# Patient Record
Sex: MALE | Race: WHITE | Employment: OTHER | URBAN - METROPOLITAN AREA
[De-identification: names, ages, dates, MRNs, and addresses within clinical notes are randomized per-mention and may not be internally consistent; named-entity substitution may affect disease eponyms.]

---

## 2017-02-10 RX ORDER — GLYBURIDE 5 MG/1
TABLET ORAL
Qty: 360 TABLET | Refills: 0 | Status: SHIPPED | OUTPATIENT
Start: 2017-02-10 | End: 2017-06-21

## 2017-02-12 RX ORDER — OMEGA-3-ACID ETHYL ESTERS 1 G/1
CAPSULE, LIQUID FILLED ORAL
Qty: 360 CAPSULE | Refills: 0 | Status: SHIPPED | OUTPATIENT
Start: 2017-02-12 | End: 2017-07-21

## 2017-02-12 RX ORDER — TAMSULOSIN HYDROCHLORIDE 0.4 MG/1
CAPSULE ORAL
Qty: 90 CAPSULE | Refills: 0 | Status: SHIPPED | OUTPATIENT
Start: 2017-02-12 | End: 2017-05-30

## 2017-02-12 RX ORDER — SITAGLIPTIN AND METFORMIN HYDROCHLORIDE 50; 1000 MG/1; MG/1
TABLET, FILM COATED, EXTENDED RELEASE ORAL
Qty: 180 TABLET | Refills: 0 | Status: SHIPPED | OUTPATIENT
Start: 2017-02-12 | End: 2017-05-30

## 2017-02-12 RX ORDER — VALSARTAN AND HYDROCHLOROTHIAZIDE 160; 25 MG/1; MG/1
TABLET ORAL
Qty: 90 TABLET | Refills: 0 | Status: SHIPPED | OUTPATIENT
Start: 2017-02-12 | End: 2017-05-30

## 2017-03-06 RX ORDER — INSULIN DETEMIR 100 [IU]/ML
INJECTION, SOLUTION SUBCUTANEOUS
Qty: 18 PEN | Refills: 1 | Status: SHIPPED | OUTPATIENT
Start: 2017-03-06 | End: 2017-07-21

## 2017-04-13 ENCOUNTER — APPOINTMENT (OUTPATIENT)
Dept: LAB | Age: 62
End: 2017-04-13
Attending: FAMILY MEDICINE
Payer: COMMERCIAL

## 2017-04-13 DIAGNOSIS — E11.40 TYPE 2 DIABETES MELLITUS WITH DIABETIC NEUROPATHY, WITH LONG-TERM CURRENT USE OF INSULIN (HCC): ICD-10-CM

## 2017-04-13 DIAGNOSIS — E78.5 DYSLIPIDEMIA: ICD-10-CM

## 2017-04-13 DIAGNOSIS — E55.9 VITAMIN D DEFICIENCY: ICD-10-CM

## 2017-04-13 DIAGNOSIS — Z87.39 HISTORY OF GOUT: ICD-10-CM

## 2017-04-13 DIAGNOSIS — Z79.4 TYPE 2 DIABETES MELLITUS WITH DIABETIC NEUROPATHY, WITH LONG-TERM CURRENT USE OF INSULIN (HCC): ICD-10-CM

## 2017-04-13 PROCEDURE — 36415 COLL VENOUS BLD VENIPUNCTURE: CPT | Performed by: FAMILY MEDICINE

## 2017-04-13 PROCEDURE — 82570 ASSAY OF URINE CREATININE: CPT | Performed by: FAMILY MEDICINE

## 2017-04-13 PROCEDURE — 84550 ASSAY OF BLOOD/URIC ACID: CPT | Performed by: FAMILY MEDICINE

## 2017-04-13 PROCEDURE — 80061 LIPID PANEL: CPT | Performed by: FAMILY MEDICINE

## 2017-04-13 PROCEDURE — 83036 HEMOGLOBIN GLYCOSYLATED A1C: CPT | Performed by: FAMILY MEDICINE

## 2017-04-13 PROCEDURE — 80053 COMPREHEN METABOLIC PANEL: CPT | Performed by: FAMILY MEDICINE

## 2017-04-13 PROCEDURE — 82306 VITAMIN D 25 HYDROXY: CPT | Performed by: FAMILY MEDICINE

## 2017-04-13 PROCEDURE — 82043 UR ALBUMIN QUANTITATIVE: CPT | Performed by: FAMILY MEDICINE

## 2017-04-18 ENCOUNTER — OFFICE VISIT (OUTPATIENT)
Dept: FAMILY MEDICINE CLINIC | Facility: CLINIC | Age: 62
End: 2017-04-18

## 2017-04-18 VITALS
BODY MASS INDEX: 37.03 KG/M2 | TEMPERATURE: 97 F | DIASTOLIC BLOOD PRESSURE: 78 MMHG | SYSTOLIC BLOOD PRESSURE: 138 MMHG | HEART RATE: 80 BPM | RESPIRATION RATE: 16 BRPM | WEIGHT: 250 LBS | HEIGHT: 69 IN

## 2017-04-18 DIAGNOSIS — Z79.4 TYPE 2 DIABETES MELLITUS WITH DIABETIC NEUROPATHY, WITH LONG-TERM CURRENT USE OF INSULIN (HCC): Primary | ICD-10-CM

## 2017-04-18 DIAGNOSIS — E55.9 VITAMIN D DEFICIENCY: ICD-10-CM

## 2017-04-18 DIAGNOSIS — E11.40 TYPE 2 DIABETES MELLITUS WITH DIABETIC NEUROPATHY, WITH LONG-TERM CURRENT USE OF INSULIN (HCC): Primary | ICD-10-CM

## 2017-04-18 DIAGNOSIS — F17.200 SMOKING: ICD-10-CM

## 2017-04-18 DIAGNOSIS — Z87.39 HISTORY OF GOUT: ICD-10-CM

## 2017-04-18 DIAGNOSIS — E78.5 DYSLIPIDEMIA: ICD-10-CM

## 2017-04-18 DIAGNOSIS — J44.9 CHRONIC OBSTRUCTIVE PULMONARY DISEASE, UNSPECIFIED COPD TYPE (HCC): ICD-10-CM

## 2017-04-18 DIAGNOSIS — E66.09 NON MORBID OBESITY DUE TO EXCESS CALORIES: ICD-10-CM

## 2017-04-18 DIAGNOSIS — I10 ESSENTIAL HYPERTENSION: ICD-10-CM

## 2017-04-18 PROCEDURE — 99214 OFFICE O/P EST MOD 30 MIN: CPT | Performed by: FAMILY MEDICINE

## 2017-04-18 PROCEDURE — 99406 BEHAV CHNG SMOKING 3-10 MIN: CPT | Performed by: FAMILY MEDICINE

## 2017-04-18 NOTE — PROGRESS NOTES
HPI:   Hernan York is a 59year old male who presents for recheck of his diabetes, hypertension and dyslipidemia. Patient has not been checking his FBS's at home regularly.  He reports that the readings that he does recall have been around 120's-130'  U/L   AST 25 15-41 U/L   Alt 41 17-63 U/L   Bilirubin, Total 0.5 0.1-2.0 mg/dL   Total Protein 7.8 6.1-8.3 g/dL   Albumin 4.1 3.5-4.8 g/dL   Sodium 137 136-144 mmol/L   Potassium 4.3 3.6-5.1 mmol/L   Chloride 101 101-111 mmol/L   CO2 29.0 22.0-32.0 1 BY MOUTH NIGHTLY Disp: 90 tablet Rfl: 1   SPIRIVA HANDIHALER 18 MCG Inhalation Cap USE 2 INHALATIONS FROM ONE CAPSULE DAILY VIA HANDIHALER Disp: 90 capsule Rfl: 1   Pantoprazole Sodium 40 MG Oral Tab EC Take 1 tablet (40 mg total) by mouth every morning ESOPHAGOGASTRODUODENOSCOPY (EGD);   Surgeon: Bran Mensah MD;  Location: [facility] ENDOSCOPY      Social History:   Smoking Status: Current Every Day Smoker        Packs/Day: 1.00  Years: 30        Types: Cigarettes    Smokeless Status: Never Used who presents for a recheck of his diabetes, HTN, and dyslipidemia. Diabetic control is moderate  Recommendations are: continue present meds, check HgbA1C, check fasting lipids, CMP, and urine microalbumin in 3 months.    Advised to continue to focus on he

## 2017-05-30 RX ORDER — TAMSULOSIN HYDROCHLORIDE 0.4 MG/1
CAPSULE ORAL
Qty: 90 CAPSULE | Refills: 0 | Status: SHIPPED | OUTPATIENT
Start: 2017-05-30 | End: 2017-07-21

## 2017-05-30 RX ORDER — VALSARTAN AND HYDROCHLOROTHIAZIDE 160; 25 MG/1; MG/1
TABLET ORAL
Qty: 90 TABLET | Refills: 0 | Status: SHIPPED | OUTPATIENT
Start: 2017-05-30 | End: 2017-07-21

## 2017-05-30 RX ORDER — SITAGLIPTIN AND METFORMIN HYDROCHLORIDE 50; 1000 MG/1; MG/1
TABLET, FILM COATED, EXTENDED RELEASE ORAL
Qty: 180 TABLET | Refills: 0 | Status: SHIPPED | OUTPATIENT
Start: 2017-05-30 | End: 2017-07-21

## 2017-06-21 ENCOUNTER — TELEPHONE (OUTPATIENT)
Dept: FAMILY MEDICINE CLINIC | Facility: CLINIC | Age: 62
End: 2017-06-21

## 2017-06-21 RX ORDER — GLYBURIDE 5 MG/1
TABLET ORAL
Qty: 120 TABLET | Refills: 0 | Status: SHIPPED | OUTPATIENT
Start: 2017-06-21 | End: 2017-07-21

## 2017-06-21 NOTE — TELEPHONE ENCOUNTER
Pt wants only this time to call his prescription glyburide (30 days rest goes to mail order) to [facility] on [address].  Leaving out of town Friday need asap

## 2017-07-22 RX ORDER — OMEGA-3-ACID ETHYL ESTERS 1 G/1
CAPSULE, LIQUID FILLED ORAL
Qty: 360 CAPSULE | Refills: 0 | Status: SHIPPED
Start: 2017-07-22 | End: 2017-10-09

## 2017-07-22 NOTE — TELEPHONE ENCOUNTER
From: Sandra Stewart  Sent: 7/21/2017 9:35 AM CDT  Subject: Medication Renewal Request    Sandra Stewart would like a refill of the following medications:  OMEGA-3-ACID ETHYL ESTERS 1 g Oral Cap Javier Harmon MD]    Preferred pharmacy: Other

## 2017-07-24 RX ORDER — GLYBURIDE 5 MG/1
TABLET ORAL
Qty: 120 TABLET | Refills: 0 | Status: SHIPPED
Start: 2017-07-24 | End: 2017-07-26

## 2017-07-24 RX ORDER — VALSARTAN AND HYDROCHLOROTHIAZIDE 160; 25 MG/1; MG/1
TABLET ORAL
Qty: 90 TABLET | Refills: 0 | Status: SHIPPED
Start: 2017-07-24 | End: 2017-09-19

## 2017-07-24 RX ORDER — EZETIMIBE AND SIMVASTATIN 10; 40 MG/1; MG/1
TABLET ORAL
Qty: 90 TABLET | Refills: 0 | Status: SHIPPED
Start: 2017-07-24 | End: 2017-09-19

## 2017-07-24 RX ORDER — TAMSULOSIN HYDROCHLORIDE 0.4 MG/1
0.4 CAPSULE ORAL DAILY
Qty: 90 CAPSULE | Refills: 0 | Status: SHIPPED
Start: 2017-07-24 | End: 2017-09-17

## 2017-07-24 NOTE — TELEPHONE ENCOUNTER
From: Florida Abraham  Sent: 7/21/2017 9:35 AM CDT  Subject: Medication Renewal Request    Florida Abraham would like a refill of the following medications:  Insulin Pen Needle (BD PEN NEEDLE SHORT U/F) 31G X 8 MM Does not apply Misc [Jo Ann Wilson,

## 2017-07-26 RX ORDER — GLYBURIDE 5 MG/1
TABLET ORAL
Qty: 120 TABLET | Refills: 0 | Status: SHIPPED | OUTPATIENT
Start: 2017-07-26 | End: 2017-08-07

## 2017-08-07 ENCOUNTER — TELEPHONE (OUTPATIENT)
Dept: FAMILY MEDICINE CLINIC | Facility: CLINIC | Age: 62
End: 2017-08-07

## 2017-08-07 RX ORDER — GLYBURIDE 5 MG/1
TABLET ORAL
Qty: 360 TABLET | Refills: 0 | COMMUNITY
Start: 2017-08-07 | End: 2018-02-14

## 2017-08-07 NOTE — TELEPHONE ENCOUNTER
Received call from [facility], spoke with Rhonda.   [facility] [facility] to know if the could increase the amount to 90 days as the insurance will not fill for 30 days at a time through [employer]

## 2017-08-10 ENCOUNTER — APPOINTMENT (OUTPATIENT)
Dept: LAB | Age: 62
End: 2017-08-10
Attending: FAMILY MEDICINE
Payer: COMMERCIAL

## 2017-08-10 DIAGNOSIS — Z87.39 HISTORY OF GOUT: ICD-10-CM

## 2017-08-10 DIAGNOSIS — Z79.4 TYPE 2 DIABETES MELLITUS WITH DIABETIC NEUROPATHY, WITH LONG-TERM CURRENT USE OF INSULIN (HCC): ICD-10-CM

## 2017-08-10 DIAGNOSIS — E11.40 TYPE 2 DIABETES MELLITUS WITH DIABETIC NEUROPATHY, WITH LONG-TERM CURRENT USE OF INSULIN (HCC): ICD-10-CM

## 2017-08-10 LAB
ALBUMIN SERPL-MCNC: 3.9 G/DL (ref 3.5–4.8)
ALP LIVER SERPL-CCNC: 82 U/L (ref 45–117)
ALT SERPL-CCNC: 36 U/L (ref 17–63)
AST SERPL-CCNC: 20 U/L (ref 15–41)
BILIRUB SERPL-MCNC: 0.7 MG/DL (ref 0.1–2)
BUN BLD-MCNC: 27 MG/DL (ref 8–20)
CALCIUM BLD-MCNC: 9.4 MG/DL (ref 8.3–10.3)
CHLORIDE: 102 MMOL/L (ref 101–111)
CO2: 28 MMOL/L (ref 22–32)
CREAT BLD-MCNC: 1.03 MG/DL (ref 0.7–1.3)
CREAT UR-SCNC: 82.9 MG/DL
EST. AVERAGE GLUCOSE BLD GHB EST-MCNC: 128 MG/DL (ref 68–126)
GLUCOSE BLD-MCNC: 180 MG/DL (ref 70–99)
HBA1C MFR BLD HPLC: 6.1 % (ref ?–5.7)
M PROTEIN MFR SERPL ELPH: 7.5 G/DL (ref 6.1–8.3)
MICROALBUMIN UR-MCNC: 3.09 MG/DL
MICROALBUMIN/CREAT 24H UR-RTO: 37.3 UG/MG (ref ?–30)
POTASSIUM SERPL-SCNC: 4.3 MMOL/L (ref 3.6–5.1)
SODIUM SERPL-SCNC: 138 MMOL/L (ref 136–144)
URIC ACID: 9.4 MG/DL (ref 2.4–8.7)

## 2017-08-10 PROCEDURE — 84550 ASSAY OF BLOOD/URIC ACID: CPT | Performed by: FAMILY MEDICINE

## 2017-08-10 PROCEDURE — 80053 COMPREHEN METABOLIC PANEL: CPT | Performed by: FAMILY MEDICINE

## 2017-08-10 PROCEDURE — 36415 COLL VENOUS BLD VENIPUNCTURE: CPT | Performed by: FAMILY MEDICINE

## 2017-08-10 PROCEDURE — 82043 UR ALBUMIN QUANTITATIVE: CPT | Performed by: FAMILY MEDICINE

## 2017-08-10 PROCEDURE — 82570 ASSAY OF URINE CREATININE: CPT | Performed by: FAMILY MEDICINE

## 2017-08-10 PROCEDURE — 83036 HEMOGLOBIN GLYCOSYLATED A1C: CPT | Performed by: FAMILY MEDICINE

## 2017-08-14 ENCOUNTER — OFFICE VISIT (OUTPATIENT)
Dept: FAMILY MEDICINE CLINIC | Facility: CLINIC | Age: 62
End: 2017-08-14

## 2017-08-14 VITALS
DIASTOLIC BLOOD PRESSURE: 70 MMHG | BODY MASS INDEX: 38.06 KG/M2 | HEART RATE: 88 BPM | RESPIRATION RATE: 16 BRPM | WEIGHT: 257 LBS | SYSTOLIC BLOOD PRESSURE: 120 MMHG | HEIGHT: 69 IN

## 2017-08-14 DIAGNOSIS — E11.40 TYPE 2 DIABETES MELLITUS WITH DIABETIC NEUROPATHY, WITH LONG-TERM CURRENT USE OF INSULIN (HCC): Primary | ICD-10-CM

## 2017-08-14 DIAGNOSIS — R01.1 MURMUR, CARDIAC: ICD-10-CM

## 2017-08-14 DIAGNOSIS — Z12.5 SCREENING FOR PROSTATE CANCER: ICD-10-CM

## 2017-08-14 DIAGNOSIS — Z13.0 SCREENING FOR DEFICIENCY ANEMIA: ICD-10-CM

## 2017-08-14 DIAGNOSIS — IMO0001 CLASS 2 OBESITY DUE TO EXCESS CALORIES WITH SERIOUS COMORBIDITY AND BODY MASS INDEX (BMI) OF 37.0 TO 37.9 IN ADULT: ICD-10-CM

## 2017-08-14 DIAGNOSIS — J44.9 CHRONIC OBSTRUCTIVE PULMONARY DISEASE, UNSPECIFIED COPD TYPE (HCC): ICD-10-CM

## 2017-08-14 DIAGNOSIS — I10 ESSENTIAL HYPERTENSION: ICD-10-CM

## 2017-08-14 DIAGNOSIS — E78.5 DYSLIPIDEMIA: ICD-10-CM

## 2017-08-14 DIAGNOSIS — Z79.4 TYPE 2 DIABETES MELLITUS WITH DIABETIC NEUROPATHY, WITH LONG-TERM CURRENT USE OF INSULIN (HCC): Primary | ICD-10-CM

## 2017-08-14 DIAGNOSIS — Z87.39 HISTORY OF GOUT: ICD-10-CM

## 2017-08-14 DIAGNOSIS — E55.9 VITAMIN D DEFICIENCY: ICD-10-CM

## 2017-08-14 DIAGNOSIS — E66.09 NON MORBID OBESITY DUE TO EXCESS CALORIES: ICD-10-CM

## 2017-08-14 DIAGNOSIS — I35.0 NONRHEUMATIC AORTIC VALVE STENOSIS: ICD-10-CM

## 2017-08-14 DIAGNOSIS — F17.200 SMOKING: ICD-10-CM

## 2017-08-14 PROCEDURE — 99214 OFFICE O/P EST MOD 30 MIN: CPT | Performed by: FAMILY MEDICINE

## 2017-09-18 RX ORDER — TAMSULOSIN HYDROCHLORIDE 0.4 MG/1
CAPSULE ORAL
Qty: 90 CAPSULE | Refills: 0 | Status: SHIPPED | OUTPATIENT
Start: 2017-09-18 | End: 2018-02-25

## 2017-09-18 NOTE — TELEPHONE ENCOUNTER
Not protocol medication. LOV 8/14/17 DM f/u   Next appointment 11/13/17  Please see pending medication. Refill if appropriate.    Last refill:  7/24/17 tamsulosin

## 2017-09-19 RX ORDER — EZETIMIBE AND SIMVASTATIN 10; 40 MG/1; MG/1
TABLET ORAL
Qty: 90 TABLET | Refills: 1 | Status: SHIPPED | OUTPATIENT
Start: 2017-09-19 | End: 2018-03-25

## 2017-09-19 RX ORDER — VALSARTAN AND HYDROCHLOROTHIAZIDE 160; 25 MG/1; MG/1
TABLET ORAL
Qty: 90 TABLET | Refills: 1 | Status: SHIPPED | OUTPATIENT
Start: 2017-09-19 | End: 2018-02-14

## 2017-10-12 NOTE — TELEPHONE ENCOUNTER
From: Melody Ventura  Sent: 10/9/2017 2:33 PM CDT  Subject: Medication Renewal Request    Melody Ventura would like a refill of the following medications:     Omega-3-acid Ethyl Esters 1 g Oral Cap Thomas Swan DO]   Patient Comment: Ray Plascencia

## 2017-10-18 RX ORDER — OMEGA-3-ACID ETHYL ESTERS 1 G/1
CAPSULE, LIQUID FILLED ORAL
Qty: 360 CAPSULE | Refills: 0 | Status: SHIPPED
Start: 2017-10-18 | End: 2018-02-14

## 2017-10-31 ENCOUNTER — HOSPITAL ENCOUNTER (OUTPATIENT)
Dept: CV DIAGNOSTICS | Facility: HOSPITAL | Age: 62
Discharge: HOME | End: 2017-10-31
Attending: FAMILY MEDICINE
Payer: COMMERCIAL

## 2017-10-31 DIAGNOSIS — R01.1 MURMUR, CARDIAC: ICD-10-CM

## 2017-10-31 DIAGNOSIS — I35.0 NONRHEUMATIC AORTIC VALVE STENOSIS: ICD-10-CM

## 2017-10-31 PROCEDURE — 93306 TTE W/DOPPLER COMPLETE: CPT | Performed by: FAMILY MEDICINE

## 2017-11-02 PROBLEM — I35.9 AORTIC VALVE CALCIFICATION: Status: ACTIVE | Noted: 2017-11-02

## 2017-11-02 PROBLEM — I05.9 MITRAL VALVE ANNULAR CALCIFICATION: Status: ACTIVE | Noted: 2017-11-02

## 2017-11-02 PROBLEM — I34.81 MITRAL VALVE ANNULAR CALCIFICATION: Status: ACTIVE | Noted: 2017-11-02

## 2017-11-09 ENCOUNTER — PRIOR ORIGINAL RECORDS (OUTPATIENT)
Dept: OTHER | Age: 62
End: 2017-11-09

## 2017-11-09 ENCOUNTER — APPOINTMENT (OUTPATIENT)
Dept: LAB | Age: 62
End: 2017-11-09
Attending: FAMILY MEDICINE
Payer: COMMERCIAL

## 2017-11-09 PROCEDURE — 80050 GENERAL HEALTH PANEL: CPT | Performed by: FAMILY MEDICINE

## 2017-11-09 PROCEDURE — 83036 HEMOGLOBIN GLYCOSYLATED A1C: CPT | Performed by: FAMILY MEDICINE

## 2017-11-09 PROCEDURE — 82570 ASSAY OF URINE CREATININE: CPT | Performed by: FAMILY MEDICINE

## 2017-11-09 PROCEDURE — 84153 ASSAY OF PSA TOTAL: CPT | Performed by: FAMILY MEDICINE

## 2017-11-09 PROCEDURE — 84550 ASSAY OF BLOOD/URIC ACID: CPT | Performed by: FAMILY MEDICINE

## 2017-11-09 PROCEDURE — 36415 COLL VENOUS BLD VENIPUNCTURE: CPT | Performed by: FAMILY MEDICINE

## 2017-11-09 PROCEDURE — 82306 VITAMIN D 25 HYDROXY: CPT | Performed by: FAMILY MEDICINE

## 2017-11-09 PROCEDURE — 82043 UR ALBUMIN QUANTITATIVE: CPT | Performed by: FAMILY MEDICINE

## 2017-11-09 PROCEDURE — 80061 LIPID PANEL: CPT | Performed by: FAMILY MEDICINE

## 2017-11-13 ENCOUNTER — OFFICE VISIT (OUTPATIENT)
Dept: FAMILY MEDICINE CLINIC | Facility: CLINIC | Age: 62
End: 2017-11-13

## 2017-11-13 VITALS
RESPIRATION RATE: 14 BRPM | HEIGHT: 69 IN | WEIGHT: 260 LBS | HEART RATE: 84 BPM | BODY MASS INDEX: 38.51 KG/M2 | DIASTOLIC BLOOD PRESSURE: 70 MMHG | SYSTOLIC BLOOD PRESSURE: 102 MMHG

## 2017-11-13 DIAGNOSIS — I35.0 MILD AORTIC STENOSIS: ICD-10-CM

## 2017-11-13 DIAGNOSIS — I10 ESSENTIAL HYPERTENSION: ICD-10-CM

## 2017-11-13 DIAGNOSIS — E78.5 DYSLIPIDEMIA: ICD-10-CM

## 2017-11-13 DIAGNOSIS — Z79.4 TYPE 2 DIABETES MELLITUS WITH DIABETIC NEUROPATHY, WITH LONG-TERM CURRENT USE OF INSULIN (HCC): Primary | ICD-10-CM

## 2017-11-13 DIAGNOSIS — N40.1 BENIGN PROSTATIC HYPERPLASIA WITH LOWER URINARY TRACT SYMPTOMS, SYMPTOM DETAILS UNSPECIFIED: ICD-10-CM

## 2017-11-13 DIAGNOSIS — J44.9 CHRONIC OBSTRUCTIVE PULMONARY DISEASE, UNSPECIFIED COPD TYPE (HCC): ICD-10-CM

## 2017-11-13 DIAGNOSIS — E11.40 TYPE 2 DIABETES MELLITUS WITH DIABETIC NEUROPATHY, WITH LONG-TERM CURRENT USE OF INSULIN (HCC): Primary | ICD-10-CM

## 2017-11-13 DIAGNOSIS — Z71.85 VACCINE COUNSELING: ICD-10-CM

## 2017-11-13 DIAGNOSIS — E55.9 VITAMIN D DEFICIENCY: ICD-10-CM

## 2017-11-13 DIAGNOSIS — E66.1 DRUG-INDUCED OBESITY WITH SERIOUS COMORBIDITY, UNSPECIFIED CLASSIFICATION: ICD-10-CM

## 2017-11-13 DIAGNOSIS — I05.9 MITRAL VALVE ANNULAR CALCIFICATION: ICD-10-CM

## 2017-11-13 DIAGNOSIS — I35.9 AORTIC VALVE CALCIFICATION: ICD-10-CM

## 2017-11-13 DIAGNOSIS — F17.200 SMOKING: ICD-10-CM

## 2017-11-13 DIAGNOSIS — Z23 NEED FOR VACCINATION: ICD-10-CM

## 2017-11-13 PROCEDURE — 99214 OFFICE O/P EST MOD 30 MIN: CPT | Performed by: FAMILY MEDICINE

## 2017-11-13 PROCEDURE — 90686 IIV4 VACC NO PRSV 0.5 ML IM: CPT | Performed by: FAMILY MEDICINE

## 2017-11-13 PROCEDURE — 90471 IMMUNIZATION ADMIN: CPT | Performed by: FAMILY MEDICINE

## 2017-11-13 NOTE — PROGRESS NOTES
HPI:   Иван Arciniega is a 57year old male who presents for recheck of his diabetes, hypertension and dyslipidemia. Patient has not been checking his FBS's at home regularly. Last eye exam -- 3 months ago.   Patient presents for recheck of his hypert Chol 97 <130 mg/dL   -PSA TOTAL W REFLEX TO FREE   Result Value Ref Range   PSA 2.540 0.010 - 4.000 ng/mL   -HEMOGLOBIN A1C   Result Value Ref Range   HgbA1C 6.8 (H) <5.7 %   Estimated Average Glucose 148 (H) 68 - 126 mg/dL   -MICROALB/CREAT RATIO, RANDOM Rfl: 0   Insulin Pen Needle (BD PEN NEEDLE SHORT U/F) 31G X 8 MM Does not apply Misc Please disregard previous qty on this. Pt uses up to 4x a day.  Pt uses 360 a month~ Disp: 400 each Rfl: 0   Tiotropium Bromide Monohydrate (Juan Jose Rich) [facility] COLONOSCOPY  2013: COLONOSCOPY      Comment: Dr. Bud Hassan; due in 5 years  03/01/2011: DENTAL SURGERY PROCEDURE  07/07/2008: OTHER SURGICAL HISTORY      Comment: Esophagogastroduodenscopy  06/2010: OTHER SURGICAL HISTORY      Comment: Surgery for ablation of and intact bilaterally  Bilateral barefoot skin diabetic exam is normal, visualized feet and the appearance is normal.  Bilateral monofilament/sensation of both feet is normal.  Pulsation pedal pulse exam of both lower legs/feet is normal as well.   NEURO: requested or ordered in this encounter    Imaging & Consults:  None  Colonoscopy due in 2018. Focus on weight loss. See Dr. Neha Cifuentes for follow up. Has appt in 2 days. Reviewed echo with pt today. Pt. Has not desire to quit smoking yet.   Advised to lose

## 2017-11-15 ENCOUNTER — PRIOR ORIGINAL RECORDS (OUTPATIENT)
Dept: OTHER | Age: 62
End: 2017-11-15

## 2017-11-24 RX ORDER — GLYBURIDE 5 MG/1
TABLET ORAL
Qty: 360 TABLET | Refills: 0 | Status: SHIPPED | OUTPATIENT
Start: 2017-11-24 | End: 2018-02-14

## 2017-11-24 RX ORDER — VALSARTAN AND HYDROCHLOROTHIAZIDE 160; 25 MG/1; MG/1
TABLET ORAL
Qty: 90 TABLET | Refills: 0 | Status: SHIPPED | OUTPATIENT
Start: 2017-11-24 | End: 2018-02-14

## 2017-11-24 RX ORDER — SITAGLIPTIN AND METFORMIN HYDROCHLORIDE 50; 1000 MG/1; MG/1
TABLET, FILM COATED, EXTENDED RELEASE ORAL
Qty: 180 TABLET | Refills: 0 | Status: SHIPPED | OUTPATIENT
Start: 2017-11-24 | End: 2018-03-25

## 2017-11-27 RX ORDER — INSULIN DETEMIR 100 [IU]/ML
INJECTION, SOLUTION SUBCUTANEOUS
Qty: 20 PEN | Refills: 1 | Status: SHIPPED | OUTPATIENT
Start: 2017-11-27 | End: 2018-05-16

## 2017-11-27 NOTE — TELEPHONE ENCOUNTER
Not protocol medication. LOV :11/13/17 med check   Last labs done :11/09/17  Next appointment :2/14/17  Please see pending medication. Refill if appropriate.    Last refill:    Date:7/24/17  Amount :18 pen no refill   Medication: Levemir flex touch   Medi

## 2017-11-28 ENCOUNTER — HOSPITAL ENCOUNTER (OUTPATIENT)
Dept: CT IMAGING | Facility: HOSPITAL | Age: 62
Discharge: HOME | End: 2017-11-28
Attending: INTERNAL MEDICINE
Payer: COMMERCIAL

## 2017-11-28 DIAGNOSIS — R94.39 ABNORMAL STRESS TEST: ICD-10-CM

## 2017-11-28 DIAGNOSIS — I25.10 CAD (CORONARY ARTERY DISEASE): ICD-10-CM

## 2017-11-28 DIAGNOSIS — R07.9 CHEST PAIN: ICD-10-CM

## 2017-11-28 PROCEDURE — 75574 CT ANGIO HRT W/3D IMAGE: CPT | Performed by: INTERNAL MEDICINE

## 2017-11-28 RX ORDER — METOPROLOL TARTRATE 5 MG/5ML
INJECTION INTRAVENOUS
Status: DISCONTINUED
Start: 2017-11-28 | End: 2017-11-28 | Stop reason: WASHOUT

## 2017-11-28 RX ORDER — NITROGLYCERIN 0.4 MG/1
0.4 TABLET SUBLINGUAL ONCE
Status: COMPLETED | OUTPATIENT
Start: 2017-11-28 | End: 2017-11-28

## 2017-11-28 RX ORDER — NITROGLYCERIN 0.4 MG/1
TABLET SUBLINGUAL
Status: COMPLETED
Start: 2017-11-28 | End: 2017-11-28

## 2017-11-28 RX ADMIN — NITROGLYCERIN 0.4 MG: 0.4 TABLET SUBLINGUAL at 10:48:00

## 2017-12-01 ENCOUNTER — PRIOR ORIGINAL RECORDS (OUTPATIENT)
Dept: OTHER | Age: 62
End: 2017-12-01

## 2017-12-05 LAB
ALBUMIN: 3.8 G/DL
ALKALINE PHOSPHATATE(ALK PHOS): 86 IU/L
BILIRUBIN TOTAL: 0.5 MG/DL
BUN: 21 MG/DL
CALCIUM: 8.8 MG/DL
CHLORIDE: 104 MEQ/L
CHOLESTEROL, TOTAL: 142 MG/DL
CREATININE, SERUM: 1.1 MG/DL
GLUCOSE: 167 MG/DL
HDL CHOLESTEROL: 45 MG/DL
HEMATOCRIT: 49.9 %
HEMOGLOBIN: 16.4 G/DL
LDL CHOLESTEROL: 61 MG/DL
PLATELETS: 181 K/UL
POTASSIUM, SERUM: 4.6 MEQ/L
PROTEIN, TOTAL: 7.4 G/DL
RED BLOOD COUNT: 5.56 X 10-6/U
SGOT (AST): 16 IU/L
SGPT (ALT): 41 IU/L
SODIUM: 139 MEQ/L
THYROID STIMULATING HORMONE: 1.38 MLU/L
TRIGLYCERIDES: 178 MG/DL
VITAMIN D 25-OH: 44.5 NG/ML
WHITE BLOOD COUNT: 8.7 X 10-3/U

## 2018-01-31 NOTE — TELEPHONE ENCOUNTER
From: Deangelo Egan  Sent: 1/26/2018 10:04 AM CST  Subject: Medication Renewal Request    Deangelo Egan would like a refill of the following medications:     Insulin Pen Needle (BD PEN NEEDLE SHORT U/F) 31G X 8 MM Does not apply Misc [Jo Ann Dalton

## 2018-02-08 ENCOUNTER — APPOINTMENT (OUTPATIENT)
Dept: LAB | Age: 63
End: 2018-02-08
Attending: FAMILY MEDICINE
Payer: COMMERCIAL

## 2018-02-08 ENCOUNTER — PRIOR ORIGINAL RECORDS (OUTPATIENT)
Dept: OTHER | Age: 63
End: 2018-02-08

## 2018-02-08 DIAGNOSIS — E55.9 VITAMIN D DEFICIENCY: ICD-10-CM

## 2018-02-08 DIAGNOSIS — E78.5 DYSLIPIDEMIA: ICD-10-CM

## 2018-02-08 DIAGNOSIS — E11.40 TYPE 2 DIABETES MELLITUS WITH DIABETIC NEUROPATHY, WITH LONG-TERM CURRENT USE OF INSULIN (HCC): ICD-10-CM

## 2018-02-08 DIAGNOSIS — Z79.4 TYPE 2 DIABETES MELLITUS WITH DIABETIC NEUROPATHY, WITH LONG-TERM CURRENT USE OF INSULIN (HCC): ICD-10-CM

## 2018-02-08 LAB
25-HYDROXYVITAMIN D (TOTAL): 66.2 NG/ML (ref 30–100)
ALBUMIN SERPL-MCNC: 4.1 G/DL (ref 3.5–4.8)
ALP LIVER SERPL-CCNC: 79 U/L (ref 45–117)
ALT SERPL-CCNC: 32 U/L (ref 17–63)
AST SERPL-CCNC: 15 U/L (ref 15–41)
BILIRUB SERPL-MCNC: 0.5 MG/DL (ref 0.1–2)
BUN BLD-MCNC: 16 MG/DL (ref 8–20)
CALCIUM BLD-MCNC: 9.6 MG/DL (ref 8.3–10.3)
CHLORIDE: 102 MMOL/L (ref 101–111)
CHOLEST SMN-MCNC: 120 MG/DL (ref ?–200)
CO2: 28 MMOL/L (ref 22–32)
CREAT BLD-MCNC: 1.01 MG/DL (ref 0.7–1.3)
EST. AVERAGE GLUCOSE BLD GHB EST-MCNC: 160 MG/DL (ref 68–126)
GLUCOSE BLD-MCNC: 213 MG/DL (ref 70–99)
HBA1C MFR BLD HPLC: 7.2 % (ref ?–5.7)
HDLC SERPL-MCNC: 42 MG/DL (ref 45–?)
HDLC SERPL: 2.86 {RATIO} (ref ?–4.97)
LDLC SERPL CALC-MCNC: 52 MG/DL (ref ?–130)
M PROTEIN MFR SERPL ELPH: 7.6 G/DL (ref 6.1–8.3)
NONHDLC SERPL-MCNC: 78 MG/DL (ref ?–130)
POTASSIUM SERPL-SCNC: 4.3 MMOL/L (ref 3.6–5.1)
SODIUM SERPL-SCNC: 138 MMOL/L (ref 136–144)
TRIGL SERPL-MCNC: 132 MG/DL (ref ?–150)
VLDLC SERPL CALC-MCNC: 26 MG/DL (ref 5–40)

## 2018-02-08 PROCEDURE — 83036 HEMOGLOBIN GLYCOSYLATED A1C: CPT | Performed by: FAMILY MEDICINE

## 2018-02-08 PROCEDURE — 36415 COLL VENOUS BLD VENIPUNCTURE: CPT | Performed by: FAMILY MEDICINE

## 2018-02-08 PROCEDURE — 80053 COMPREHEN METABOLIC PANEL: CPT | Performed by: FAMILY MEDICINE

## 2018-02-08 PROCEDURE — 82306 VITAMIN D 25 HYDROXY: CPT | Performed by: FAMILY MEDICINE

## 2018-02-08 PROCEDURE — 80061 LIPID PANEL: CPT | Performed by: FAMILY MEDICINE

## 2018-02-14 ENCOUNTER — OFFICE VISIT (OUTPATIENT)
Dept: FAMILY MEDICINE CLINIC | Facility: CLINIC | Age: 63
End: 2018-02-14

## 2018-02-14 VITALS
RESPIRATION RATE: 12 BRPM | WEIGHT: 258 LBS | HEIGHT: 70 IN | BODY MASS INDEX: 36.94 KG/M2 | DIASTOLIC BLOOD PRESSURE: 70 MMHG | HEART RATE: 84 BPM | SYSTOLIC BLOOD PRESSURE: 124 MMHG

## 2018-02-14 DIAGNOSIS — Z79.4 TYPE 2 DIABETES MELLITUS WITH DIABETIC NEUROPATHY, WITH LONG-TERM CURRENT USE OF INSULIN (HCC): Primary | ICD-10-CM

## 2018-02-14 DIAGNOSIS — E11.40 TYPE 2 DIABETES MELLITUS WITH DIABETIC NEUROPATHY, WITH LONG-TERM CURRENT USE OF INSULIN (HCC): Primary | ICD-10-CM

## 2018-02-14 DIAGNOSIS — I10 ESSENTIAL HYPERTENSION: ICD-10-CM

## 2018-02-14 DIAGNOSIS — E78.5 DYSLIPIDEMIA: ICD-10-CM

## 2018-02-14 DIAGNOSIS — E55.9 VITAMIN D DEFICIENCY: ICD-10-CM

## 2018-02-14 DIAGNOSIS — F17.200 SMOKING: ICD-10-CM

## 2018-02-14 DIAGNOSIS — Z12.11 SCREENING FOR COLON CANCER: ICD-10-CM

## 2018-02-14 DIAGNOSIS — E66.01 CLASS 2 SEVERE OBESITY DUE TO EXCESS CALORIES WITH SERIOUS COMORBIDITY AND BODY MASS INDEX (BMI) OF 37.0 TO 37.9 IN ADULT (HCC): ICD-10-CM

## 2018-02-14 DIAGNOSIS — J44.9 CHRONIC OBSTRUCTIVE PULMONARY DISEASE, UNSPECIFIED COPD TYPE (HCC): ICD-10-CM

## 2018-02-14 PROCEDURE — 99214 OFFICE O/P EST MOD 30 MIN: CPT | Performed by: FAMILY MEDICINE

## 2018-02-14 RX ORDER — GLYBURIDE 5 MG/1
TABLET ORAL
Qty: 360 TABLET | Refills: 1 | Status: SHIPPED | OUTPATIENT
Start: 2018-02-14 | End: 2018-05-27

## 2018-02-14 RX ORDER — OMEGA-3-ACID ETHYL ESTERS 1 G/1
CAPSULE, LIQUID FILLED ORAL
Qty: 360 CAPSULE | Refills: 1 | Status: SHIPPED | OUTPATIENT
Start: 2018-02-14 | End: 2018-05-27

## 2018-02-14 RX ORDER — VALSARTAN AND HYDROCHLOROTHIAZIDE 160; 25 MG/1; MG/1
TABLET ORAL
Qty: 90 TABLET | Refills: 1 | Status: SHIPPED | OUTPATIENT
Start: 2018-02-14 | End: 2018-09-10

## 2018-02-14 NOTE — PROGRESS NOTES
HPI:   Simeon Meadows is a 57year old male who presents for recheck of his diabetes, hypertension and dyslipidemia. Patient has not been checking his FBS's at home regularly. Last eye exam -- 3 months ago.   Patient presents for recheck of his hypert Result Value Ref Range   HgbA1C 7.2 (H) <5.7 %   Estimated Average Glucose 160 (H) 68 - 126 mg/dL         Current Outpatient Prescriptions:  Valsartan-Hydrochlorothiazide 160-25 MG Oral Tab Take 1 tablet by mouth daily Disp: 90 tablet Rfl: 1   glyBURIDE Comment:dog  Penicillins             Diarrhea  Tetanus Toxoids            Past Medical History:   Diagnosis Date   • Elizondo's esophagus    • Esophageal reflux    • Hiatal hernia    • High blood pressure    • High cholesterol    • Other and unspe Pulse 84   Resp 12   Ht 70\"   Wt 258 lb   BMI 37.02 kg/m²   GENERAL: well developed, well nourished,in no apparent distress, pleasant  SKIN: no rashes,no suspicious lesions  NECK: supple,no adenopathy,no bruits; no thyromegaly  LUNGS: clear to auscultatio with diabetic neuropathy, with long-term current use of insulin ([facility])  (primary encounter diagnosis)  Essential hypertension  Dyslipidemia  Vitamin d deficiency  Smoking  Class 2 severe obesity due to excess calories with serious comorbidity and body mass

## 2018-02-28 ENCOUNTER — MYAURORA ACCOUNT LINK (OUTPATIENT)
Dept: OTHER | Age: 63
End: 2018-02-28

## 2018-02-28 ENCOUNTER — PRIOR ORIGINAL RECORDS (OUTPATIENT)
Dept: OTHER | Age: 63
End: 2018-02-28

## 2018-03-01 LAB
ALBUMIN: 4.1 G/DL
ALKALINE PHOSPHATATE(ALK PHOS): 79 IU/L
BILIRUBIN TOTAL: 0.5 MG/DL
BUN: 16 MG/DL
CALCIUM: 9.6 MG/DL
CHLORIDE: 102 MEQ/L
CHOLESTEROL, TOTAL: 120 MG/DL
CREATININE, SERUM: 1.01 MG/DL
GLUCOSE: 213 MG/DL
HDL CHOLESTEROL: 42 MG/DL
HEMOGLOBIN A1C: 7.2 %
LDL CHOLESTEROL: 52 MG/DL
POTASSIUM, SERUM: 4.3 MEQ/L
PROTEIN, TOTAL: 7.6 G/DL
SGOT (AST): 15 IU/L
SGPT (ALT): 32 IU/L
SODIUM: 138 MEQ/L
TRIGLYCERIDES: 132 MG/DL
VITAMIN D 25-OH: 66.2 NG/ML

## 2018-03-01 NOTE — TELEPHONE ENCOUNTER
From: Sandra Stewart  Sent: 2/25/2018 6:22 PM CST  Subject: Medication Renewal Request    Sandra Stewart would like a refill of the following medications:     TAMSULOSIN HCL 0.4 MG Oral Cap Tracie Pizano, DO]   Patient Comment: Please send to [facility]

## 2018-03-02 RX ORDER — TAMSULOSIN HYDROCHLORIDE 0.4 MG/1
0.4 CAPSULE ORAL
Qty: 90 CAPSULE | Refills: 0 | Status: SHIPPED
Start: 2018-03-02 | End: 2018-05-16

## 2018-03-09 NOTE — TELEPHONE ENCOUNTER
From: Candelario Garcia  Sent: 3/7/2018 2:09 PM CST  Subject: Medication Renewal Request    Candelario Garcia would like a refill of the following medications:     Insulin Pen Needle (BD PEN NEEDLE SHORT U/F) 31G X 8 MM Does not apply Misc [Jo Ann Wilson

## 2018-03-10 RX ORDER — TIOTROPIUM BROMIDE 18 UG/1
18 CAPSULE ORAL; RESPIRATORY (INHALATION) DAILY
Qty: 90 CAPSULE | Refills: 0
Start: 2018-03-10

## 2018-03-12 NOTE — TELEPHONE ENCOUNTER
Not protocol medication. LOV :2/14/18 med check   Last labs done :2/08/18  Next appointment :5/23/18  Please see pending medication. Refill if appropriate.    Last refill:    Date:11/27/17  Amount :20 pen 1 refill   Medication: levemir flextouch 100ml

## 2018-03-27 RX ORDER — EZETIMIBE AND SIMVASTATIN 10; 40 MG/1; MG/1
TABLET ORAL
Qty: 90 TABLET | Refills: 1 | Status: SHIPPED
Start: 2018-03-27 | End: 2018-10-23

## 2018-03-27 NOTE — TELEPHONE ENCOUNTER
From: Sade Nascimento  Sent: 3/25/2018 6:27 PM CDT  Subject: Medication Renewal Request    Sade Nascimento would like a refill of the following medications:     Ezetimibe-Simvastatin (VYTORIN) 10-40 MG Oral Tab [Jo Ann Wilson DO]     JANUMET XR 50-1

## 2018-05-17 ENCOUNTER — APPOINTMENT (OUTPATIENT)
Dept: LAB | Age: 63
End: 2018-05-17
Attending: FAMILY MEDICINE
Payer: COMMERCIAL

## 2018-05-17 DIAGNOSIS — Z79.4 TYPE 2 DIABETES MELLITUS WITH DIABETIC NEUROPATHY, WITH LONG-TERM CURRENT USE OF INSULIN (HCC): ICD-10-CM

## 2018-05-17 DIAGNOSIS — E11.40 TYPE 2 DIABETES MELLITUS WITH DIABETIC NEUROPATHY, WITH LONG-TERM CURRENT USE OF INSULIN (HCC): ICD-10-CM

## 2018-05-17 DIAGNOSIS — E78.5 DYSLIPIDEMIA: ICD-10-CM

## 2018-05-17 PROCEDURE — 36415 COLL VENOUS BLD VENIPUNCTURE: CPT | Performed by: FAMILY MEDICINE

## 2018-05-17 RX ORDER — TAMSULOSIN HYDROCHLORIDE 0.4 MG/1
CAPSULE ORAL
Qty: 90 CAPSULE | Refills: 1 | Status: SHIPPED | OUTPATIENT
Start: 2018-05-17 | End: 2018-08-28

## 2018-05-17 RX ORDER — INSULIN DETEMIR 100 [IU]/ML
INJECTION, SOLUTION SUBCUTANEOUS
Qty: 60 ML | Refills: 0 | Status: SHIPPED | OUTPATIENT
Start: 2018-05-17 | End: 2018-10-23

## 2018-05-17 NOTE — TELEPHONE ENCOUNTER
Not protocol medication. LOV :2/14/18 med check diabetes   Last labs done :5/17/18  Next appointment :5/23/18  Please see pending medication. Refill if appropriate.    Last refill:    Date:3/02/18  Amount :90 tablets no refill   Medication: tamsulosin hcl 0.4mg     Date:11/27/17  Amount: 20 pen 1 refill   Medication: levemir flextouch 100 unit

## 2018-05-23 ENCOUNTER — OFFICE VISIT (OUTPATIENT)
Dept: FAMILY MEDICINE CLINIC | Facility: CLINIC | Age: 63
End: 2018-05-23

## 2018-05-23 VITALS
RESPIRATION RATE: 12 BRPM | SYSTOLIC BLOOD PRESSURE: 120 MMHG | HEIGHT: 70 IN | HEART RATE: 68 BPM | DIASTOLIC BLOOD PRESSURE: 70 MMHG | BODY MASS INDEX: 36.65 KG/M2 | WEIGHT: 256 LBS

## 2018-05-23 DIAGNOSIS — K57.30 DIVERTICULOSIS OF COLON: ICD-10-CM

## 2018-05-23 DIAGNOSIS — E11.40 TYPE 2 DIABETES MELLITUS WITH DIABETIC NEUROPATHY, WITH LONG-TERM CURRENT USE OF INSULIN (HCC): Primary | ICD-10-CM

## 2018-05-23 DIAGNOSIS — N40.1 BENIGN PROSTATIC HYPERPLASIA WITH LOWER URINARY TRACT SYMPTOMS, SYMPTOM DETAILS UNSPECIFIED: ICD-10-CM

## 2018-05-23 DIAGNOSIS — E78.5 DYSLIPIDEMIA: ICD-10-CM

## 2018-05-23 DIAGNOSIS — D12.6 BENIGN NEOPLASM OF COLON, UNSPECIFIED PART OF COLON: ICD-10-CM

## 2018-05-23 DIAGNOSIS — Z23 NEED FOR VACCINATION: ICD-10-CM

## 2018-05-23 DIAGNOSIS — K29.90 GASTRITIS AND GASTRODUODENITIS: ICD-10-CM

## 2018-05-23 DIAGNOSIS — I05.9 MITRAL VALVE ANNULAR CALCIFICATION: ICD-10-CM

## 2018-05-23 DIAGNOSIS — K44.9 DIAPHRAGMATIC HERNIA WITHOUT OBSTRUCTION AND WITHOUT GANGRENE: ICD-10-CM

## 2018-05-23 DIAGNOSIS — E66.01 CLASS 2 SEVERE OBESITY DUE TO EXCESS CALORIES WITH SERIOUS COMORBIDITY AND BODY MASS INDEX (BMI) OF 37.0 TO 37.9 IN ADULT (HCC): ICD-10-CM

## 2018-05-23 DIAGNOSIS — Z79.4 TYPE 2 DIABETES MELLITUS WITH DIABETIC NEUROPATHY, WITH LONG-TERM CURRENT USE OF INSULIN (HCC): Primary | ICD-10-CM

## 2018-05-23 DIAGNOSIS — K29.70 GASTRITIS AND GASTRODUODENITIS: ICD-10-CM

## 2018-05-23 DIAGNOSIS — K22.719 BARRETT'S ESOPHAGUS WITH DYSPLASIA: ICD-10-CM

## 2018-05-23 DIAGNOSIS — I35.9 AORTIC VALVE CALCIFICATION: ICD-10-CM

## 2018-05-23 DIAGNOSIS — I10 ESSENTIAL HYPERTENSION: ICD-10-CM

## 2018-05-23 DIAGNOSIS — J44.9 CHRONIC OBSTRUCTIVE PULMONARY DISEASE, UNSPECIFIED COPD TYPE (HCC): ICD-10-CM

## 2018-05-23 DIAGNOSIS — K20.90 ESOPHAGITIS: ICD-10-CM

## 2018-05-23 DIAGNOSIS — F17.200 SMOKING: ICD-10-CM

## 2018-05-23 DIAGNOSIS — K64.4 EXTERNAL HEMORRHOIDS: ICD-10-CM

## 2018-05-23 DIAGNOSIS — K80.20 GALLSTONES: ICD-10-CM

## 2018-05-23 DIAGNOSIS — Z80.0 FAMILY HISTORY OF MALIGNANT NEOPLASM OF GASTROINTESTINAL TRACT: ICD-10-CM

## 2018-05-23 DIAGNOSIS — I35.0 MILD AORTIC STENOSIS: ICD-10-CM

## 2018-05-23 DIAGNOSIS — E55.9 VITAMIN D DEFICIENCY: ICD-10-CM

## 2018-05-23 DIAGNOSIS — Z87.39 HISTORY OF GOUT: ICD-10-CM

## 2018-05-23 PROBLEM — K22.70 BARRETT'S ESOPHAGUS: Status: ACTIVE | Noted: 2018-05-23

## 2018-05-23 PROCEDURE — 90670 PCV13 VACCINE IM: CPT | Performed by: FAMILY MEDICINE

## 2018-05-23 PROCEDURE — 99214 OFFICE O/P EST MOD 30 MIN: CPT | Performed by: FAMILY MEDICINE

## 2018-05-23 PROCEDURE — G0009 ADMIN PNEUMOCOCCAL VACCINE: HCPCS | Performed by: FAMILY MEDICINE

## 2018-05-23 NOTE — PROGRESS NOTES
HPI:   Sandra Stewart is a 70year old male who presents for recheck of his diabetes, hypertension and dyslipidemia. Patient has not been checking his FBS's at home regularly. Last eye exam -- 6 months ago.   Patient presents for recheck of his hypert 5 - 40 mg/dL   Chol/HDL Ratio 2.33 <4.97   Non HDL Chol 56 <130 mg/dL   -HEMOGLOBIN A1C   Result Value Ref Range   HgbA1C 6.8 (H) <5.7 %   Estimated Average Glucose 148 (H) 68 - 126 mg/dL   -MICROALB/CREAT RATIO, RANDOM URINE   Result Value Ref Range   Anam 1   Niacin CR (SLO-NIACIN) 500 MG Oral Tab CR Take 1 tablet by mouth 2 (two) times daily with meals. Disp:  Rfl:    Aspirin 81 MG Oral Chew Tab Chew 81 mg by mouth daily. Disp:  Rfl:    Multiple Vitamins-Minerals (CENTRUM SILVER OR) Take  by mouth.  Disp: with exertion  CARDIOVASCULAR: denies chest pain on exertion  GI: denies abdominal pain and denies nausea or vomitting  NEURO: denies headaches, dizziness, numbness or tingling  MUSCULOSKELETAL: denies any joint aches or pain.  Denies any muscle aches or cr really interested in quitting, but possibly after fishing trip. Vitamin d def -- stable  Aortic stenosis -- stable  Hx of gout -- Uric acid is stable. HTN -- stable, continue monitoring BP at home regularly.   COPD -- stable on spiriva  DYSLIPIDEMIA-- Co agrees to the plan. The patient is asked to return in 4-5 months.

## 2018-05-31 NOTE — TELEPHONE ENCOUNTER
From: Jermaine Mendoza  Sent: 5/27/2018 9:28 AM CDT  Subject: Medication Renewal Request    Jermaine Mendoza would like a refill of the following medications:     glyBURIDE 5 MG Oral Tab [Jo Ann Wilson DO]     Wbace-2-qxta Ethyl Esters 1 g Oral Cap [facility]

## 2018-06-01 RX ORDER — OMEGA-3-ACID ETHYL ESTERS 1 G/1
CAPSULE, LIQUID FILLED ORAL
Qty: 360 CAPSULE | Refills: 1 | Status: SHIPPED
Start: 2018-06-01 | End: 2018-09-04

## 2018-06-01 RX ORDER — GLYBURIDE 5 MG/1
TABLET ORAL
Qty: 360 TABLET | Refills: 1 | Status: SHIPPED
Start: 2018-06-01 | End: 2018-09-04

## 2018-08-17 ENCOUNTER — PATIENT MESSAGE (OUTPATIENT)
Dept: FAMILY MEDICINE CLINIC | Facility: CLINIC | Age: 63
End: 2018-08-17

## 2018-08-20 NOTE — TELEPHONE ENCOUNTER
From: Rohan Atkins  To: Erika North DO  Sent: 8/17/2018 3:45 PM CDT  Subject: Other    I had the first Shingles Vaccine shot on 5/23/2018 and the second on 8/17/2018.  (sending this because I know I'll forget to bring information to my next appoint

## 2018-08-28 RX ORDER — TAMSULOSIN HYDROCHLORIDE 0.4 MG/1
0.4 CAPSULE ORAL
Qty: 90 CAPSULE | Refills: 1 | Status: SHIPPED | OUTPATIENT
Start: 2018-08-28 | End: 2019-03-21

## 2018-08-29 ENCOUNTER — E-VISIT (OUTPATIENT)
Dept: FAMILY MEDICINE CLINIC | Facility: CLINIC | Age: 63
End: 2018-08-29

## 2018-08-29 ENCOUNTER — OFFICE VISIT (OUTPATIENT)
Dept: FAMILY MEDICINE CLINIC | Facility: CLINIC | Age: 63
End: 2018-08-29
Payer: MEDICARE

## 2018-08-29 VITALS
HEIGHT: 70 IN | RESPIRATION RATE: 18 BRPM | WEIGHT: 258 LBS | DIASTOLIC BLOOD PRESSURE: 74 MMHG | HEART RATE: 98 BPM | SYSTOLIC BLOOD PRESSURE: 124 MMHG | BODY MASS INDEX: 36.94 KG/M2 | TEMPERATURE: 99 F

## 2018-08-29 DIAGNOSIS — N30.01 ACUTE CYSTITIS WITH HEMATURIA: Primary | ICD-10-CM

## 2018-08-29 DIAGNOSIS — Z02.9 ENCOUNTERS FOR ADMINISTRATIVE PURPOSE: Primary | ICD-10-CM

## 2018-08-29 LAB
GLUCOSE (URINE DIPSTICK): 100 MG/DL
MULTISTIX LOT#: ABNORMAL NUMERIC
PH, URINE: 5.5 (ref 4.5–8)
PROTEIN (URINE DIPSTICK): 100 MG/DL
SPECIFIC GRAVITY: <=1.005 (ref 1–1.03)
URINE-COLOR: YELLOW
UROBILINOGEN,SEMI-QN: 0.2 MG/DL (ref 0–1.9)

## 2018-08-29 PROCEDURE — 81003 URINALYSIS AUTO W/O SCOPE: CPT | Performed by: NURSE PRACTITIONER

## 2018-08-29 PROCEDURE — 87086 URINE CULTURE/COLONY COUNT: CPT | Performed by: NURSE PRACTITIONER

## 2018-08-29 PROCEDURE — 87077 CULTURE AEROBIC IDENTIFY: CPT | Performed by: NURSE PRACTITIONER

## 2018-08-29 PROCEDURE — 99213 OFFICE O/P EST LOW 20 MIN: CPT | Performed by: NURSE PRACTITIONER

## 2018-08-29 PROCEDURE — 98969 ONLINE SERVICE BY HC PRO: CPT | Performed by: NURSE PRACTITIONER

## 2018-08-29 RX ORDER — SULFAMETHOXAZOLE AND TRIMETHOPRIM 800; 160 MG/1; MG/1
1 TABLET ORAL 2 TIMES DAILY
Qty: 28 TABLET | Refills: 0 | Status: SHIPPED | OUTPATIENT
Start: 2018-08-29 | End: 2018-08-31 | Stop reason: ALTCHOICE

## 2018-08-29 NOTE — PROGRESS NOTES
Candelario Garcia is a 70year old male. HPI:   See answers to questions above. Current Outpatient Prescriptions:  tamsulosin HCl 0.4 MG Oral Cap Take 1 capsule (0.4 mg total) by mouth once daily.  Disp: 90 capsule Rfl: 1   glyBURIDE 5 MG Oral Tab T mouth. Disp:  Rfl:       Past Medical History:   Diagnosis Date   • Elizondo's esophagus    • Esophageal reflux    • Hiatal hernia    • High blood pressure    • High cholesterol    • Other and unspecified hyperlipidemia    • Pneumonia, organism unspecified( Encounters for administrative purpose  (primary encounter diagnosis)    Meds & Refills for this Visit:  No prescriptions requested or ordered in this encounter     After evaluating patient's e-visit questionnaire, it was recommended that the patient fo

## 2018-08-29 NOTE — PROGRESS NOTES
Nba Duggan is a 70year old male. HPI:   Patient presents today reporting a 3 day history of urinary frequency and urinary urgency. Patient reports upon urination sometimes experiencing a slight burning sensation but not always.   Patient denies a previous qty on this. Pt uses up to 4x a day. Pt uses 360 a month~ Disp: 400 each Rfl: 1   Tiotropium Bromide Monohydrate (SPIRIVA HANDIHALER) 18 MCG Inhalation Cap Inhale 1 capsule (18 mcg total) into the lungs daily.  Disp: 90 capsule Rfl: 1   Insulin Asp pain  : reports urinary urgency and frequency. Reports occasional slight dysuria. Denies hematuria.   Musculoskeletal: denies low back pain  EXAM:   /74   Pulse 98   Temp 98.8 °F (37.1 °C) (Oral)   Resp 18   Ht 70\"   Wt 258 lb   BMI 37.02 kg/m²   G pain.  Patient voiced understanding and agreeable to the plan of care.

## 2018-08-31 DIAGNOSIS — R82.90 ABNORMAL URINALYSIS: Primary | ICD-10-CM

## 2018-08-31 RX ORDER — NITROFURANTOIN 25; 75 MG/1; MG/1
100 CAPSULE ORAL 2 TIMES DAILY
Qty: 28 CAPSULE | Refills: 0 | Status: SHIPPED | OUTPATIENT
Start: 2018-08-31 | End: 2018-10-23 | Stop reason: ALTCHOICE

## 2018-09-05 RX ORDER — GLYBURIDE 5 MG/1
TABLET ORAL
Qty: 360 TABLET | Refills: 1
Start: 2018-09-05 | End: 2018-09-06 | Stop reason: CLARIF

## 2018-09-05 RX ORDER — OMEGA-3-ACID ETHYL ESTERS 1 G/1
CAPSULE, LIQUID FILLED ORAL
Qty: 360 CAPSULE | Refills: 1
Start: 2018-09-05 | End: 2018-09-06 | Stop reason: CLARIF

## 2018-09-05 NOTE — TELEPHONE ENCOUNTER
From: Sandra Stewart  Sent: 9/4/2018 11:55 AM CDT  Subject: Medication Renewal Request    Sandra Stewart would like a refill of the following medications:     glyBURIDE 5 MG Oral Tab Chela Laureano DO]   Patient Comment: 90 day supply     Omega-3-

## 2018-09-06 RX ORDER — GLYBURIDE 5 MG/1
TABLET ORAL
Qty: 360 TABLET | Refills: 1 | Status: SHIPPED | OUTPATIENT
Start: 2018-09-06 | End: 2019-03-21

## 2018-09-06 RX ORDER — OMEGA-3-ACID ETHYL ESTERS 1 G/1
CAPSULE, LIQUID FILLED ORAL
Qty: 360 CAPSULE | Refills: 1 | Status: SHIPPED | OUTPATIENT
Start: 2018-09-06 | End: 2019-04-18

## 2018-09-10 RX ORDER — VALSARTAN AND HYDROCHLOROTHIAZIDE 160; 25 MG/1; MG/1
TABLET ORAL
Qty: 90 TABLET | Refills: 1 | Status: SHIPPED
Start: 2018-09-10 | End: 2019-03-21

## 2018-09-17 ENCOUNTER — APPOINTMENT (OUTPATIENT)
Dept: LAB | Age: 63
End: 2018-09-17
Attending: FAMILY MEDICINE
Payer: MEDICARE

## 2018-09-17 DIAGNOSIS — R82.90 ABNORMAL URINALYSIS: ICD-10-CM

## 2018-09-17 PROCEDURE — 87086 URINE CULTURE/COLONY COUNT: CPT

## 2018-10-15 ENCOUNTER — APPOINTMENT (OUTPATIENT)
Dept: LAB | Age: 63
End: 2018-10-15
Attending: FAMILY MEDICINE
Payer: MEDICARE

## 2018-10-15 DIAGNOSIS — E11.40 TYPE 2 DIABETES MELLITUS WITH DIABETIC NEUROPATHY, WITH LONG-TERM CURRENT USE OF INSULIN (HCC): ICD-10-CM

## 2018-10-15 DIAGNOSIS — E78.5 DYSLIPIDEMIA: ICD-10-CM

## 2018-10-15 DIAGNOSIS — Z79.4 TYPE 2 DIABETES MELLITUS WITH DIABETIC NEUROPATHY, WITH LONG-TERM CURRENT USE OF INSULIN (HCC): ICD-10-CM

## 2018-10-15 PROCEDURE — 80061 LIPID PANEL: CPT

## 2018-10-15 PROCEDURE — 36415 COLL VENOUS BLD VENIPUNCTURE: CPT

## 2018-10-15 PROCEDURE — 82043 UR ALBUMIN QUANTITATIVE: CPT

## 2018-10-15 PROCEDURE — 83036 HEMOGLOBIN GLYCOSYLATED A1C: CPT

## 2018-10-15 PROCEDURE — 82570 ASSAY OF URINE CREATININE: CPT

## 2018-10-15 PROCEDURE — 80053 COMPREHEN METABOLIC PANEL: CPT

## 2018-10-23 ENCOUNTER — OFFICE VISIT (OUTPATIENT)
Dept: FAMILY MEDICINE CLINIC | Facility: CLINIC | Age: 63
End: 2018-10-23
Payer: MEDICARE

## 2018-10-23 VITALS
RESPIRATION RATE: 12 BRPM | BODY MASS INDEX: 36.79 KG/M2 | WEIGHT: 257 LBS | SYSTOLIC BLOOD PRESSURE: 130 MMHG | DIASTOLIC BLOOD PRESSURE: 74 MMHG | HEART RATE: 78 BPM | HEIGHT: 70 IN

## 2018-10-23 DIAGNOSIS — K64.4 EXTERNAL HEMORRHOID: ICD-10-CM

## 2018-10-23 DIAGNOSIS — E11.40 TYPE 2 DIABETES MELLITUS WITH DIABETIC NEUROPATHY, WITH LONG-TERM CURRENT USE OF INSULIN (HCC): Primary | ICD-10-CM

## 2018-10-23 DIAGNOSIS — Z79.4 TYPE 2 DIABETES MELLITUS WITH DIABETIC NEUROPATHY, WITH LONG-TERM CURRENT USE OF INSULIN (HCC): Primary | ICD-10-CM

## 2018-10-23 DIAGNOSIS — J44.9 CHRONIC OBSTRUCTIVE PULMONARY DISEASE, UNSPECIFIED COPD TYPE (HCC): ICD-10-CM

## 2018-10-23 DIAGNOSIS — E66.01 CLASS 2 SEVERE OBESITY DUE TO EXCESS CALORIES WITH SERIOUS COMORBIDITY AND BODY MASS INDEX (BMI) OF 37.0 TO 37.9 IN ADULT (HCC): ICD-10-CM

## 2018-10-23 DIAGNOSIS — K22.719 BARRETT'S ESOPHAGUS WITH DYSPLASIA: ICD-10-CM

## 2018-10-23 DIAGNOSIS — Z87.39 HISTORY OF GOUT: ICD-10-CM

## 2018-10-23 DIAGNOSIS — I05.9 MITRAL VALVE ANNULAR CALCIFICATION: ICD-10-CM

## 2018-10-23 DIAGNOSIS — I10 ESSENTIAL HYPERTENSION: ICD-10-CM

## 2018-10-23 DIAGNOSIS — F17.200 SMOKING: ICD-10-CM

## 2018-10-23 DIAGNOSIS — E78.5 DYSLIPIDEMIA: ICD-10-CM

## 2018-10-23 DIAGNOSIS — E55.9 VITAMIN D DEFICIENCY: ICD-10-CM

## 2018-10-23 DIAGNOSIS — I35.9 AORTIC VALVE CALCIFICATION: ICD-10-CM

## 2018-10-23 DIAGNOSIS — I35.0 MILD AORTIC STENOSIS: ICD-10-CM

## 2018-10-23 PROCEDURE — 99214 OFFICE O/P EST MOD 30 MIN: CPT | Performed by: FAMILY MEDICINE

## 2018-10-23 PROCEDURE — G0008 ADMIN INFLUENZA VIRUS VAC: HCPCS | Performed by: FAMILY MEDICINE

## 2018-10-23 PROCEDURE — 90653 IIV ADJUVANT VACCINE IM: CPT | Performed by: FAMILY MEDICINE

## 2018-10-23 RX ORDER — EZETIMIBE AND SIMVASTATIN 10; 40 MG/1; MG/1
TABLET ORAL
Qty: 45 TABLET | Refills: 1 | Status: SHIPPED | OUTPATIENT
Start: 2018-10-23 | End: 2019-10-07

## 2018-10-23 NOTE — PROGRESS NOTES
HPI:   Cathay Brittle is a 70year old male who presents for recheck of his diabetes, hypertension and dyslipidemia. Patient has not been checking his FBS's at home regularly. Last eye exam -- 3 months ago.   Patient presents for recheck of his hypert 157 (H) 68 - 126 mg/dL   MICROALB/CREAT RATIO, RANDOM URINE   Result Value Ref Range    Microalbumin, Urine 4.75 mg/dL    Creatinine Ur Random 77.40 mg/dL    Malb/Cre Calc 61.4 (H) <=30.0 ug/mg   LIPID PANEL   Result Value Ref Range    Cholesterol, Total 1 depending on diet plan Disp: 360 each Rfl: 1   Niacin CR (SLO-NIACIN) 500 MG Oral Tab CR Take 1 tablet by mouth 2 (two) times daily with meals. Disp:  Rfl:    Aspirin 81 MG Oral Chew Tab Chew 81 mg by mouth daily.  Disp:  Rfl:    Multiple Vitamins-Minerals social    Drug use: No    Exercise: around the house.   Diet: watches fats closely, watches sugar closely and watches calories closely     REVIEW OF SYSTEMS:   GENERAL HEALTH: feels well otherwise  SKIN: denies any unusual skin lesions or rashes  EYES: kylah daily.    Monitor FBS and BP readings at home regularly and log results. Cont. glyburide to 2 tabs BID, janumet, and levemir. A1c is up to 7.1  Increase levemir and change to 30 units BID from 55 units nightly. Immunizations reviewed.   Smoking -- s UNIT/ML Subcutaneous Solution Pen-injector 10 pen 2     Sig: INJECT 20 UNITS SUBCUTANEOU SLY 3 TIMES DAILY BEFORE MEALS   • insulin detemir (LEVEMIR FLEXTOUCH) 100 UNIT/ML Subcutaneous Solution Pen-injector 10 pen 2     Sig: INJECT 30 UNITS UNDER THE SKIN

## 2018-11-08 ENCOUNTER — OFFICE VISIT (OUTPATIENT)
Dept: SURGERY | Facility: CLINIC | Age: 63
End: 2018-11-08
Payer: MEDICARE

## 2018-11-08 VITALS
SYSTOLIC BLOOD PRESSURE: 148 MMHG | DIASTOLIC BLOOD PRESSURE: 86 MMHG | BODY MASS INDEX: 36.79 KG/M2 | RESPIRATION RATE: 18 BRPM | WEIGHT: 257 LBS | HEIGHT: 70 IN | HEART RATE: 92 BPM

## 2018-11-08 DIAGNOSIS — K60.2 ANAL FISSURE: Primary | ICD-10-CM

## 2018-11-08 DIAGNOSIS — K64.2 PROLAPSED INTERNAL HEMORRHOIDS, GRADE 3: ICD-10-CM

## 2018-11-08 PROCEDURE — 99204 OFFICE O/P NEW MOD 45 MIN: CPT | Performed by: COLON & RECTAL SURGERY

## 2018-11-08 PROCEDURE — 46600 DIAGNOSTIC ANOSCOPY SPX: CPT | Performed by: COLON & RECTAL SURGERY

## 2018-11-08 NOTE — H&P
New Patient Visit Note       Active Problems      1. Anal fissure    2. Prolapsed internal hemorrhoids, grade 3        Chief Complaint   Patient presents with:  Hemorrhoids: [facility] PT ref by PCP for hemorrhoids. PT has been having issues since April 2018.  PT h you have drainage from the anus? yes   Do you have a growth around the anus? Do you feel ripping at the anus with a BM? yes   Did you ever have anal warts? Date of first symptoms? May 2018   How often does it happen?  About 1/2 of days   If you had physical exam, and developed an assessment and plan. The physician performed all medical decision making. Arvin John    I agree with the note as scribed by the PA  I performed my own physical exam and obtained the history as detailed above.   I Tiffany Mcneil MD;  Location: [facility] ENDOSCOPY       The family history and social history have been reviewed by me today.     Family History   Problem Relation Age of Onset   • Other (CHF[other]) Mother    • Diabetes Mother         Type II   • Diabetes Father Belt: Not Asked        Self-Exams: Not Asked    Social History Narrative      Not on file       Current Outpatient Medications:  Nitroglycerin 0.4 % Rectal Ointment Apply pea sized amount BID -TID Disp: 1 Tube Rfl: 0   Ezetimibe-Simvastatin (VYTORIN) 10-40 Chew Tab Chew 81 mg by mouth daily. Disp:  Rfl:          Review of Systems  The Review of Systems has been reviewed by me during today. Review of Systems   Constitutional: Negative for chills, diaphoresis, fatigue, fever and unexpected weight change.    HE no tenderness. There is no rigidity, no rebound, no guarding, no CVA tenderness, no tenderness at McBurney's point and negative Escamilla's sign. No hernia.  Hernia confirmed negative in the ventral area, confirmed negative in the right inguinal area and confi The patient states that he has rare bleeding with wiping. This is only after several bowel movements. The patient is currently taking MiraLAX half a capful approximately every other day.   This can lead to multiple stools daily and so he tapers this as nitroglycerin ointment. He should place a pea-sized amount on the anal skin. This will allow the skin to relax and increase blood flow to heal the anal fissure.     The patient was also provided with a handout regarding dietary and hygienic modifications

## 2018-11-09 PROBLEM — K60.2 ANAL FISSURE: Status: ACTIVE | Noted: 2018-11-09

## 2018-11-09 PROBLEM — K64.2 PROLAPSED INTERNAL HEMORRHOIDS, GRADE 3: Status: ACTIVE | Noted: 2018-11-09

## 2018-11-09 NOTE — PATIENT INSTRUCTIONS
This patient presents at the referral of his primary care physician, Dr. Chidi Wild. The patient states that he had prior issues with hemorrhoids in his 25s. They would always resolve spontaneously without intervention.     The patient states that adam office visit I discussed with the patient that he has an anal fissure as well as large grade 3 internal hemorrhoids. The patient's current symptoms seem most consistent with his anal fissure.     The patient should continue MiraLAX half a capful every othe

## 2018-11-09 NOTE — PROCEDURES
Procedure:  Anoscopy    Surgeon: Gucci Brooke    Anesthesia: None    Findings: See the progress note attached for all findings    Operative Summary: The patient was placed in a prone position on the proctoscopy table, the hips were flexed in the jackknife p

## 2018-12-10 ENCOUNTER — OFFICE VISIT (OUTPATIENT)
Dept: SURGERY | Facility: CLINIC | Age: 63
End: 2018-12-10
Payer: MEDICARE

## 2018-12-10 VITALS
HEART RATE: 85 BPM | HEIGHT: 70 IN | WEIGHT: 258 LBS | SYSTOLIC BLOOD PRESSURE: 144 MMHG | DIASTOLIC BLOOD PRESSURE: 79 MMHG | BODY MASS INDEX: 36.94 KG/M2

## 2018-12-10 DIAGNOSIS — K60.2 ANAL FISSURE: Primary | ICD-10-CM

## 2018-12-10 DIAGNOSIS — K64.4 EXTERNAL PROLAPSED HEMORRHOIDS: ICD-10-CM

## 2018-12-10 DIAGNOSIS — K64.2 PROLAPSED INTERNAL HEMORRHOIDS, GRADE 3: ICD-10-CM

## 2018-12-10 PROCEDURE — 99213 OFFICE O/P EST LOW 20 MIN: CPT | Performed by: COLON & RECTAL SURGERY

## 2018-12-10 NOTE — PROGRESS NOTES
Follow Up Visit Note       Active Problems      1. Anal fissure    2. Prolapsed internal hemorrhoids, grade 3    3.  External prolapsed hemorrhoids          Chief Complaint   Patient presents with:  Hemorrhoids: EST PT - 1 month continued care and treatment • ESOPHAGOGASTRODUODENOSCOPY (EGD) N/A 7/2/2015    Performed by Caridad Stover MD at [facility] ENDOSCOPY   • OTHER SURGICAL HISTORY  07/07/2008    Esophagogastroduodenscopy   • OTHER SURGICAL HISTORY  06/2010    Surgery for ablation of Elizondo's Esophagus   • mouth 2 (two) times daily. Disp: 180 tablet Rfl: 1   Tiotropium Bromide Monohydrate (SPIRIVA HANDIHALER) 18 MCG Inhalation Cap Inhale 1 capsule (18 mcg total) into the lungs daily.  Disp: 90 capsule Rfl: 1   Insulin Aspart Pen (NOVOLOG FLEXPEN) 100 UNIT/ML wheezing. Cardiovascular: Negative for chest pain, palpitations and leg swelling. Gastrointestinal: Negative for abdominal distention, abdominal pain, anal bleeding, blood in stool, constipation, diarrhea, nausea and vomiting.    Genitourinary: [place] lateral external hemorrhoid. He still remains with some internal hemorrhoids. The pruritus ani has completely cleared. There are no fistulae or abscesses.   The prostate is normal.    This patient will continue his dietary and hygienic modifications to i

## 2018-12-15 PROBLEM — K64.4 EXTERNAL PROLAPSED HEMORRHOIDS: Status: ACTIVE | Noted: 2018-12-15

## 2018-12-15 NOTE — PATIENT INSTRUCTIONS
I am seeing this patient for further consultation regarding anal pain, prolapse, and hemorrhoids. At his last office visit it was discovered that he had a fissure.   He was there is, topical nitroglycerin ointment, and given dietary and hygienic modifica

## 2019-01-18 ENCOUNTER — APPOINTMENT (OUTPATIENT)
Dept: LAB | Age: 64
End: 2019-01-18
Attending: FAMILY MEDICINE
Payer: MEDICARE

## 2019-01-18 ENCOUNTER — PRIOR ORIGINAL RECORDS (OUTPATIENT)
Dept: OTHER | Age: 64
End: 2019-01-18

## 2019-01-18 DIAGNOSIS — Z79.4 TYPE 2 DIABETES MELLITUS WITH DIABETIC NEUROPATHY, WITH LONG-TERM CURRENT USE OF INSULIN (HCC): ICD-10-CM

## 2019-01-18 DIAGNOSIS — E11.40 TYPE 2 DIABETES MELLITUS WITH DIABETIC NEUROPATHY, WITH LONG-TERM CURRENT USE OF INSULIN (HCC): ICD-10-CM

## 2019-01-18 DIAGNOSIS — E78.5 DYSLIPIDEMIA: ICD-10-CM

## 2019-01-18 LAB
ALBUMIN SERPL-MCNC: 3.9 G/DL (ref 3.1–4.5)
ALBUMIN/GLOB SERPL: 1.1 {RATIO} (ref 1–2)
ALP LIVER SERPL-CCNC: 88 U/L (ref 45–117)
ALT SERPL-CCNC: 33 U/L (ref 17–63)
ANION GAP SERPL CALC-SCNC: 8 MMOL/L (ref 0–18)
AST SERPL-CCNC: 14 U/L (ref 15–41)
BILIRUB SERPL-MCNC: 0.7 MG/DL (ref 0.1–2)
BUN BLD-MCNC: 25 MG/DL (ref 8–20)
BUN/CREAT SERPL: 23.1 (ref 10–20)
CALCIUM BLD-MCNC: 9.3 MG/DL (ref 8.3–10.3)
CHLORIDE SERPL-SCNC: 105 MMOL/L (ref 101–111)
CHOLEST SMN-MCNC: 117 MG/DL (ref ?–200)
CO2 SERPL-SCNC: 28 MMOL/L (ref 22–32)
CREAT BLD-MCNC: 1.08 MG/DL (ref 0.7–1.3)
CREAT UR-SCNC: 99.4 MG/DL
EST. AVERAGE GLUCOSE BLD GHB EST-MCNC: 171 MG/DL (ref 68–126)
GLOBULIN PLAS-MCNC: 3.7 G/DL (ref 2.8–4.4)
GLUCOSE BLD-MCNC: 196 MG/DL (ref 70–99)
HBA1C MFR BLD HPLC: 7.6 % (ref ?–5.7)
HDLC SERPL-MCNC: 37 MG/DL (ref 40–59)
LDLC SERPL CALC-MCNC: 55 MG/DL (ref ?–100)
M PROTEIN MFR SERPL ELPH: 7.6 G/DL (ref 6.4–8.2)
MICROALBUMIN UR-MCNC: 5.71 MG/DL
MICROALBUMIN/CREAT 24H UR-RTO: 57.4 UG/MG (ref ?–30)
NONHDLC SERPL-MCNC: 80 MG/DL (ref ?–130)
OSMOLALITY SERPL CALC.SUM OF ELEC: 302 MOSM/KG (ref 275–295)
POTASSIUM SERPL-SCNC: 3.9 MMOL/L (ref 3.6–5.1)
SODIUM SERPL-SCNC: 141 MMOL/L (ref 136–144)
TRIGL SERPL-MCNC: 124 MG/DL (ref 30–149)
VLDLC SERPL CALC-MCNC: 25 MG/DL (ref 0–30)

## 2019-01-18 PROCEDURE — 82043 UR ALBUMIN QUANTITATIVE: CPT

## 2019-01-18 PROCEDURE — 80053 COMPREHEN METABOLIC PANEL: CPT

## 2019-01-18 PROCEDURE — 80061 LIPID PANEL: CPT

## 2019-01-18 PROCEDURE — 82570 ASSAY OF URINE CREATININE: CPT

## 2019-01-18 PROCEDURE — 83036 HEMOGLOBIN GLYCOSYLATED A1C: CPT

## 2019-01-18 PROCEDURE — 36415 COLL VENOUS BLD VENIPUNCTURE: CPT

## 2019-01-24 ENCOUNTER — OFFICE VISIT (OUTPATIENT)
Dept: FAMILY MEDICINE CLINIC | Facility: CLINIC | Age: 64
End: 2019-01-24
Payer: MEDICARE

## 2019-01-24 VITALS
DIASTOLIC BLOOD PRESSURE: 60 MMHG | HEIGHT: 70 IN | RESPIRATION RATE: 15 BRPM | TEMPERATURE: 97 F | SYSTOLIC BLOOD PRESSURE: 110 MMHG | WEIGHT: 257 LBS | BODY MASS INDEX: 36.79 KG/M2 | HEART RATE: 84 BPM

## 2019-01-24 DIAGNOSIS — K60.2 ANAL FISSURE: ICD-10-CM

## 2019-01-24 DIAGNOSIS — Z87.39 HISTORY OF GOUT: ICD-10-CM

## 2019-01-24 DIAGNOSIS — E11.69 DIABETES MELLITUS TYPE 2 IN OBESE (HCC): ICD-10-CM

## 2019-01-24 DIAGNOSIS — K80.20 GALLSTONES: ICD-10-CM

## 2019-01-24 DIAGNOSIS — E11.40 TYPE 2 DIABETES MELLITUS WITH DIABETIC NEUROPATHY, WITH LONG-TERM CURRENT USE OF INSULIN (HCC): ICD-10-CM

## 2019-01-24 DIAGNOSIS — E78.5 DYSLIPIDEMIA: ICD-10-CM

## 2019-01-24 DIAGNOSIS — E55.9 VITAMIN D DEFICIENCY: ICD-10-CM

## 2019-01-24 DIAGNOSIS — Z80.0 FAMILY HISTORY OF MALIGNANT NEOPLASM OF GASTROINTESTINAL TRACT: ICD-10-CM

## 2019-01-24 DIAGNOSIS — Z00.00 ENCOUNTER FOR ANNUAL HEALTH EXAMINATION: Primary | ICD-10-CM

## 2019-01-24 DIAGNOSIS — Z79.4 TYPE 2 DIABETES MELLITUS WITH DIABETIC NEUROPATHY, WITH LONG-TERM CURRENT USE OF INSULIN (HCC): ICD-10-CM

## 2019-01-24 DIAGNOSIS — K64.2 PROLAPSED INTERNAL HEMORRHOIDS, GRADE 3: ICD-10-CM

## 2019-01-24 DIAGNOSIS — D12.6 BENIGN NEOPLASM OF COLON, UNSPECIFIED PART OF COLON: ICD-10-CM

## 2019-01-24 DIAGNOSIS — K29.90 GASTRITIS AND GASTRODUODENITIS: ICD-10-CM

## 2019-01-24 DIAGNOSIS — K44.9 DIAPHRAGMATIC HERNIA WITHOUT OBSTRUCTION AND WITHOUT GANGRENE: ICD-10-CM

## 2019-01-24 DIAGNOSIS — E66.9 DIABETES MELLITUS TYPE 2 IN OBESE (HCC): ICD-10-CM

## 2019-01-24 DIAGNOSIS — J44.9 CHRONIC OBSTRUCTIVE PULMONARY DISEASE, UNSPECIFIED COPD TYPE (HCC): ICD-10-CM

## 2019-01-24 DIAGNOSIS — K64.4 EXTERNAL PROLAPSED HEMORRHOIDS: ICD-10-CM

## 2019-01-24 DIAGNOSIS — K20.90 ESOPHAGITIS: ICD-10-CM

## 2019-01-24 DIAGNOSIS — K29.70 GASTRITIS AND GASTRODUODENITIS: ICD-10-CM

## 2019-01-24 DIAGNOSIS — I35.0 MILD AORTIC STENOSIS: ICD-10-CM

## 2019-01-24 DIAGNOSIS — K64.4 EXTERNAL HEMORRHOID: ICD-10-CM

## 2019-01-24 DIAGNOSIS — Z13.0 SCREENING FOR DEFICIENCY ANEMIA: ICD-10-CM

## 2019-01-24 DIAGNOSIS — F17.200 SMOKING: ICD-10-CM

## 2019-01-24 DIAGNOSIS — L29.0 PRURITUS ANI: ICD-10-CM

## 2019-01-24 DIAGNOSIS — Z11.59 NEED FOR HEPATITIS C SCREENING TEST: ICD-10-CM

## 2019-01-24 DIAGNOSIS — I10 ESSENTIAL HYPERTENSION: ICD-10-CM

## 2019-01-24 DIAGNOSIS — I05.9 MITRAL VALVE ANNULAR CALCIFICATION: ICD-10-CM

## 2019-01-24 DIAGNOSIS — I35.9 AORTIC VALVE CALCIFICATION: ICD-10-CM

## 2019-01-24 DIAGNOSIS — K57.30 DIVERTICULOSIS OF COLON: ICD-10-CM

## 2019-01-24 DIAGNOSIS — K22.719 BARRETT'S ESOPHAGUS WITH DYSPLASIA: ICD-10-CM

## 2019-01-24 DIAGNOSIS — N40.1 BENIGN PROSTATIC HYPERPLASIA WITH LOWER URINARY TRACT SYMPTOMS, SYMPTOM DETAILS UNSPECIFIED: ICD-10-CM

## 2019-01-24 PROCEDURE — G0447 BEHAVIOR COUNSEL OBESITY 15M: HCPCS | Performed by: FAMILY MEDICINE

## 2019-01-24 PROCEDURE — G0403 EKG FOR INITIAL PREVENT EXAM: HCPCS | Performed by: FAMILY MEDICINE

## 2019-01-24 PROCEDURE — 99214 OFFICE O/P EST MOD 30 MIN: CPT | Performed by: FAMILY MEDICINE

## 2019-01-24 PROCEDURE — G0402 INITIAL PREVENTIVE EXAM: HCPCS | Performed by: FAMILY MEDICINE

## 2019-01-24 NOTE — PATIENT INSTRUCTIONS
[id number] [facility] SCREENING SCHEDULE   Tests on this list are recommended by your physician but may not be covered, or covered at this frequency, by your insurer. Please check with your insurance carrier before scheduling to verify coverage.     PREVEN Electrocardiogram date Routine EKG is not a screening covered service except at the [place] to Medicare Visit    Abdominal aortic aneurysm screening (once between ages 73-68)  No results found for this or any previous visit.  Limited to patients who meet on Pneumococcal 13 (Prevnar)  Covered Once after 65 Orders placed or performed in visit on 05/23/18   • PNEUMOCOCCAL VACC, 13 KELSEY IM    Please get once after your 65th birthday    Pneumococcal 23 (Pneumovax)  Covered Once after 65 No orders found for this or available in [place]  www. putitinwriting. org  This link also has information from the [facility] regarding Advance Directives.

## 2019-01-24 NOTE — PROGRESS NOTES
HPI:   Florida Abraham is a 70year old male who presents for a Medicare Initial Annual Wellness visit (Once after 12 month Medicare anniversary) . Pt. Is here for AWV. He is also here for a med check.      Florida Abraham is a 70year old male weeks)?: Not at all  PHQ-2 SCORE: 0     Advanced Directive:   He does have a Living Will but we do NOT have it on file in [address] [place] [address] Rd.    The patient has this document but we do not have it in [facility], and patient is instructed to get our office a copy of it for scan colon     Anal fissure     Prolapsed internal hemorrhoids, grade 3     External prolapsed hemorrhoids    Wt Readings from Last 3 Encounters:  01/24/19 : 257 lb  12/10/18 : 258 lb  11/08/18 : 257 lb     Last Cholesterol Labs:   Lab Results   Component Value daily.   Insulin Pen Needle (BD PEN NEEDLE SHORT U/F) 31G X 8 MM Does not apply Misc Please disregard previous qty on this. Pt uses up to 4x a day.  Pt uses 360 a month~   Insulin Pen Needle (BD PEN NEEDLE SHORT U/F) 31G X 8 MM Does not apply Misc Use with nasal congestion, sinus pain or ST  LUNGS: denies shortness of breath with exertion  CARDIOVASCULAR: denies chest pain on exertion  GI: denies abdominal pain, denies heartburn  : 2 per night nocturia, no complaint of urinary incontinence  MUSCULOSKELETAL organomegaly   Genitalia: Jamison Jones, RN present; Normal male   Rectal: DEFERRED   Extremities: Extremities normal, atraumatic, no cyanosis or edema   Pulses: 2+ and symmetric   Skin: Skin color, texture, turgor normal, no rashes or lesions   Lymph nodes: Ce COMP METABOLIC PANEL (14); Future  -     HEMOGLOBIN A1C; Future  -     MICROALB/CREAT RATIO, RANDOM URINE; Future  -     MMR PANEL ( COLLEGE IMMUNITY PANEL); Future    Diabetes mellitus type 2 in obese (HCC)  -     COMP METABOLIC PANEL (14);  Future  - ordered. - HCV ANTIBODY; Future    3. Body mass index (BMI) of 36.0-36.9 in adult  Advised to lose weight.  - BEHAVIOR  OBESITY 15M    4.  Type 2 diabetes mellitus with diabetic neuropathy, with long-term current use of insulin [facility])  Patient stat unspecified  Stable; CPM  - PSA TOTAL W REFLEX TO FREE; Future    19. Pruritus ani  Stable; CPM and seeing Dr. Mickael Schwab at this time.     20. Diverticulosis of colon  Stable; CPM; patient is aware he is due for colonoscopy this year with Dr. Alea Palomo but is cur Fasting Blood Sugar (FSB)Annually Glucose (mg/dL)   Date Value   01/18/2019 196 (H)     GLUCOSE (mg/dL)   Date Value   07/03/2014 145 (H)   02/13/2012 220 (H)       Cardiovascular Disease Screening     LDL Annually LDL-CHOLESTEROL (mg/dL (calc))   Date benefits, but Medicare does not cover unless Medically needed    Zoster   Not covered by Medicare Part B No vaccine history found This may be covered with your pharmacy  prescription benefits      SPECIFIC DISEASE MONITORING Internal Lab or Procedure Exter counseling and behavioral therapy to promote sustained weight loss through high intensity interventions on diet and exercise. ASSESSMENT AND PLAN:   Based on the 5A’s approach adopted by the USPSTF, the following plan is arranged:    Assess:  We asked a

## 2019-02-27 ENCOUNTER — PRIOR ORIGINAL RECORDS (OUTPATIENT)
Dept: OTHER | Age: 64
End: 2019-02-27

## 2019-02-27 LAB
ALBUMIN: 3.9 G/DL
ALKALINE PHOSPHATATE(ALK PHOS): 88 IU/L
BILIRUBIN TOTAL: 0.7 MG/DL
BUN: 25 MG/DL
CALCIUM: 9.3 MG/DL
CHLORIDE: 105 MEQ/L
CHOLESTEROL, TOTAL: 117 MG/DL
CREATININE, SERUM: 1.08 MG/DL
GLOBULIN: 3.7 G/DL
GLUCOSE: 196 MG/DL
HDL CHOLESTEROL: 37 MG/DL
HEMOGLOBIN A1C: 7.6 %
LDL CHOLESTEROL: 55 MG/DL
POTASSIUM, SERUM: 3.9 MEQ/L
PROTEIN, TOTAL: 7.6 G/DL
SGOT (AST): 14 IU/L
SGPT (ALT): 33 IU/L
SODIUM: 141 MEQ/L
TRIGLYCERIDES: 124 MG/DL

## 2019-02-28 VITALS
HEART RATE: 84 BPM | WEIGHT: 259 LBS | SYSTOLIC BLOOD PRESSURE: 112 MMHG | DIASTOLIC BLOOD PRESSURE: 70 MMHG | BODY MASS INDEX: 37.08 KG/M2 | HEIGHT: 70 IN

## 2019-02-28 VITALS
BODY MASS INDEX: 36.79 KG/M2 | HEART RATE: 92 BPM | WEIGHT: 257 LBS | DIASTOLIC BLOOD PRESSURE: 64 MMHG | SYSTOLIC BLOOD PRESSURE: 132 MMHG | HEIGHT: 70 IN

## 2019-03-07 VITALS
BODY MASS INDEX: 36.94 KG/M2 | DIASTOLIC BLOOD PRESSURE: 70 MMHG | HEIGHT: 70 IN | HEART RATE: 84 BPM | WEIGHT: 258 LBS | SYSTOLIC BLOOD PRESSURE: 132 MMHG

## 2019-03-21 RX ORDER — VALSARTAN AND HYDROCHLOROTHIAZIDE 160; 25 MG/1; MG/1
TABLET ORAL
Qty: 90 TABLET | Refills: 0 | Status: SHIPPED | OUTPATIENT
Start: 2019-03-21 | End: 2019-06-14

## 2019-03-21 RX ORDER — TAMSULOSIN HYDROCHLORIDE 0.4 MG/1
CAPSULE ORAL
Qty: 90 CAPSULE | Refills: 0 | Status: SHIPPED | OUTPATIENT
Start: 2019-03-21 | End: 2019-04-23 | Stop reason: DRUGHIGH

## 2019-03-22 RX ORDER — GLYBURIDE 5 MG/1
TABLET ORAL
Qty: 360 TABLET | Refills: 1 | Status: SHIPPED | OUTPATIENT
Start: 2019-03-22 | End: 2019-09-23

## 2019-03-28 ENCOUNTER — OFFICE VISIT (OUTPATIENT)
Dept: SURGERY | Facility: CLINIC | Age: 64
End: 2019-03-28

## 2019-03-28 VITALS
HEART RATE: 90 BPM | DIASTOLIC BLOOD PRESSURE: 74 MMHG | BODY MASS INDEX: 36.94 KG/M2 | HEIGHT: 70 IN | WEIGHT: 258 LBS | SYSTOLIC BLOOD PRESSURE: 128 MMHG

## 2019-03-28 DIAGNOSIS — E11.40 TYPE 2 DIABETES MELLITUS WITH DIABETIC NEUROPATHY, WITH LONG-TERM CURRENT USE OF INSULIN (HCC): ICD-10-CM

## 2019-03-28 DIAGNOSIS — I10 ESSENTIAL HYPERTENSION: ICD-10-CM

## 2019-03-28 DIAGNOSIS — I35.9 AORTIC VALVE CALCIFICATION: ICD-10-CM

## 2019-03-28 DIAGNOSIS — I35.0 MILD AORTIC STENOSIS: ICD-10-CM

## 2019-03-28 DIAGNOSIS — K64.2 PROLAPSED INTERNAL HEMORRHOIDS, GRADE 3: Primary | ICD-10-CM

## 2019-03-28 DIAGNOSIS — L29.0 PRURITUS ANI: ICD-10-CM

## 2019-03-28 DIAGNOSIS — Z79.4 TYPE 2 DIABETES MELLITUS WITH DIABETIC NEUROPATHY, WITH LONG-TERM CURRENT USE OF INSULIN (HCC): ICD-10-CM

## 2019-03-28 PROCEDURE — 46221 LIGATION OF HEMORRHOID(S): CPT | Performed by: COLON & RECTAL SURGERY

## 2019-03-28 PROCEDURE — 99213 OFFICE O/P EST LOW 20 MIN: CPT | Performed by: COLON & RECTAL SURGERY

## 2019-03-28 NOTE — PROGRESS NOTES
Follow Up Visit Note       Active Problems      1. Prolapsed internal hemorrhoids, grade 3    2. Pruritus ani    3. Aortic valve calcification    4. Mild aortic stenosis    5. Essential hypertension    6.  Type 2 diabetes mellitus with diabetic neuropathy, and developed an assessment and plan. The physician performed all medical decision making. SUZIE Balderas    I agree with the note as scribed by the PA  I performed my own physical exam and obtained the history as detailed above.   I have made all Onset   • Other (CHF[other]) Mother    • Diabetes Mother         Type II   • Diabetes Father         Type II   • Other (Other[other]) Father    • Stroke Father    • Other (Amputation[other]) Brother         leg below knee   • Cancer Sister         colon (18 mcg total) into the lungs daily. , Disp: 90 capsule, Rfl: 1  •  Insulin Aspart Pen (NOVOLOG FLEXPEN) 100 UNIT/ML Subcutaneous Solution Pen-injector, INJECT 20 UNITS SUBCUTANEOU SLY 3 TIMES DAILY BEFORE MEALS, Disp: 10 pen, Rfl: 2  •  insulin detemir (LE Negative for color change and rash. Neurological: Negative for tremors, syncope and weakness. Hematological: Negative for adenopathy. Does not bruise/bleed easily. Psychiatric/Behavioral: Negative for behavioral problems and sleep disturbance. external hemorrhoids. No anal fissure or fistulae are present. Digital rectal exam is performed revealing a normal prostate which is symmetric and without nodularity. Anoscopy is performed. Please see separate procedure note.   Multiple prolapsing grade fecal deposits outside the anus after a bowel movement. He has a difficult time keeping clean. The patient denies any blood in the stool. He denies any abdominal cramping or lack of appetite. No issues with constipation.     The patient has been mostl

## 2019-03-28 NOTE — PATIENT INSTRUCTIONS
The patient is a pleasant 75-year-old male who presents for further evaluation and treatment of anal pain and discomfort. I first met this patient in November 2018. At that time he had an anal fissure as well as internal and external hemorrhoids.   His brand of low acid coffee. If the patient continues to consume coffee, we would recommend that he adhere to the low acid brand, as this will help to minimize anal leakage and burning. The patient takes a daily aspirin.   I recommended that he stop taking

## 2019-03-29 NOTE — PROCEDURES
Pre op diagnosis: Internal Hemorrhoids    Post op diagnosis: Same    Procedure: Anoscopy with O-ring Rubber Band Ligation of Internal Hemorrhoids    Surgeon: Caleb George MD    History of present illness:    This patient has internal hemorrhoids that are s

## 2019-04-11 ENCOUNTER — OFFICE VISIT (OUTPATIENT)
Dept: SURGERY | Facility: CLINIC | Age: 64
End: 2019-04-11
Payer: MEDICARE

## 2019-04-11 VITALS
SYSTOLIC BLOOD PRESSURE: 103 MMHG | HEART RATE: 96 BPM | TEMPERATURE: 98 F | DIASTOLIC BLOOD PRESSURE: 67 MMHG | WEIGHT: 257 LBS | BODY MASS INDEX: 37 KG/M2

## 2019-04-11 DIAGNOSIS — L29.0 PRURITUS ANI: ICD-10-CM

## 2019-04-11 DIAGNOSIS — I10 ESSENTIAL HYPERTENSION: ICD-10-CM

## 2019-04-11 DIAGNOSIS — K57.30 DIVERTICULOSIS OF COLON: ICD-10-CM

## 2019-04-11 DIAGNOSIS — E11.40 TYPE 2 DIABETES MELLITUS WITH DIABETIC NEUROPATHY, WITH LONG-TERM CURRENT USE OF INSULIN (HCC): ICD-10-CM

## 2019-04-11 DIAGNOSIS — K64.2 PROLAPSED INTERNAL HEMORRHOIDS, GRADE 3: Primary | ICD-10-CM

## 2019-04-11 DIAGNOSIS — Z79.4 TYPE 2 DIABETES MELLITUS WITH DIABETIC NEUROPATHY, WITH LONG-TERM CURRENT USE OF INSULIN (HCC): ICD-10-CM

## 2019-04-11 DIAGNOSIS — J44.9 CHRONIC OBSTRUCTIVE PULMONARY DISEASE, UNSPECIFIED COPD TYPE (HCC): ICD-10-CM

## 2019-04-11 PROCEDURE — 46221 LIGATION OF HEMORRHOID(S): CPT | Performed by: COLON & RECTAL SURGERY

## 2019-04-11 NOTE — PROGRESS NOTES
Follow Up Visit Note       Active Problems      1. Prolapsed internal hemorrhoids, grade 3    2. Pruritus ani    3. Type 2 diabetes mellitus with diabetic neuropathy, with long-term current use of insulin ([address]    4. Essential hypertension    5.  Diverticul Type II   • Diabetes Father         Type II   • Other (Other[other]) Father    • Stroke Father    • Other (Amputation[other]) Brother         leg below knee   • Cancer Sister         colon   • Diabetes Brother         Type II   • Other (Diabetic retinopath 20 UNITS SUBCUTANEOU SLY 3 TIMES DAILY BEFORE MEALS, Disp: 10 pen, Rfl: 2  •  insulin detemir (LEVEMIR FLEXTOUCH) 100 UNIT/ML Subcutaneous Solution Pen-injector, INJECT 30 UNITS UNDER THE SKIN BID, Disp: 10 pen, Rfl: 2  •  OMEPRAZOLE OR, Take by mouth 2 (t Negative for tremors, syncope and weakness. Hematological: Negative for adenopathy. Does not bruise/bleed easily. Psychiatric/Behavioral: Negative for behavioral problems and sleep disturbance. Physical Findings   /67   Pulse 96   Temp 97.

## 2019-04-18 RX ORDER — OMEGA-3-ACID ETHYL ESTERS 1 G/1
CAPSULE, LIQUID FILLED ORAL
Qty: 360 CAPSULE | Refills: 1 | Status: SHIPPED | OUTPATIENT
Start: 2019-04-18 | End: 2019-11-12

## 2019-04-19 ENCOUNTER — LAB ENCOUNTER (OUTPATIENT)
Dept: LAB | Age: 64
End: 2019-04-19
Attending: FAMILY MEDICINE
Payer: MEDICARE

## 2019-04-19 DIAGNOSIS — E11.69 DIABETES MELLITUS TYPE 2 IN OBESE (HCC): ICD-10-CM

## 2019-04-19 DIAGNOSIS — Z79.4 TYPE 2 DIABETES MELLITUS WITH DIABETIC NEUROPATHY, WITH LONG-TERM CURRENT USE OF INSULIN (HCC): ICD-10-CM

## 2019-04-19 DIAGNOSIS — E55.9 VITAMIN D DEFICIENCY: ICD-10-CM

## 2019-04-19 DIAGNOSIS — Z11.59 NEED FOR HEPATITIS C SCREENING TEST: ICD-10-CM

## 2019-04-19 DIAGNOSIS — J44.9 CHRONIC OBSTRUCTIVE PULMONARY DISEASE, UNSPECIFIED COPD TYPE (HCC): ICD-10-CM

## 2019-04-19 DIAGNOSIS — Z13.0 SCREENING FOR DEFICIENCY ANEMIA: ICD-10-CM

## 2019-04-19 DIAGNOSIS — E66.9 DIABETES MELLITUS TYPE 2 IN OBESE (HCC): ICD-10-CM

## 2019-04-19 DIAGNOSIS — E11.40 TYPE 2 DIABETES MELLITUS WITH DIABETIC NEUROPATHY, WITH LONG-TERM CURRENT USE OF INSULIN (HCC): ICD-10-CM

## 2019-04-19 DIAGNOSIS — K22.719 BARRETT'S ESOPHAGUS WITH DYSPLASIA: ICD-10-CM

## 2019-04-19 DIAGNOSIS — Z87.39 HISTORY OF GOUT: ICD-10-CM

## 2019-04-19 DIAGNOSIS — E78.5 DYSLIPIDEMIA: ICD-10-CM

## 2019-04-19 DIAGNOSIS — N40.1 BENIGN PROSTATIC HYPERPLASIA WITH LOWER URINARY TRACT SYMPTOMS, SYMPTOM DETAILS UNSPECIFIED: ICD-10-CM

## 2019-04-19 PROCEDURE — 80061 LIPID PANEL: CPT

## 2019-04-19 PROCEDURE — 82570 ASSAY OF URINE CREATININE: CPT

## 2019-04-19 PROCEDURE — 36415 COLL VENOUS BLD VENIPUNCTURE: CPT

## 2019-04-19 PROCEDURE — 83036 HEMOGLOBIN GLYCOSYLATED A1C: CPT

## 2019-04-19 PROCEDURE — 84443 ASSAY THYROID STIM HORMONE: CPT

## 2019-04-19 PROCEDURE — 86735 MUMPS ANTIBODY: CPT

## 2019-04-19 PROCEDURE — 86762 RUBELLA ANTIBODY: CPT

## 2019-04-19 PROCEDURE — 84550 ASSAY OF BLOOD/URIC ACID: CPT

## 2019-04-19 PROCEDURE — 84154 ASSAY OF PSA FREE: CPT

## 2019-04-19 PROCEDURE — 85025 COMPLETE CBC W/AUTO DIFF WBC: CPT

## 2019-04-19 PROCEDURE — 82306 VITAMIN D 25 HYDROXY: CPT

## 2019-04-19 PROCEDURE — 86765 RUBEOLA ANTIBODY: CPT

## 2019-04-19 PROCEDURE — 84153 ASSAY OF PSA TOTAL: CPT

## 2019-04-19 PROCEDURE — 80053 COMPREHEN METABOLIC PANEL: CPT

## 2019-04-19 PROCEDURE — 82043 UR ALBUMIN QUANTITATIVE: CPT

## 2019-04-19 PROCEDURE — 86803 HEPATITIS C AB TEST: CPT

## 2019-04-23 ENCOUNTER — OFFICE VISIT (OUTPATIENT)
Dept: FAMILY MEDICINE CLINIC | Facility: CLINIC | Age: 64
End: 2019-04-23
Payer: MEDICARE

## 2019-04-23 VITALS
BODY MASS INDEX: 36.51 KG/M2 | RESPIRATION RATE: 14 BRPM | DIASTOLIC BLOOD PRESSURE: 62 MMHG | SYSTOLIC BLOOD PRESSURE: 120 MMHG | WEIGHT: 255 LBS | HEIGHT: 70 IN | HEART RATE: 84 BPM

## 2019-04-23 DIAGNOSIS — Z87.39 HISTORY OF GOUT: ICD-10-CM

## 2019-04-23 DIAGNOSIS — K22.719 BARRETT'S ESOPHAGUS WITH DYSPLASIA: ICD-10-CM

## 2019-04-23 DIAGNOSIS — E78.5 DYSLIPIDEMIA: ICD-10-CM

## 2019-04-23 DIAGNOSIS — E66.09 CLASS 2 OBESITY DUE TO EXCESS CALORIES WITH BODY MASS INDEX (BMI) OF 35.0 TO 35.9 IN ADULT, UNSPECIFIED WHETHER SERIOUS COMORBIDITY PRESENT: ICD-10-CM

## 2019-04-23 DIAGNOSIS — I35.0 MILD AORTIC STENOSIS: ICD-10-CM

## 2019-04-23 DIAGNOSIS — Z79.4 TYPE 2 DIABETES MELLITUS WITH DIABETIC NEUROPATHY, WITH LONG-TERM CURRENT USE OF INSULIN (HCC): Primary | ICD-10-CM

## 2019-04-23 DIAGNOSIS — I35.9 AORTIC VALVE CALCIFICATION: ICD-10-CM

## 2019-04-23 DIAGNOSIS — R97.20 ELEVATED PSA: ICD-10-CM

## 2019-04-23 DIAGNOSIS — J44.9 CHRONIC OBSTRUCTIVE PULMONARY DISEASE, UNSPECIFIED COPD TYPE (HCC): ICD-10-CM

## 2019-04-23 DIAGNOSIS — E55.9 VITAMIN D DEFICIENCY: ICD-10-CM

## 2019-04-23 DIAGNOSIS — N40.1 BENIGN PROSTATIC HYPERPLASIA WITH LOWER URINARY TRACT SYMPTOMS, SYMPTOM DETAILS UNSPECIFIED: ICD-10-CM

## 2019-04-23 DIAGNOSIS — F17.200 SMOKING: ICD-10-CM

## 2019-04-23 DIAGNOSIS — I10 ESSENTIAL HYPERTENSION: ICD-10-CM

## 2019-04-23 DIAGNOSIS — I05.9 MITRAL VALVE ANNULAR CALCIFICATION: ICD-10-CM

## 2019-04-23 DIAGNOSIS — K64.4 EXTERNAL HEMORRHOID: ICD-10-CM

## 2019-04-23 DIAGNOSIS — K64.2 PROLAPSED INTERNAL HEMORRHOIDS, GRADE 3: ICD-10-CM

## 2019-04-23 DIAGNOSIS — E11.40 TYPE 2 DIABETES MELLITUS WITH DIABETIC NEUROPATHY, WITH LONG-TERM CURRENT USE OF INSULIN (HCC): Primary | ICD-10-CM

## 2019-04-23 PROCEDURE — 99214 OFFICE O/P EST MOD 30 MIN: CPT | Performed by: FAMILY MEDICINE

## 2019-04-23 RX ORDER — TAMSULOSIN HYDROCHLORIDE 0.4 MG/1
0.8 CAPSULE ORAL DAILY
Qty: 180 CAPSULE | Refills: 0 | Status: SHIPPED | OUTPATIENT
Start: 2019-04-23 | End: 2019-07-26

## 2019-04-23 NOTE — PROGRESS NOTES
HPI:   Mindy Langston is a 70year old male who presents for recheck of his diabetes, hypertension and dyslipidemia. Patient has not been checking his FBS's at home regularly. Last eye exam -- 9 months ago.   Patient presents for recheck of his hypert 1.0 - 2.0   LIPID PANEL   Result Value Ref Range    Cholesterol, Total 126 <200 mg/dL    HDL Cholesterol 42 40 - 59 mg/dL    Triglycerides 112 30 - 149 mg/dL    LDL Cholesterol 62 <100 mg/dL    VLDL 22 0 - 30 mg/dL    Non HDL Chol 84 <130 mg/dL   VITAMIN D Absolute 0.04 0.00 - 1.00 x10(3) uL    Neutrophil % 63.6 %    Lymphocyte % 27.4 %    Monocyte % 6.2 %    Eosinophil % 1.7 %    Basophil % 0.6 %    Immature Granulocyte % 0.5 %         Current Outpatient Medications:  tamsulosin HCl (FLOMAX) 0.4 MG Oral Cap Disp:  Rfl:    Aspirin 81 MG Oral Chew Tab Chew 81 mg by mouth daily. Disp:  Rfl:    Multiple Vitamins-Minerals (CENTRUM SILVER OR) Take  by mouth.  Disp:  Rfl:         Dander                      Comment:dog  Penicillins             DIARRHEA  Tetanus Toxoi eye pain  RESPIRATORY: denies shortness of breath with exertion  CARDIOVASCULAR: denies chest pain on exertion  GI: denies abdominal pain and denies nausea or vomitting  NEURO: denies headaches, dizziness, numbness or tingling  MUSCULOSKELETAL: denies any stable  Aortic stenosis -- stable  Hx of gout -- Uric acid is stable. HTN -- stable, continue monitoring BP at home regularly. COPD -- stable on spiriva  DYSLIPIDEMIA-- Continue Vytorin. Continue working on diet and exercise.   GERD -- stable  Hemorrhoids

## 2019-04-24 RX ORDER — INSULIN ASPART 100 [IU]/ML
INJECTION, SOLUTION INTRAVENOUS; SUBCUTANEOUS
COMMUNITY

## 2019-04-24 RX ORDER — OMEPRAZOLE 20 MG/1
CAPSULE, DELAYED RELEASE ORAL
COMMUNITY

## 2019-04-24 RX ORDER — VALSARTAN AND HYDROCHLOROTHIAZIDE 160; 25 MG/1; MG/1
TABLET ORAL
COMMUNITY
End: 2021-03-10 | Stop reason: ALTCHOICE

## 2019-04-24 RX ORDER — GLYBURIDE 5 MG/1
TABLET ORAL
COMMUNITY
End: 2021-03-10 | Stop reason: ALTCHOICE

## 2019-04-24 RX ORDER — TAMSULOSIN HYDROCHLORIDE 0.4 MG/1
CAPSULE ORAL 2 TIMES DAILY
COMMUNITY

## 2019-04-24 RX ORDER — EZETIMIBE AND SIMVASTATIN 10; 40 MG/1; MG/1
TABLET ORAL
COMMUNITY

## 2019-04-24 RX ORDER — NIACIN 500 MG
TABLET ORAL 2 TIMES DAILY
COMMUNITY

## 2019-04-24 RX ORDER — OMEGA-3 FATTY ACIDS/FISH OIL 300-1000MG
CAPSULE ORAL
COMMUNITY

## 2019-04-25 ENCOUNTER — OFFICE VISIT (OUTPATIENT)
Dept: SURGERY | Facility: CLINIC | Age: 64
End: 2019-04-25
Payer: MEDICARE

## 2019-04-25 VITALS
HEIGHT: 70 IN | WEIGHT: 258 LBS | DIASTOLIC BLOOD PRESSURE: 70 MMHG | SYSTOLIC BLOOD PRESSURE: 126 MMHG | BODY MASS INDEX: 36.94 KG/M2 | HEART RATE: 86 BPM | TEMPERATURE: 98 F

## 2019-04-25 DIAGNOSIS — K64.2 PROLAPSED INTERNAL HEMORRHOIDS, GRADE 3: Primary | ICD-10-CM

## 2019-04-25 PROCEDURE — 46221 LIGATION OF HEMORRHOID(S): CPT | Performed by: COLON & RECTAL SURGERY

## 2019-04-25 NOTE — PROGRESS NOTES
Follow Up Visit Note       Active Problems      1. Prolapsed internal hemorrhoids, grade 3          Chief Complaint   Patient presents with:  Hemorrhoid Banding: 3rd banding. No problems with the first 2 bandings. Denies any current pain or bleeding. • Other (Diabetic retinopathy[other]) Brother    • Cancer Sister         liver   • Cancer Sister         ovarian     Social History    Socioeconomic History      Marital status:       Spouse name: Not on file      Number of children: Not on file INJECT 20 UNITS SUBCUTANEOU SLY 3 TIMES DAILY BEFORE MEALS, Disp: 10 pen, Rfl: 2  •  insulin detemir (LEVEMIR FLEXTOUCH) 100 UNIT/ML Subcutaneous Solution Pen-injector, INJECT 30 UNITS UNDER THE SKIN BID, Disp: 10 pen, Rfl: 2  •  OMEPRAZOLE OR, Take by julia 126/70   Pulse 86   Temp 98.3 °F (36.8 °C)   Ht 70\"   Wt 258 lb   BMI 37.02 kg/m²   Physical Exam     Assessment   Prolapsed internal hemorrhoids, grade 3  (primary encounter diagnosis)    Plan   At today's office visit we treated a right posterior latera

## 2019-04-25 NOTE — PROCEDURES
Pre op diagnosis: Internal Hemorrhoids    Post op diagnosis: Same    Procedure: Anoscopy with O-ring Rubber Band Ligation of Internal Hemorrhoids    Surgeon: Bhavani Singh MD    History of present illness:    This patient has internal hemorrhoids that are s

## 2019-04-26 PROBLEM — K60.2 ANAL FISSURE: Status: RESOLVED | Noted: 2018-11-09 | Resolved: 2019-04-26

## 2019-04-26 NOTE — PROCEDURES
Pre op diagnosis: Internal Hemorrhoids    Post op diagnosis: Same    Procedure: Anoscopy with O-ring Rubber Band Ligation of Internal Hemorrhoids    Surgeon: Dmitry Gale MD    History of present illness:    This patient has internal hemorrhoids that are s

## 2019-05-20 ENCOUNTER — OFFICE VISIT (OUTPATIENT)
Dept: SURGERY | Facility: CLINIC | Age: 64
End: 2019-05-20
Payer: MEDICARE

## 2019-05-20 VITALS
WEIGHT: 250 LBS | HEART RATE: 85 BPM | TEMPERATURE: 98 F | DIASTOLIC BLOOD PRESSURE: 64 MMHG | HEIGHT: 70 IN | BODY MASS INDEX: 35.79 KG/M2 | SYSTOLIC BLOOD PRESSURE: 106 MMHG

## 2019-05-20 DIAGNOSIS — K64.2 PROLAPSED INTERNAL HEMORRHOIDS, GRADE 3: Primary | ICD-10-CM

## 2019-05-20 PROCEDURE — 46221 LIGATION OF HEMORRHOID(S): CPT | Performed by: COLON & RECTAL SURGERY

## 2019-05-20 NOTE — PROGRESS NOTES
Follow Up Visit Note       Active Problems      1.  Prolapsed internal hemorrhoids, grade 3          Chief Complaint   Patient presents with:  Anal / Rectal Problem: 4TH BANDING        History of Present Illness        Allergies  Santos Canas is allergic to dande liver   • Cancer Sister         ovarian     Social History    Socioeconomic History      Marital status:       Spouse name: Not on file      Number of children: Not on file      Years of education: Not on file      Highest education level: Not on fi 2  •  insulin detemir (LEVEMIR FLEXTOUCH) 100 UNIT/ML Subcutaneous Solution Pen-injector, INJECT 30 UNITS UNDER THE SKIN BID, Disp: 10 pen, Rfl: 2  •  OMEPRAZOLE OR, Take by mouth 2 (two) times daily. , Disp: , Rfl:   •  Insulin Pen Needle (BD PEN NEEDLE SH 35.87 kg/m²   Physical Exam     Assessment   Prolapsed internal hemorrhoids, grade 3  (primary encounter diagnosis)    Plan   At today's office visit I treated a left posterior lateral internal hemorrhoid.     At today's visit, the patient underwent an unco

## 2019-05-27 NOTE — PATIENT INSTRUCTIONS
At today's office visit I treated a left posterior lateral internal hemorrhoid. At today's visit, the patient underwent an uncomplicated application of a rubber band and injection of a 5% phenol solution into the base of the rubberbanded hemorrhoid.

## 2019-05-27 NOTE — PROCEDURES
Pre op diagnosis: Internal Hemorrhoids    Post op diagnosis: Same    Procedure: Anoscopy with O-ring Rubber Band Ligation of Internal Hemorrhoids    Surgeon: Rupinder Mckeon MD    History of present illness:    This patient has internal hemorrhoids that are s

## 2019-06-14 RX ORDER — VALSARTAN AND HYDROCHLOROTHIAZIDE 160; 25 MG/1; MG/1
TABLET ORAL
Qty: 90 TABLET | Refills: 0 | Status: SHIPPED | OUTPATIENT
Start: 2019-06-14 | End: 2019-09-11

## 2019-06-19 RX ORDER — TAMSULOSIN HYDROCHLORIDE 0.4 MG/1
0.8 CAPSULE ORAL DAILY
Qty: 180 CAPSULE | Refills: 0 | Status: SHIPPED | OUTPATIENT
Start: 2019-06-19 | End: 2020-02-26

## 2019-07-18 ENCOUNTER — LAB ENCOUNTER (OUTPATIENT)
Dept: LAB | Age: 64
End: 2019-07-18
Attending: FAMILY MEDICINE
Payer: MEDICARE

## 2019-07-18 DIAGNOSIS — Z79.4 TYPE 2 DIABETES MELLITUS WITH DIABETIC NEUROPATHY, WITH LONG-TERM CURRENT USE OF INSULIN (HCC): ICD-10-CM

## 2019-07-18 DIAGNOSIS — R97.20 ELEVATED PSA: ICD-10-CM

## 2019-07-18 DIAGNOSIS — E11.40 TYPE 2 DIABETES MELLITUS WITH DIABETIC NEUROPATHY, WITH LONG-TERM CURRENT USE OF INSULIN (HCC): ICD-10-CM

## 2019-07-18 DIAGNOSIS — E78.5 DYSLIPIDEMIA: ICD-10-CM

## 2019-07-18 LAB
ALBUMIN SERPL-MCNC: 3.9 G/DL (ref 3.4–5)
ALBUMIN/GLOB SERPL: 1 {RATIO} (ref 1–2)
ALP LIVER SERPL-CCNC: 84 U/L (ref 45–117)
ALT SERPL-CCNC: 33 U/L (ref 16–61)
ANION GAP SERPL CALC-SCNC: 4 MMOL/L (ref 0–18)
AST SERPL-CCNC: 18 U/L (ref 15–37)
BILIRUB SERPL-MCNC: 0.5 MG/DL (ref 0.1–2)
BUN BLD-MCNC: 31 MG/DL (ref 7–18)
BUN/CREAT SERPL: 26.3 (ref 10–20)
CALCIUM BLD-MCNC: 9.1 MG/DL (ref 8.5–10.1)
CHLORIDE SERPL-SCNC: 104 MMOL/L (ref 98–112)
CHOLEST SMN-MCNC: 124 MG/DL (ref ?–200)
CO2 SERPL-SCNC: 28 MMOL/L (ref 21–32)
CREAT BLD-MCNC: 1.18 MG/DL (ref 0.7–1.3)
CREAT UR-SCNC: 72.5 MG/DL
EST. AVERAGE GLUCOSE BLD GHB EST-MCNC: 148 MG/DL (ref 68–126)
GLOBULIN PLAS-MCNC: 3.8 G/DL (ref 2.8–4.4)
GLUCOSE BLD-MCNC: 155 MG/DL (ref 70–99)
HBA1C MFR BLD HPLC: 6.8 % (ref ?–5.7)
HDLC SERPL-MCNC: 41 MG/DL (ref 40–59)
LDLC SERPL CALC-MCNC: 52 MG/DL (ref ?–100)
M PROTEIN MFR SERPL ELPH: 7.7 G/DL (ref 6.4–8.2)
MICROALBUMIN UR-MCNC: 3.26 MG/DL
MICROALBUMIN/CREAT 24H UR-RTO: 45 UG/MG (ref ?–30)
NONHDLC SERPL-MCNC: 83 MG/DL (ref ?–130)
OSMOLALITY SERPL CALC.SUM OF ELEC: 292 MOSM/KG (ref 275–295)
POTASSIUM SERPL-SCNC: 4.4 MMOL/L (ref 3.5–5.1)
PSA SERPL-MCNC: 3.14 NG/ML (ref ?–4)
SODIUM SERPL-SCNC: 136 MMOL/L (ref 136–145)
TRIGL SERPL-MCNC: 156 MG/DL (ref 30–149)
VLDLC SERPL CALC-MCNC: 31 MG/DL (ref 0–30)

## 2019-07-18 PROCEDURE — 80053 COMPREHEN METABOLIC PANEL: CPT

## 2019-07-18 PROCEDURE — 36415 COLL VENOUS BLD VENIPUNCTURE: CPT

## 2019-07-18 PROCEDURE — 83036 HEMOGLOBIN GLYCOSYLATED A1C: CPT

## 2019-07-18 PROCEDURE — 82043 UR ALBUMIN QUANTITATIVE: CPT

## 2019-07-18 PROCEDURE — 82570 ASSAY OF URINE CREATININE: CPT

## 2019-07-18 PROCEDURE — 84153 ASSAY OF PSA TOTAL: CPT

## 2019-07-18 PROCEDURE — 80061 LIPID PANEL: CPT

## 2019-07-23 ENCOUNTER — OFFICE VISIT (OUTPATIENT)
Dept: FAMILY MEDICINE CLINIC | Facility: CLINIC | Age: 64
End: 2019-07-23
Payer: MEDICARE

## 2019-07-23 VITALS
HEART RATE: 98 BPM | OXYGEN SATURATION: 96 % | DIASTOLIC BLOOD PRESSURE: 78 MMHG | WEIGHT: 259 LBS | HEIGHT: 70 IN | BODY MASS INDEX: 37.08 KG/M2 | RESPIRATION RATE: 14 BRPM | SYSTOLIC BLOOD PRESSURE: 138 MMHG

## 2019-07-23 DIAGNOSIS — E11.40 TYPE 2 DIABETES MELLITUS WITH DIABETIC NEUROPATHY, WITH LONG-TERM CURRENT USE OF INSULIN (HCC): Primary | ICD-10-CM

## 2019-07-23 DIAGNOSIS — J44.9 CHRONIC OBSTRUCTIVE PULMONARY DISEASE, UNSPECIFIED COPD TYPE (HCC): ICD-10-CM

## 2019-07-23 DIAGNOSIS — K22.719 BARRETT'S ESOPHAGUS WITH DYSPLASIA: ICD-10-CM

## 2019-07-23 DIAGNOSIS — F17.200 SMOKING: ICD-10-CM

## 2019-07-23 DIAGNOSIS — Z79.4 TYPE 2 DIABETES MELLITUS WITH DIABETIC NEUROPATHY, WITH LONG-TERM CURRENT USE OF INSULIN (HCC): Primary | ICD-10-CM

## 2019-07-23 DIAGNOSIS — E66.9 DIABETES MELLITUS TYPE 2 IN OBESE (HCC): ICD-10-CM

## 2019-07-23 DIAGNOSIS — Z23 NEED FOR VACCINATION: ICD-10-CM

## 2019-07-23 DIAGNOSIS — E78.5 DYSLIPIDEMIA: ICD-10-CM

## 2019-07-23 DIAGNOSIS — Z71.85 VACCINE COUNSELING: ICD-10-CM

## 2019-07-23 DIAGNOSIS — I10 ESSENTIAL HYPERTENSION: ICD-10-CM

## 2019-07-23 DIAGNOSIS — E11.69 DIABETES MELLITUS TYPE 2 IN OBESE (HCC): ICD-10-CM

## 2019-07-23 PROCEDURE — 90732 PPSV23 VACC 2 YRS+ SUBQ/IM: CPT | Performed by: FAMILY MEDICINE

## 2019-07-23 PROCEDURE — G0009 ADMIN PNEUMOCOCCAL VACCINE: HCPCS | Performed by: FAMILY MEDICINE

## 2019-07-23 PROCEDURE — 99214 OFFICE O/P EST MOD 30 MIN: CPT | Performed by: FAMILY MEDICINE

## 2019-07-23 NOTE — PROGRESS NOTES
HPI:   Sybil Pantoja is a 75year old male who presents for recheck of his diabetes, hypertension and dyslipidemia. Patient has not been checking his FBS's at home regularly. Last eye exam -- 2 months ago.   Patient presents for recheck of his hypert g/dL    Globulin  3.8 2.8 - 4.4 g/dL    A/G Ratio 1.0 1.0 - 2.0   HEMOGLOBIN A1C   Result Value Ref Range    HgbA1C 6.8 (H) <5.7 %    Estimated Average Glucose 148 (H) 68 - 126 mg/dL   LIPID PANEL   Result Value Ref Range    Cholesterol, Total 124 <200 mg/ Take by mouth daily. Disp:  Rfl:    Insulin Pen Needle (BD PEN NEEDLE SHORT U/F) 31G X 8 MM Does not apply Misc Please disregard previous qty on this. Pt uses up to 4x a day.  Pt uses 360 a month~ Disp: 400 each Rfl: 1   Insulin Pen Needle (BD PEN NEEDLE Pack years: 30        Types: Cigarettes      Smokeless tobacco: Never Used      Tobacco comment: 1.0 pack weekends, only 4 cigs QD week days    Alcohol use: Yes      Alcohol/week: 0.0 standard drinks      Comment: social/ 0 in last 4 mon    Drug use:  No check fasting lipids, CMP in 3 months. Advised to continue to focus on healthier eating and exercise and weight loss. Discussed carbohydrate controlled diet. Continue to check feet daily.     Monitor FBS and BP readings at home regularly and log results

## 2019-07-24 RX ORDER — SITAGLIPTIN AND METFORMIN HYDROCHLORIDE 1000; 50 MG/1; MG/1
TABLET, FILM COATED, EXTENDED RELEASE ORAL
Qty: 180 TABLET | Refills: 1 | Status: SHIPPED | OUTPATIENT
Start: 2019-07-24 | End: 2020-01-03

## 2019-07-26 RX ORDER — TAMSULOSIN HYDROCHLORIDE 0.4 MG/1
CAPSULE ORAL
Qty: 180 CAPSULE | Refills: 0 | Status: SHIPPED | OUTPATIENT
Start: 2019-07-26 | End: 2019-10-17

## 2019-08-02 PROCEDURE — 88305 TISSUE EXAM BY PATHOLOGIST: CPT | Performed by: INTERNAL MEDICINE

## 2019-09-11 RX ORDER — VALSARTAN AND HYDROCHLOROTHIAZIDE 160; 25 MG/1; MG/1
TABLET ORAL
Qty: 60 TABLET | Refills: 0 | Status: SHIPPED | OUTPATIENT
Start: 2019-09-11 | End: 2019-11-11

## 2019-09-25 RX ORDER — GLYBURIDE 5 MG/1
TABLET ORAL
Qty: 360 TABLET | Refills: 1 | Status: SHIPPED | OUTPATIENT
Start: 2019-09-25 | End: 2020-03-11

## 2019-10-08 RX ORDER — EZETIMIBE AND SIMVASTATIN 10; 40 MG/1; MG/1
TABLET ORAL
Qty: 45 TABLET | Refills: 1 | Status: SHIPPED | OUTPATIENT
Start: 2019-10-08 | End: 2020-06-24

## 2019-10-08 RX ORDER — INSULIN DETEMIR 100 [IU]/ML
INJECTION, SOLUTION SUBCUTANEOUS
Qty: 10 PEN | Refills: 2 | Status: SHIPPED | OUTPATIENT
Start: 2019-10-08 | End: 2020-01-29

## 2019-10-08 NOTE — TELEPHONE ENCOUNTER
Not protocol medication. LOV : 7/23/19 med check diabetes   Last labs done :7/18/19  Next appointment : 10/17/19  Please see pending medication. Refill if appropriate.    Last refill:    Date:10/23/18  Amount : 10 pen 2 refills   Medication: levemir flext

## 2019-10-14 ENCOUNTER — APPOINTMENT (OUTPATIENT)
Dept: LAB | Age: 64
End: 2019-10-14
Attending: FAMILY MEDICINE
Payer: MEDICARE

## 2019-10-14 DIAGNOSIS — Z79.4 TYPE 2 DIABETES MELLITUS WITH DIABETIC NEUROPATHY, WITH LONG-TERM CURRENT USE OF INSULIN (HCC): ICD-10-CM

## 2019-10-14 DIAGNOSIS — E78.5 DYSLIPIDEMIA: ICD-10-CM

## 2019-10-14 DIAGNOSIS — E11.40 TYPE 2 DIABETES MELLITUS WITH DIABETIC NEUROPATHY, WITH LONG-TERM CURRENT USE OF INSULIN (HCC): ICD-10-CM

## 2019-10-14 PROCEDURE — 83036 HEMOGLOBIN GLYCOSYLATED A1C: CPT

## 2019-10-14 PROCEDURE — 80053 COMPREHEN METABOLIC PANEL: CPT

## 2019-10-14 PROCEDURE — 80061 LIPID PANEL: CPT

## 2019-10-14 PROCEDURE — 36415 COLL VENOUS BLD VENIPUNCTURE: CPT

## 2019-10-17 ENCOUNTER — MED REC SCAN ONLY (OUTPATIENT)
Dept: FAMILY MEDICINE CLINIC | Facility: CLINIC | Age: 64
End: 2019-10-17

## 2019-10-17 ENCOUNTER — OFFICE VISIT (OUTPATIENT)
Dept: FAMILY MEDICINE CLINIC | Facility: CLINIC | Age: 64
End: 2019-10-17
Payer: MEDICARE

## 2019-10-17 VITALS
BODY MASS INDEX: 37 KG/M2 | WEIGHT: 260 LBS | TEMPERATURE: 98 F | DIASTOLIC BLOOD PRESSURE: 70 MMHG | HEART RATE: 83 BPM | OXYGEN SATURATION: 97 % | SYSTOLIC BLOOD PRESSURE: 112 MMHG | RESPIRATION RATE: 24 BRPM

## 2019-10-17 DIAGNOSIS — I35.9 AORTIC VALVE CALCIFICATION: ICD-10-CM

## 2019-10-17 DIAGNOSIS — I05.9 MITRAL VALVE ANNULAR CALCIFICATION: ICD-10-CM

## 2019-10-17 DIAGNOSIS — E66.9 DIABETES MELLITUS TYPE 2 IN OBESE (HCC): ICD-10-CM

## 2019-10-17 DIAGNOSIS — E78.5 DYSLIPIDEMIA: ICD-10-CM

## 2019-10-17 DIAGNOSIS — F17.200 SMOKING: ICD-10-CM

## 2019-10-17 DIAGNOSIS — Z71.85 VACCINE COUNSELING: ICD-10-CM

## 2019-10-17 DIAGNOSIS — Z79.4 TYPE 2 DIABETES MELLITUS WITH DIABETIC NEUROPATHY, WITH LONG-TERM CURRENT USE OF INSULIN (HCC): Primary | ICD-10-CM

## 2019-10-17 DIAGNOSIS — I35.0 MILD AORTIC STENOSIS: ICD-10-CM

## 2019-10-17 DIAGNOSIS — Z23 NEED FOR VACCINATION: ICD-10-CM

## 2019-10-17 DIAGNOSIS — J44.9 CHRONIC OBSTRUCTIVE PULMONARY DISEASE, UNSPECIFIED COPD TYPE (HCC): ICD-10-CM

## 2019-10-17 DIAGNOSIS — Z87.39 HISTORY OF GOUT: ICD-10-CM

## 2019-10-17 DIAGNOSIS — E11.40 TYPE 2 DIABETES MELLITUS WITH DIABETIC NEUROPATHY, WITH LONG-TERM CURRENT USE OF INSULIN (HCC): Primary | ICD-10-CM

## 2019-10-17 DIAGNOSIS — I10 ESSENTIAL HYPERTENSION: ICD-10-CM

## 2019-10-17 DIAGNOSIS — E11.69 DIABETES MELLITUS TYPE 2 IN OBESE (HCC): ICD-10-CM

## 2019-10-17 DIAGNOSIS — E55.9 VITAMIN D DEFICIENCY: ICD-10-CM

## 2019-10-17 DIAGNOSIS — K22.719 BARRETT'S ESOPHAGUS WITH DYSPLASIA: ICD-10-CM

## 2019-10-17 PROCEDURE — G0008 ADMIN INFLUENZA VIRUS VAC: HCPCS | Performed by: FAMILY MEDICINE

## 2019-10-17 PROCEDURE — 90662 IIV NO PRSV INCREASED AG IM: CPT | Performed by: FAMILY MEDICINE

## 2019-10-17 PROCEDURE — 99214 OFFICE O/P EST MOD 30 MIN: CPT | Performed by: FAMILY MEDICINE

## 2019-10-17 NOTE — PROGRESS NOTES
HPI:   Mindy Langston is a 75year old male who presents for recheck of his diabetes, hypertension and dyslipidemia. Patient has not been checking his FBS's at home regularly. Last eye exam -- 5 months ago.   Patient presents for recheck of his hypert Cholesterol, Total 141 <200 mg/dL    HDL Cholesterol 40 40 - 59 mg/dL    Triglycerides 128 30 - 149 mg/dL    LDL Cholesterol 75 <100 mg/dL    VLDL 26 0 - 30 mg/dL    Non HDL Chol 101 <130 mg/dL   HEMOGLOBIN A1C   Result Value Ref Range    HgbA1C 7.2 (H) <5 CR, Take 1 tablet by mouth 2 (two) times daily with meals. , Disp: , Rfl:   Aspirin 81 MG Oral Chew Tab, Chew 81 mg by mouth daily. , Disp: , Rfl:   Multiple Vitamins-Minerals (CENTRUM SILVER OR), Take  by mouth., Disp: , Rfl:   Nitroglycerin 0.4 % Rectal Oi pack weekends, only 4 cigs QD week days    Alcohol use: Yes      Alcohol/week: 0.0 standard drinks      Comment: social/ 0 in last 4 mon    Drug use: No    Exercise: around the house.   Diet: watches fats closely, watches sugar closely and watches calories focus on healthier eating and exercise and weight loss. Discussed carbohydrate controlled diet. Continue to check feet daily. Monitor FBS and BP readings at home regularly and log results. Cont. glyburide to 2 tabs BID, janumet, and levemir.     A1c

## 2019-11-11 RX ORDER — VALSARTAN AND HYDROCHLOROTHIAZIDE 160; 25 MG/1; MG/1
1 TABLET ORAL
Qty: 90 TABLET | Refills: 0 | Status: SHIPPED | OUTPATIENT
Start: 2019-11-11 | End: 2020-02-26

## 2019-11-12 RX ORDER — OMEGA-3-ACID ETHYL ESTERS 1 G/1
CAPSULE, LIQUID FILLED ORAL
Qty: 360 CAPSULE | Refills: 0 | Status: SHIPPED | OUTPATIENT
Start: 2019-11-12 | End: 2020-03-11

## 2019-11-27 RX ORDER — VALSARTAN AND HYDROCHLOROTHIAZIDE 160; 25 MG/1; MG/1
TABLET ORAL
Qty: 60 TABLET | Refills: 0 | OUTPATIENT
Start: 2019-11-27

## 2020-01-03 RX ORDER — SITAGLIPTIN AND METFORMIN HYDROCHLORIDE 50; 1000 MG/1; MG/1
TABLET, FILM COATED, EXTENDED RELEASE ORAL
Qty: 180 TABLET | Refills: 0 | Status: SHIPPED | OUTPATIENT
Start: 2020-01-03 | End: 2020-04-27

## 2020-01-13 ENCOUNTER — TELEPHONE (OUTPATIENT)
Dept: CARDIOLOGY | Age: 65
End: 2020-01-13

## 2020-01-13 DIAGNOSIS — I35.0 AORTIC VALVE STENOSIS, ETIOLOGY OF CARDIAC VALVE DISEASE UNSPECIFIED: Primary | ICD-10-CM

## 2020-01-23 ENCOUNTER — LAB ENCOUNTER (OUTPATIENT)
Dept: LAB | Age: 65
End: 2020-01-23
Attending: FAMILY MEDICINE
Payer: MEDICARE

## 2020-01-23 DIAGNOSIS — I35.9 AORTIC VALVE CALCIFICATION: ICD-10-CM

## 2020-01-23 DIAGNOSIS — J44.9 CHRONIC OBSTRUCTIVE PULMONARY DISEASE, UNSPECIFIED COPD TYPE (HCC): ICD-10-CM

## 2020-01-23 DIAGNOSIS — I10 ESSENTIAL HYPERTENSION: ICD-10-CM

## 2020-01-23 DIAGNOSIS — Z79.4 TYPE 2 DIABETES MELLITUS WITH DIABETIC NEUROPATHY, WITH LONG-TERM CURRENT USE OF INSULIN (HCC): ICD-10-CM

## 2020-01-23 DIAGNOSIS — I05.9 MITRAL VALVE ANNULAR CALCIFICATION: ICD-10-CM

## 2020-01-23 DIAGNOSIS — F17.200 SMOKING: ICD-10-CM

## 2020-01-23 DIAGNOSIS — E78.5 DYSLIPIDEMIA: ICD-10-CM

## 2020-01-23 DIAGNOSIS — I35.0 MILD AORTIC STENOSIS: ICD-10-CM

## 2020-01-23 DIAGNOSIS — E11.40 TYPE 2 DIABETES MELLITUS WITH DIABETIC NEUROPATHY, WITH LONG-TERM CURRENT USE OF INSULIN (HCC): ICD-10-CM

## 2020-01-23 LAB
ALBUMIN SERPL-MCNC: 3.8 G/DL (ref 3.4–5)
ALBUMIN/GLOB SERPL: 1 {RATIO} (ref 1–2)
ALP LIVER SERPL-CCNC: 89 U/L (ref 45–117)
ALT SERPL-CCNC: 29 U/L (ref 16–61)
ANION GAP SERPL CALC-SCNC: 6 MMOL/L (ref 0–18)
AST SERPL-CCNC: 14 U/L (ref 15–37)
BASOPHILS # BLD AUTO: 0.05 X10(3) UL (ref 0–0.2)
BASOPHILS NFR BLD AUTO: 0.6 %
BILIRUB SERPL-MCNC: 0.5 MG/DL (ref 0.1–2)
BUN BLD-MCNC: 36 MG/DL (ref 7–18)
BUN/CREAT SERPL: 33 (ref 10–20)
CALCIUM BLD-MCNC: 9.6 MG/DL (ref 8.5–10.1)
CHLORIDE SERPL-SCNC: 106 MMOL/L (ref 98–112)
CHOLEST SMN-MCNC: 119 MG/DL (ref ?–200)
CO2 SERPL-SCNC: 26 MMOL/L (ref 21–32)
CREAT BLD-MCNC: 1.09 MG/DL (ref 0.7–1.3)
CREAT UR-SCNC: 67.7 MG/DL
DEPRECATED RDW RBC AUTO: 45.2 FL (ref 35.1–46.3)
EOSINOPHIL # BLD AUTO: 0.14 X10(3) UL (ref 0–0.7)
EOSINOPHIL NFR BLD AUTO: 1.8 %
ERYTHROCYTE [DISTWIDTH] IN BLOOD BY AUTOMATED COUNT: 14 % (ref 11–15)
EST. AVERAGE GLUCOSE BLD GHB EST-MCNC: 137 MG/DL (ref 68–126)
GLOBULIN PLAS-MCNC: 4 G/DL (ref 2.8–4.4)
GLUCOSE BLD-MCNC: 179 MG/DL (ref 70–99)
HBA1C MFR BLD HPLC: 6.4 % (ref ?–5.7)
HCT VFR BLD AUTO: 43.1 % (ref 39–53)
HDLC SERPL-MCNC: 36 MG/DL (ref 40–59)
HGB BLD-MCNC: 13.7 G/DL (ref 13–17.5)
IMM GRANULOCYTES # BLD AUTO: 0.07 X10(3) UL (ref 0–1)
IMM GRANULOCYTES NFR BLD: 0.9 %
LDLC SERPL CALC-MCNC: 53 MG/DL (ref ?–100)
LYMPHOCYTES # BLD AUTO: 2.16 X10(3) UL (ref 1–4)
LYMPHOCYTES NFR BLD AUTO: 28 %
M PROTEIN MFR SERPL ELPH: 7.8 G/DL (ref 6.4–8.2)
MCH RBC QN AUTO: 28.2 PG (ref 26–34)
MCHC RBC AUTO-ENTMCNC: 31.8 G/DL (ref 31–37)
MCV RBC AUTO: 88.7 FL (ref 80–100)
MICROALBUMIN UR-MCNC: 3.19 MG/DL
MICROALBUMIN/CREAT 24H UR-RTO: 47.1 UG/MG (ref ?–30)
MONOCYTES # BLD AUTO: 0.5 X10(3) UL (ref 0.1–1)
MONOCYTES NFR BLD AUTO: 6.5 %
NEUTROPHILS # BLD AUTO: 4.79 X10 (3) UL (ref 1.5–7.7)
NEUTROPHILS # BLD AUTO: 4.79 X10(3) UL (ref 1.5–7.7)
NEUTROPHILS NFR BLD AUTO: 62.2 %
NONHDLC SERPL-MCNC: 83 MG/DL (ref ?–130)
OSMOLALITY SERPL CALC.SUM OF ELEC: 299 MOSM/KG (ref 275–295)
PATIENT FASTING Y/N/NP: YES
PATIENT FASTING Y/N/NP: YES
PLATELET # BLD AUTO: 231 10(3)UL (ref 150–450)
POTASSIUM SERPL-SCNC: 4.3 MMOL/L (ref 3.5–5.1)
RBC # BLD AUTO: 4.86 X10(6)UL (ref 3.8–5.8)
SODIUM SERPL-SCNC: 138 MMOL/L (ref 136–145)
TRIGL SERPL-MCNC: 148 MG/DL (ref 30–149)
VLDLC SERPL CALC-MCNC: 30 MG/DL (ref 0–30)
WBC # BLD AUTO: 7.7 X10(3) UL (ref 4–11)

## 2020-01-23 PROCEDURE — 80061 LIPID PANEL: CPT

## 2020-01-23 PROCEDURE — 83036 HEMOGLOBIN GLYCOSYLATED A1C: CPT

## 2020-01-23 PROCEDURE — 80053 COMPREHEN METABOLIC PANEL: CPT

## 2020-01-23 PROCEDURE — 82570 ASSAY OF URINE CREATININE: CPT

## 2020-01-23 PROCEDURE — 82043 UR ALBUMIN QUANTITATIVE: CPT

## 2020-01-23 PROCEDURE — 36415 COLL VENOUS BLD VENIPUNCTURE: CPT

## 2020-01-23 PROCEDURE — 85025 COMPLETE CBC W/AUTO DIFF WBC: CPT

## 2020-01-27 ENCOUNTER — OFFICE VISIT (OUTPATIENT)
Dept: FAMILY MEDICINE CLINIC | Facility: CLINIC | Age: 65
End: 2020-01-27
Payer: MEDICARE

## 2020-01-27 VITALS
RESPIRATION RATE: 14 BRPM | HEART RATE: 78 BPM | WEIGHT: 255 LBS | BODY MASS INDEX: 36.51 KG/M2 | HEIGHT: 70 IN | DIASTOLIC BLOOD PRESSURE: 60 MMHG | SYSTOLIC BLOOD PRESSURE: 118 MMHG

## 2020-01-27 DIAGNOSIS — K57.30 DIVERTICULOSIS OF COLON: ICD-10-CM

## 2020-01-27 DIAGNOSIS — E11.69 DIABETES MELLITUS TYPE 2 IN OBESE (HCC): ICD-10-CM

## 2020-01-27 DIAGNOSIS — Z87.39 HISTORY OF GOUT: ICD-10-CM

## 2020-01-27 DIAGNOSIS — J44.9 CHRONIC OBSTRUCTIVE PULMONARY DISEASE, UNSPECIFIED COPD TYPE (HCC): ICD-10-CM

## 2020-01-27 DIAGNOSIS — Z79.4 TYPE 2 DIABETES MELLITUS WITH DIABETIC NEUROPATHY, WITH LONG-TERM CURRENT USE OF INSULIN (HCC): ICD-10-CM

## 2020-01-27 DIAGNOSIS — I05.9 MITRAL VALVE ANNULAR CALCIFICATION: ICD-10-CM

## 2020-01-27 DIAGNOSIS — I83.93 ASYMPTOMATIC VARICOSE VEINS OF BOTH LOWER EXTREMITIES: ICD-10-CM

## 2020-01-27 DIAGNOSIS — E66.9 DIABETES MELLITUS TYPE 2 IN OBESE (HCC): ICD-10-CM

## 2020-01-27 DIAGNOSIS — I35.0 MILD AORTIC STENOSIS: ICD-10-CM

## 2020-01-27 DIAGNOSIS — E78.5 DYSLIPIDEMIA: ICD-10-CM

## 2020-01-27 DIAGNOSIS — N40.1 BENIGN PROSTATIC HYPERPLASIA WITH LOWER URINARY TRACT SYMPTOMS, SYMPTOM DETAILS UNSPECIFIED: ICD-10-CM

## 2020-01-27 DIAGNOSIS — Z12.5 SCREENING FOR PROSTATE CANCER: ICD-10-CM

## 2020-01-27 DIAGNOSIS — K80.20 GALLSTONES: ICD-10-CM

## 2020-01-27 DIAGNOSIS — Z87.891 PERSONAL HISTORY OF NICOTINE DEPENDENCE: ICD-10-CM

## 2020-01-27 DIAGNOSIS — E55.9 VITAMIN D DEFICIENCY: ICD-10-CM

## 2020-01-27 DIAGNOSIS — Z13.0 SCREENING FOR DEFICIENCY ANEMIA: ICD-10-CM

## 2020-01-27 DIAGNOSIS — E66.01 CLASS 2 SEVERE OBESITY DUE TO EXCESS CALORIES WITH SERIOUS COMORBIDITY AND BODY MASS INDEX (BMI) OF 37.0 TO 37.9 IN ADULT (HCC): ICD-10-CM

## 2020-01-27 DIAGNOSIS — E11.40 TYPE 2 DIABETES MELLITUS WITH DIABETIC NEUROPATHY, WITH LONG-TERM CURRENT USE OF INSULIN (HCC): ICD-10-CM

## 2020-01-27 DIAGNOSIS — I10 ESSENTIAL HYPERTENSION: ICD-10-CM

## 2020-01-27 DIAGNOSIS — I35.9 AORTIC VALVE CALCIFICATION: ICD-10-CM

## 2020-01-27 DIAGNOSIS — F17.200 SMOKING: ICD-10-CM

## 2020-01-27 DIAGNOSIS — K64.4 EXTERNAL HEMORRHOID: ICD-10-CM

## 2020-01-27 DIAGNOSIS — Z13.31 DEPRESSION SCREENING: ICD-10-CM

## 2020-01-27 DIAGNOSIS — K22.719 BARRETT'S ESOPHAGUS WITH DYSPLASIA: ICD-10-CM

## 2020-01-27 DIAGNOSIS — Z00.00 ENCOUNTER FOR ANNUAL HEALTH EXAMINATION: Primary | ICD-10-CM

## 2020-01-27 PROCEDURE — G0439 PPPS, SUBSEQ VISIT: HCPCS | Performed by: FAMILY MEDICINE

## 2020-01-27 PROCEDURE — G0447 BEHAVIOR COUNSEL OBESITY 15M: HCPCS | Performed by: FAMILY MEDICINE

## 2020-01-27 PROCEDURE — 99214 OFFICE O/P EST MOD 30 MIN: CPT | Performed by: FAMILY MEDICINE

## 2020-01-27 PROCEDURE — 99406 BEHAV CHNG SMOKING 3-10 MIN: CPT | Performed by: FAMILY MEDICINE

## 2020-01-27 NOTE — PROGRESS NOTES
Tobacco Cessation Documentation (Smoking and Smokeless included): I had an in depth therapy session with Sydni Diaz about his tobacco use risks and options using the USPSTF's Five A's approach:    Ask: Cyndie Rose is using tobacco products.   Asse obesity. Advise: We discussed clear and specific personalized plan for behavioral change including discussion about personal harm from his obesity and benefits of his losing weight. Agree:  Through a collaborative effort, we selected treatment goals o a Living Will but we do NOT have it on file in [address] [place] [address] Rd. The patient has this document but we do not have it in [facility], and patient is instructed to get our office a copy of it for scanning into Epic.          He has a Power of  for [employer] on file kg)  08/01/19 : 259 lb (117.5 kg)     Last Cholesterol Labs:   Lab Results   Component Value Date    CHOLEST 119 01/23/2020    HDL 36 (L) 01/23/2020    LDL 53 01/23/2020    TRIG 148 01/23/2020          Last Chemistry Labs:   Lab Results   Component Value D apply Misc, Use with Levemir at night and Humalog 3 times daily depending on diet plan  Niacin CR (SLO-NIACIN) 500 MG Oral Tab CR, Take 1 tablet by mouth 2 (two) times daily with meals. Aspirin 81 MG Oral Chew Tab, Chew 81 mg by mouth daily.   Multiple Vit incontinence  MUSCULOSKELETAL: denies back pain  NEURO: denies headaches  PSYCHE: denies depression or anxiety  HEMATOLOGIC: denies hx of anemia  ENDOCRINE: denies thyroid history  ALL/ASTHMA: denies hx of allergy or asthma    EXAM:   /60   Pulse 78 normal.  Bilateral monofilament/sensation of both feet is normal.  Pulsation pedal pulse exam of both lower legs/feet is normal as well.          Vaccination History     Immunization History   Administered Date(s) Administered   • >=3 YRS FLUZONE OR FLUARIX SCREEN/DIGITAL    Depression screening  -     DEPRESSION SCREEN ANNUAL    Dyslipidemia  -     LIPID PANEL;  Future  -     TSH W REFLEX TO FREE T4; Future    Essential hypertension    Vitamin D deficiency  -     VITAMIN D, 25-HYDROXY; Future    Chronic obstr 10 MIN    Body mass index (BMI) of 36.0-36.9 in adult    Screening for prostate cancer  -     Cancel: PROSTATE CANCER SCREEN/DIGITAL    Depression screening  -     DEPRESSION SCREEN ANNUAL    Dyslipidemia  -     LIPID PANEL;  Future  -     TSH W REFLEX TO F (%)   Date Value   01/23/2020 6.4 (H)       No flowsheet data found.     Fasting Blood Sugar (FSB)Annually Glucose (mg/dL)   Date Value   01/23/2020 179 (H)     GLUCOSE (mg/dL)   Date Value   07/03/2014 145 (H)   02/13/2012 220 (H)       Cardiovascular Dise Medicare Part B) No vaccine history found This may be covered with your prescription benefits, but Medicare does not cover unless Medically needed    Zoster   Not covered by Medicare Part B No vaccine history found This may be covered with your pharmacy  p

## 2020-01-27 NOTE — PATIENT INSTRUCTIONS
[facility] [facility] SCREENING SCHEDULE   Tests on this list are recommended by your physician but may not be covered, or covered at this frequency, by your insurer. Please check with your insurance carrier before scheduling to verify coverage.     PREVEN Electrocardiogram date01/24/2019 Routine EKG is not a screening covered service except at the [place] to Medicare Visit    Abdominal aortic aneurysm screening (once between ages 73-68)  No results found for this or any previous visit.  Limited to patients w • INFLUENZA VIRUS VACCINE, PRESERV FREE, >=1YEARS OF AGE    Please get every year    Pneumococcal 13 (Prevnar)  Covered Once after 65 Orders placed or performed in visit on 05/23/18   • PNEUMOCOCCAL VACC, 13 KELSEY IM    Please get once after your 65th bir available on it's website for anyone to review and print using their home computer and printer. (the forms are also available in [address])  [email or web address]. org  This link also has information from the [address] regarding Advance Direc Value   07/03/2014 117     TRIGLYCERIDES (mg/dL)   Date Value   07/03/2014 124   02/13/2012 164 (H)     Triglycerides (mg/dL)   Date Value   01/23/2020 148        EKG - covered if needed at Welcome to Medicare, and non-screening if indicated for medical re • FLU VAC NO PRSV 4 KELSEY 3 YRS+   Orders placed or performed in visit on 10/13/15   • FLU VAC NO PRSV 4 KELSEY 3 YRS+   Orders placed or performed in visit on 12/05/14   • INFLUENZA VIRUS VACCINE, >=1YEARS OF AGE   Orders placed or performed in visit on 12/ Medical Society website. http://[email or web address] idph.state. [place]/public/books/advin.htm  A link to the [facility]. This site has a lot of good information including definitions of the different types of Advance Directives.  It also has the [facility]

## 2020-01-29 RX ORDER — INSULIN DETEMIR 100 [IU]/ML
INJECTION, SOLUTION SUBCUTANEOUS
Qty: 20 PEN | Refills: 1 | Status: SHIPPED | OUTPATIENT
Start: 2020-01-29 | End: 2020-12-08 | Stop reason: DRUGHIGH

## 2020-01-29 NOTE — TELEPHONE ENCOUNTER
Not protocol medication. LOV : 1/27/20 JUHI  Last labs done :1/23/20  Next appointment :4/27/20  Please see pending medication. Refill if appropriate.    Last refill:    Date:11/12/19  Amount :30 mL 1 refill   Medication: novolog flexpen

## 2020-01-29 NOTE — TELEPHONE ENCOUNTER
Not protocol medication. LOV :1/27/20 jillian   Last labs done :1/23/20  Next appointment :4/24/2020  Please see pending medication. Refill if appropriate.    Last refill:    Date:11/12/19  Amount :30 mL 1 refill   Medication: Novolog flexpen

## 2020-02-17 ENCOUNTER — HOSPITAL ENCOUNTER (OUTPATIENT)
Dept: CV DIAGNOSTICS | Facility: HOSPITAL | Age: 65
Discharge: HOME | End: 2020-02-17
Attending: INTERNAL MEDICINE
Payer: MEDICARE

## 2020-02-17 DIAGNOSIS — I35.0 AORTIC VALVE STENOSIS, ETIOLOGY OF CARDIAC VALVE DISEASE UNSPECIFIED: ICD-10-CM

## 2020-02-17 DIAGNOSIS — I10 BENIGN HYPERTENSION: ICD-10-CM

## 2020-02-17 PROCEDURE — 93306 TTE W/DOPPLER COMPLETE: CPT | Performed by: INTERNAL MEDICINE

## 2020-02-18 ENCOUNTER — TELEPHONE (OUTPATIENT)
Dept: CARDIOLOGY | Age: 65
End: 2020-02-18

## 2020-02-18 DIAGNOSIS — I35.0 AORTIC VALVE STENOSIS, ETIOLOGY OF CARDIAC VALVE DISEASE UNSPECIFIED: ICD-10-CM

## 2020-02-20 ENCOUNTER — TELEPHONE (OUTPATIENT)
Dept: CARDIOLOGY | Age: 65
End: 2020-02-20

## 2020-02-26 RX ORDER — VALSARTAN AND HYDROCHLOROTHIAZIDE 160; 25 MG/1; MG/1
1 TABLET ORAL
Qty: 90 TABLET | Refills: 0 | Status: SHIPPED | OUTPATIENT
Start: 2020-02-26 | End: 2020-05-20

## 2020-02-27 RX ORDER — TAMSULOSIN HYDROCHLORIDE 0.4 MG/1
0.8 CAPSULE ORAL DAILY
Qty: 180 CAPSULE | Refills: 0 | Status: SHIPPED | OUTPATIENT
Start: 2020-02-27 | End: 2020-05-20

## 2020-03-04 ENCOUNTER — OFFICE VISIT (OUTPATIENT)
Dept: CARDIOLOGY | Age: 65
End: 2020-03-04

## 2020-03-04 ENCOUNTER — MED REC SCAN ONLY (OUTPATIENT)
Dept: FAMILY MEDICINE CLINIC | Facility: CLINIC | Age: 65
End: 2020-03-04

## 2020-03-04 VITALS
HEART RATE: 87 BPM | DIASTOLIC BLOOD PRESSURE: 60 MMHG | BODY MASS INDEX: 35.65 KG/M2 | WEIGHT: 249 LBS | SYSTOLIC BLOOD PRESSURE: 132 MMHG | HEIGHT: 70 IN

## 2020-03-04 DIAGNOSIS — I35.9 AORTIC VALVE CALCIFICATION: ICD-10-CM

## 2020-03-04 DIAGNOSIS — F17.200 SMOKING: ICD-10-CM

## 2020-03-04 DIAGNOSIS — I10 ESSENTIAL HYPERTENSION: Primary | ICD-10-CM

## 2020-03-04 DIAGNOSIS — E78.5 DYSLIPIDEMIA: ICD-10-CM

## 2020-03-04 PROBLEM — E11.9 DIABETES (CMD): Status: ACTIVE | Noted: 2020-03-04

## 2020-03-04 PROBLEM — I25.10 CORONARY ARTERY DISEASE INVOLVING NATIVE CORONARY ARTERY OF NATIVE HEART WITHOUT ANGINA PECTORIS: Status: ACTIVE | Noted: 2020-03-04

## 2020-03-04 PROCEDURE — 99214 OFFICE O/P EST MOD 30 MIN: CPT | Performed by: INTERNAL MEDICINE

## 2020-03-04 ASSESSMENT — ENCOUNTER SYMPTOMS
SUSPICIOUS LESIONS: 0
HEMOPTYSIS: 0
ALLERGIC/IMMUNOLOGIC COMMENTS: NO NEW FOOD ALLERGIES
HEMATOCHEZIA: 0
FEVER: 0
COUGH: 0
BRUISES/BLEEDS EASILY: 0
WEIGHT LOSS: 0
WEIGHT GAIN: 0
CHILLS: 0

## 2020-03-04 ASSESSMENT — PATIENT HEALTH QUESTIONNAIRE - PHQ9
1. LITTLE INTEREST OR PLEASURE IN DOING THINGS: NOT AT ALL
SUM OF ALL RESPONSES TO PHQ9 QUESTIONS 1 AND 2: 0
SUM OF ALL RESPONSES TO PHQ9 QUESTIONS 1 AND 2: 0
2. FEELING DOWN, DEPRESSED OR HOPELESS: NOT AT ALL

## 2020-03-11 RX ORDER — OMEGA-3-ACID ETHYL ESTERS 1 G/1
CAPSULE, LIQUID FILLED ORAL
Qty: 360 CAPSULE | Refills: 1 | Status: SHIPPED | OUTPATIENT
Start: 2020-03-11 | End: 2020-10-29

## 2020-03-11 RX ORDER — GLYBURIDE 5 MG/1
TABLET ORAL
Qty: 360 TABLET | Refills: 1 | Status: SHIPPED | OUTPATIENT
Start: 2020-03-11 | End: 2020-07-07

## 2020-03-11 RX ORDER — GLYBURIDE 5 MG/1
TABLET ORAL
Qty: 360 TABLET | Refills: 1 | Status: CANCELLED | OUTPATIENT
Start: 2020-03-11

## 2020-03-11 RX ORDER — OMEGA-3-ACID ETHYL ESTERS 1 G/1
CAPSULE, LIQUID FILLED ORAL
Qty: 360 CAPSULE | Refills: 0 | Status: CANCELLED | OUTPATIENT
Start: 2020-03-11

## 2020-04-27 ENCOUNTER — TELEMEDICINE (OUTPATIENT)
Dept: FAMILY MEDICINE CLINIC | Facility: CLINIC | Age: 65
End: 2020-04-27

## 2020-04-27 VITALS — BODY MASS INDEX: 35 KG/M2 | WEIGHT: 242.19 LBS | DIASTOLIC BLOOD PRESSURE: 72 MMHG | SYSTOLIC BLOOD PRESSURE: 132 MMHG

## 2020-04-27 DIAGNOSIS — E55.9 VITAMIN D DEFICIENCY: ICD-10-CM

## 2020-04-27 DIAGNOSIS — E66.9 DIABETES MELLITUS TYPE 2 IN OBESE (HCC): ICD-10-CM

## 2020-04-27 DIAGNOSIS — E11.69 DIABETES MELLITUS TYPE 2 IN OBESE (HCC): ICD-10-CM

## 2020-04-27 DIAGNOSIS — Z87.39 HISTORY OF GOUT: ICD-10-CM

## 2020-04-27 DIAGNOSIS — E11.40 TYPE 2 DIABETES MELLITUS WITH DIABETIC NEUROPATHY, WITH LONG-TERM CURRENT USE OF INSULIN (HCC): Primary | ICD-10-CM

## 2020-04-27 DIAGNOSIS — Z79.4 TYPE 2 DIABETES MELLITUS WITH DIABETIC NEUROPATHY, WITH LONG-TERM CURRENT USE OF INSULIN (HCC): Primary | ICD-10-CM

## 2020-04-27 DIAGNOSIS — K22.719 BARRETT'S ESOPHAGUS WITH DYSPLASIA: ICD-10-CM

## 2020-04-27 DIAGNOSIS — E78.5 DYSLIPIDEMIA: ICD-10-CM

## 2020-04-27 DIAGNOSIS — F17.200 SMOKING: ICD-10-CM

## 2020-04-27 DIAGNOSIS — I10 ESSENTIAL HYPERTENSION: ICD-10-CM

## 2020-04-27 DIAGNOSIS — J44.9 CHRONIC OBSTRUCTIVE PULMONARY DISEASE, UNSPECIFIED COPD TYPE (HCC): ICD-10-CM

## 2020-04-27 DIAGNOSIS — Z71.85 VACCINE COUNSELING: ICD-10-CM

## 2020-04-27 PROBLEM — Z87.19 HISTORY OF HEMORRHOIDS: Status: ACTIVE | Noted: 2020-04-27

## 2020-04-27 PROBLEM — I25.10 CORONARY ARTERY DISEASE INVOLVING NATIVE CORONARY ARTERY OF NATIVE HEART WITHOUT ANGINA PECTORIS: Status: ACTIVE | Noted: 2020-03-04

## 2020-04-27 PROBLEM — K64.2 PROLAPSED INTERNAL HEMORRHOIDS, GRADE 3: Status: RESOLVED | Noted: 2018-11-09 | Resolved: 2020-04-27

## 2020-04-27 PROBLEM — L29.0 PRURITUS ANI: Status: RESOLVED | Noted: 2019-03-28 | Resolved: 2020-04-27

## 2020-04-27 PROBLEM — K64.4 EXTERNAL PROLAPSED HEMORRHOIDS: Status: RESOLVED | Noted: 2018-12-15 | Resolved: 2020-04-27

## 2020-04-27 PROCEDURE — 99214 OFFICE O/P EST MOD 30 MIN: CPT | Performed by: FAMILY MEDICINE

## 2020-04-27 NOTE — PROGRESS NOTES
Pecolia Smoker consents to a virtual check in-service on 4/27/2020. Patient understands and accepts the financial responsibility for any deductible, coinsurance, and/or co-pays associated with the service.     Marshall Echavarria is a 75 year ol daily depending on diet plan, Disp: 360 each, Rfl: 1  Niacin CR (SLO-NIACIN) 500 MG Oral Tab CR, Take 1 tablet by mouth 2 (two) times daily with meals. , Disp: , Rfl:   Aspirin 81 MG Oral Chew Tab, Chew 81 mg by mouth daily. , Disp: , Rfl:   Multiple Vitamin 0.70 - 1.30 mg/dL    BUN/CREA Ratio 33.0 (H) 10.0 - 20.0    Calcium, Total 9.6 8.5 - 10.1 mg/dL    Calculated Osmolality 299 (H) 275 - 295 mOsm/kg    GFR, Non- 70 >=60    GFR, -American 81 >=60    AST 14 (L) 15 - 37 U/L    ALT 29 16 any unusual skin lesions  HEENT: no cough or congestion  LUNGS: denies shortness of breath with exertion  CARDIOVASCULAR: denies chest pain on exertion  GI: denies abdominal pain,denies heartburn  MUSCULOSKELETAL: denies back pain  PSYCHE: mood is doing we hanh to provide continuity of care in the best interest of the provider–patient relationship, due to ongoing public health crises/national emergency and because of restriction of visitation.   There are limitations of this visit as no physical exam could b

## 2020-05-20 RX ORDER — VALSARTAN AND HYDROCHLOROTHIAZIDE 160; 25 MG/1; MG/1
TABLET ORAL
Qty: 90 TABLET | Refills: 0 | Status: SHIPPED | OUTPATIENT
Start: 2020-05-20 | End: 2020-05-21 | Stop reason: RX

## 2020-05-20 RX ORDER — TAMSULOSIN HYDROCHLORIDE 0.4 MG/1
CAPSULE ORAL
Qty: 180 CAPSULE | Refills: 0 | Status: SHIPPED | OUTPATIENT
Start: 2020-05-20 | End: 2020-08-20

## 2020-05-21 RX ORDER — OLMESARTAN MEDOXOMIL 40 MG/1
TABLET ORAL
Qty: 90 TABLET | Refills: 1 | Status: SHIPPED | OUTPATIENT
Start: 2020-05-21 | End: 2020-11-27

## 2020-05-21 NOTE — TELEPHONE ENCOUNTER
Spoke to patient and he is aware of Valsartan currently on back-order. I did send Rx for Olmesartan 40 mg (see sig). Patient reports he will not be out of Valsartan for another 2 weeks.  He will check with pharmacy in 2 weeks, if back order status has [place]

## 2020-05-22 ENCOUNTER — TELEPHONE (OUTPATIENT)
Dept: FAMILY MEDICINE CLINIC | Facility: CLINIC | Age: 65
End: 2020-05-22

## 2020-05-22 RX ORDER — HYDROCHLOROTHIAZIDE 25 MG/1
25 TABLET ORAL DAILY
Qty: 90 TABLET | Refills: 0 | COMMUNITY
Start: 2020-05-22 | End: 2020-11-27

## 2020-06-03 ENCOUNTER — PATIENT MESSAGE (OUTPATIENT)
Dept: FAMILY MEDICINE CLINIC | Facility: CLINIC | Age: 65
End: 2020-06-03

## 2020-06-04 NOTE — TELEPHONE ENCOUNTER
From: Nba Duggan  To: Jo Ann Wilson DO  Sent: 6/3/2020 4:25 PM CDT  Subject: Prescription Question    I purchased a new \"Contour Next ONE\" glucose testing device.  Can I please get a prescription for \"Contour Next\" blood glucose test strips and

## 2020-06-05 PROBLEM — E10.9 INSULIN DEPENDENT DIABETES MELLITUS TYPE IA (HCC): Status: ACTIVE | Noted: 2020-06-05

## 2020-06-17 ENCOUNTER — HOSPITAL ENCOUNTER (EMERGENCY)
Facility: HOSPITAL | Age: 65
Discharge: HOME | End: 2020-06-17
Attending: EMERGENCY MEDICINE
Payer: MEDICARE

## 2020-06-17 VITALS
RESPIRATION RATE: 16 BRPM | HEART RATE: 69 BPM | SYSTOLIC BLOOD PRESSURE: 125 MMHG | DIASTOLIC BLOOD PRESSURE: 65 MMHG | BODY MASS INDEX: 33.79 KG/M2 | OXYGEN SATURATION: 97 % | HEIGHT: 70 IN | WEIGHT: 236 LBS | TEMPERATURE: 98 F

## 2020-06-17 DIAGNOSIS — E16.2 HYPOGLYCEMIA: Primary | ICD-10-CM

## 2020-06-17 DIAGNOSIS — E87.6 HYPOKALEMIA: ICD-10-CM

## 2020-06-17 PROCEDURE — 82962 GLUCOSE BLOOD TEST: CPT

## 2020-06-17 PROCEDURE — 93010 ELECTROCARDIOGRAM REPORT: CPT

## 2020-06-17 PROCEDURE — 99284 EMERGENCY DEPT VISIT MOD MDM: CPT

## 2020-06-17 PROCEDURE — 93005 ELECTROCARDIOGRAM TRACING: CPT

## 2020-06-17 PROCEDURE — 85025 COMPLETE CBC W/AUTO DIFF WBC: CPT | Performed by: PHYSICIAN ASSISTANT

## 2020-06-17 PROCEDURE — 36415 COLL VENOUS BLD VENIPUNCTURE: CPT

## 2020-06-17 PROCEDURE — 80053 COMPREHEN METABOLIC PANEL: CPT | Performed by: PHYSICIAN ASSISTANT

## 2020-06-17 RX ORDER — DEXTROSE MONOHYDRATE 25 G/50ML
50 INJECTION, SOLUTION INTRAVENOUS
Status: DISCONTINUED | OUTPATIENT
Start: 2020-06-17 | End: 2020-06-17

## 2020-06-17 RX ORDER — POTASSIUM CHLORIDE 20 MEQ/1
40 TABLET, EXTENDED RELEASE ORAL ONCE
Status: COMPLETED | OUTPATIENT
Start: 2020-06-17 | End: 2020-06-17

## 2020-06-18 ENCOUNTER — TELEMEDICINE (OUTPATIENT)
Dept: FAMILY MEDICINE CLINIC | Facility: CLINIC | Age: 65
End: 2020-06-18
Payer: MEDICARE

## 2020-06-18 DIAGNOSIS — E11.69 DIABETES MELLITUS TYPE 2 IN OBESE (HCC): ICD-10-CM

## 2020-06-18 DIAGNOSIS — E66.9 DIABETES MELLITUS TYPE 2 IN OBESE (HCC): ICD-10-CM

## 2020-06-18 DIAGNOSIS — Z09 HOSPITAL DISCHARGE FOLLOW-UP: Primary | ICD-10-CM

## 2020-06-18 DIAGNOSIS — E16.2 HYPOGLYCEMIA: ICD-10-CM

## 2020-06-18 DIAGNOSIS — E11.40 TYPE 2 DIABETES MELLITUS WITH DIABETIC NEUROPATHY, WITH LONG-TERM CURRENT USE OF INSULIN (HCC): ICD-10-CM

## 2020-06-18 DIAGNOSIS — Z79.4 CONTROLLED TYPE 2 DIABETES MELLITUS WITH HYPOGLYCEMIA, WITH LONG-TERM CURRENT USE OF INSULIN (HCC): ICD-10-CM

## 2020-06-18 DIAGNOSIS — E11.649 CONTROLLED TYPE 2 DIABETES MELLITUS WITH HYPOGLYCEMIA, WITH LONG-TERM CURRENT USE OF INSULIN (HCC): ICD-10-CM

## 2020-06-18 DIAGNOSIS — Z79.4 TYPE 2 DIABETES MELLITUS WITH DIABETIC NEUROPATHY, WITH LONG-TERM CURRENT USE OF INSULIN (HCC): ICD-10-CM

## 2020-06-18 DIAGNOSIS — T88.7XXA MEDICATION SIDE EFFECT: ICD-10-CM

## 2020-06-18 PROCEDURE — 99214 OFFICE O/P EST MOD 30 MIN: CPT | Performed by: FAMILY MEDICINE

## 2020-06-18 NOTE — ED INITIAL ASSESSMENT (HPI)
Wife stated patient \"was not acting right\" and called 911, pts sugar was 31 when medics arrived to the scene, given oral glucose by medics PTA, glucose recheck was 35. Pt A&O x 4 upon arrival. Medics started IV dextrose.  Pt states feeling better upon arr

## 2020-06-18 NOTE — PROGRESS NOTES
Roseline Madeline consents to a virtual check in-service on 6/18/2020. Patient understands and accepts the financial responsibility for any deductible, coinsurance, and/or co-pays associated with the service.     Long Rodríguez is a 77 year ol any directions from you. Kandace Brooks did offer the possibility that it could be a drug interaction or that my insulin needs adjustment.    I decided that I would not drive until this is understood and controlled. Karen Lawson are the first two times ever that my blood gl Subcutaneous Solution Pen-injector, INJECT 30 UNITS UNDER THE SKIN TWICE DAILY, Disp: 20 pen, Rfl: 1  Tiotropium Bromide Monohydrate (SPIRIVA HANDIHALER) 18 MCG Inhalation Cap, Inhale 1 capsule (18 mcg total) into the lungs daily. , Disp: 90 capsule, Rfl: 1 Years: 30.00        Pack years: 30        Types: Cigarettes      Smokeless tobacco: Never Used      Tobacco comment: 1.0 pack weekends, only 4 cigs QD week days    Alcohol use:  Yes      Alcohol/week: 0.0 standard drinks      Comment: social/ 0 in last 4 mo x10(6)uL    HGB 12.9 (L) 13.0 - 17.5 g/dL    HCT 39.5 39.0 - 53.0 %    .0 150.0 - 450.0 10(3)uL    MCV 84.6 80.0 - 100.0 fL    MCH 27.6 26.0 - 34.0 pg    MCHC 32.7 31.0 - 37.0 g/dL    RDW 15.7 (H) 11.0 - 15.0 %    RDW-SD 47.9 (H) 35.1 - 46.3 fL    N sugar and blood sugar 2 hours after his largest meal.  Advised to check blood sugar before driving. Advised to monitor symptoms. Patient will do blood work in 1 week to check where his A1c and blood sugar are. Patient has follow-up on July 7.       Shahnaz

## 2020-06-18 NOTE — ED PROVIDER NOTES
Patient Seen in: [facility] Emergency Department      History   No chief complaint on file.     Stated Complaint: Hypoglycemia    HPI    78-year-old male who comes in via EMS with wife complaining of an episode of hypoglycemia that occurred while he 2013    Dr. Bambi Hall; due in 5 years   • COLONOSCOPY,POSSIBLE BIOPSY,POSSIBLE POLYPECTOMY N/A 8/2/2019    Performed by Nereyda Drake MD at [address] [place] [address]  03/01/2011   • ESOPHAGOGASTRODUODENOSCOPY (EGD) N/A 7/2/2015    Performe intact; posterior pharynx without exudate or erythema. No trismus or stridor, uvula midline.    Lungs:  Clear to auscultation bilaterally, respirations unlabored, no wheezing, rales or rhonchi   Heart:  Regular rate & rhythm, S1 and S2 normal, no murmurs, r Rhythm  Reading: Sinus rhythm with nonspecific T wave abnormalities, when compared with previous EKG there is no significant change                        MDM     Discussed with and evaluated the patient with Dr. Bryan Cruz who agrees with assessment and plan. verbalized understanding of the discharge instructions and plan.

## 2020-06-24 RX ORDER — EZETIMIBE AND SIMVASTATIN 10; 40 MG/1; MG/1
TABLET ORAL
Qty: 45 TABLET | Refills: 1 | Status: SHIPPED | OUTPATIENT
Start: 2020-06-24 | End: 2021-02-11

## 2020-07-01 ENCOUNTER — APPOINTMENT (OUTPATIENT)
Dept: LAB | Age: 65
End: 2020-07-01
Attending: FAMILY MEDICINE
Payer: MEDICARE

## 2020-07-01 DIAGNOSIS — E55.9 VITAMIN D DEFICIENCY: ICD-10-CM

## 2020-07-01 DIAGNOSIS — Z79.4 TYPE 2 DIABETES MELLITUS WITH DIABETIC NEUROPATHY, WITH LONG-TERM CURRENT USE OF INSULIN (HCC): ICD-10-CM

## 2020-07-01 DIAGNOSIS — E78.5 DYSLIPIDEMIA: ICD-10-CM

## 2020-07-01 DIAGNOSIS — E11.40 TYPE 2 DIABETES MELLITUS WITH DIABETIC NEUROPATHY, WITH LONG-TERM CURRENT USE OF INSULIN (HCC): ICD-10-CM

## 2020-07-01 LAB
ALBUMIN SERPL-MCNC: 4 G/DL (ref 3.4–5)
ALBUMIN/GLOB SERPL: 1.1 {RATIO} (ref 1–2)
ALP LIVER SERPL-CCNC: 116 U/L (ref 45–117)
ALT SERPL-CCNC: 34 U/L (ref 16–61)
ANION GAP SERPL CALC-SCNC: 4 MMOL/L (ref 0–18)
AST SERPL-CCNC: 19 U/L (ref 15–37)
BILIRUB SERPL-MCNC: 0.6 MG/DL (ref 0.1–2)
BUN BLD-MCNC: 25 MG/DL (ref 7–18)
BUN/CREAT SERPL: 22.5 (ref 10–20)
CALCIUM BLD-MCNC: 9.5 MG/DL (ref 8.5–10.1)
CHLORIDE SERPL-SCNC: 106 MMOL/L (ref 98–112)
CHOLEST SMN-MCNC: 134 MG/DL (ref ?–200)
CO2 SERPL-SCNC: 28 MMOL/L (ref 21–32)
CREAT BLD-MCNC: 1.11 MG/DL (ref 0.7–1.3)
EST. AVERAGE GLUCOSE BLD GHB EST-MCNC: 120 MG/DL (ref 68–126)
GLOBULIN PLAS-MCNC: 3.8 G/DL (ref 2.8–4.4)
GLUCOSE BLD-MCNC: 113 MG/DL (ref 70–99)
HBA1C MFR BLD HPLC: 5.8 % (ref ?–5.7)
HDLC SERPL-MCNC: 55 MG/DL (ref 40–59)
LDLC SERPL CALC-MCNC: 60 MG/DL (ref ?–100)
M PROTEIN MFR SERPL ELPH: 7.8 G/DL (ref 6.4–8.2)
NONHDLC SERPL-MCNC: 79 MG/DL (ref ?–130)
OSMOLALITY SERPL CALC.SUM OF ELEC: 291 MOSM/KG (ref 275–295)
PATIENT FASTING Y/N/NP: YES
PATIENT FASTING Y/N/NP: YES
POTASSIUM SERPL-SCNC: 4.6 MMOL/L (ref 3.5–5.1)
SODIUM SERPL-SCNC: 138 MMOL/L (ref 136–145)
TRIGL SERPL-MCNC: 94 MG/DL (ref 30–149)
TSI SER-ACNC: 1.56 MIU/ML (ref 0.36–3.74)
VIT D+METAB SERPL-MCNC: 59.7 NG/ML (ref 30–100)
VLDLC SERPL CALC-MCNC: 19 MG/DL (ref 0–30)

## 2020-07-01 PROCEDURE — 82306 VITAMIN D 25 HYDROXY: CPT

## 2020-07-01 PROCEDURE — 36415 COLL VENOUS BLD VENIPUNCTURE: CPT

## 2020-07-01 PROCEDURE — 80061 LIPID PANEL: CPT

## 2020-07-01 PROCEDURE — 84443 ASSAY THYROID STIM HORMONE: CPT

## 2020-07-01 PROCEDURE — 83036 HEMOGLOBIN GLYCOSYLATED A1C: CPT

## 2020-07-01 PROCEDURE — 80053 COMPREHEN METABOLIC PANEL: CPT

## 2020-07-07 ENCOUNTER — OFFICE VISIT (OUTPATIENT)
Dept: FAMILY MEDICINE CLINIC | Facility: CLINIC | Age: 65
End: 2020-07-07
Payer: MEDICARE

## 2020-07-07 ENCOUNTER — MED REC SCAN ONLY (OUTPATIENT)
Dept: FAMILY MEDICINE CLINIC | Facility: CLINIC | Age: 65
End: 2020-07-07

## 2020-07-07 VITALS
SYSTOLIC BLOOD PRESSURE: 138 MMHG | WEIGHT: 239 LBS | TEMPERATURE: 99 F | DIASTOLIC BLOOD PRESSURE: 70 MMHG | HEIGHT: 70 IN | HEART RATE: 72 BPM | BODY MASS INDEX: 34.22 KG/M2

## 2020-07-07 DIAGNOSIS — Z79.899 MEDICATION MANAGEMENT: ICD-10-CM

## 2020-07-07 DIAGNOSIS — J44.9 CHRONIC OBSTRUCTIVE PULMONARY DISEASE, UNSPECIFIED COPD TYPE (HCC): ICD-10-CM

## 2020-07-07 DIAGNOSIS — E11.649 CONTROLLED TYPE 2 DIABETES MELLITUS WITH HYPOGLYCEMIA, WITH LONG-TERM CURRENT USE OF INSULIN (HCC): Primary | ICD-10-CM

## 2020-07-07 DIAGNOSIS — Z79.4 CONTROLLED TYPE 2 DIABETES MELLITUS WITH HYPOGLYCEMIA, WITH LONG-TERM CURRENT USE OF INSULIN (HCC): Primary | ICD-10-CM

## 2020-07-07 DIAGNOSIS — Z79.4 TYPE 2 DIABETES MELLITUS WITH DIABETIC NEUROPATHY, WITH LONG-TERM CURRENT USE OF INSULIN (HCC): ICD-10-CM

## 2020-07-07 DIAGNOSIS — E66.9 DIABETES MELLITUS TYPE 2 IN OBESE (HCC): ICD-10-CM

## 2020-07-07 DIAGNOSIS — K22.719 BARRETT'S ESOPHAGUS WITH DYSPLASIA: ICD-10-CM

## 2020-07-07 DIAGNOSIS — E78.5 DYSLIPIDEMIA: ICD-10-CM

## 2020-07-07 DIAGNOSIS — E55.9 VITAMIN D DEFICIENCY: ICD-10-CM

## 2020-07-07 DIAGNOSIS — I25.10 CORONARY ARTERY DISEASE INVOLVING NATIVE CORONARY ARTERY OF NATIVE HEART WITHOUT ANGINA PECTORIS: ICD-10-CM

## 2020-07-07 DIAGNOSIS — E11.40 TYPE 2 DIABETES MELLITUS WITH DIABETIC NEUROPATHY, WITH LONG-TERM CURRENT USE OF INSULIN (HCC): ICD-10-CM

## 2020-07-07 DIAGNOSIS — R35.0 FREQUENCY OF MICTURITION: ICD-10-CM

## 2020-07-07 DIAGNOSIS — Z12.5 SCREENING FOR PROSTATE CANCER: ICD-10-CM

## 2020-07-07 DIAGNOSIS — E11.69 DIABETES MELLITUS TYPE 2 IN OBESE (HCC): ICD-10-CM

## 2020-07-07 DIAGNOSIS — E66.09 CLASS 1 OBESITY DUE TO EXCESS CALORIES WITH BODY MASS INDEX (BMI) OF 34.0 TO 34.9 IN ADULT, UNSPECIFIED WHETHER SERIOUS COMORBIDITY PRESENT: ICD-10-CM

## 2020-07-07 DIAGNOSIS — Z87.39 HISTORY OF GOUT: ICD-10-CM

## 2020-07-07 DIAGNOSIS — F17.200 SMOKING: ICD-10-CM

## 2020-07-07 DIAGNOSIS — I10 ESSENTIAL HYPERTENSION: ICD-10-CM

## 2020-07-07 PROCEDURE — 99214 OFFICE O/P EST MOD 30 MIN: CPT | Performed by: FAMILY MEDICINE

## 2020-07-07 NOTE — PROGRESS NOTES
HPI:   [place] Abraham is a 77year old male who presents for recheck of his diabetes, hypertension and dyslipidemia. Patient has  been checking his FBS's at home regularly -- 's on avg but does habe low -- graph reviewed that he brought in.    Sara Ward BUN 25 (H) 7 - 18 mg/dL    Creatinine 1.11 0.70 - 1.30 mg/dL    BUN/CREA Ratio 22.5 (H) 10.0 - 20.0    Calcium, Total 9.5 8.5 - 10.1 mg/dL    Calculated Osmolality 291 275 - 295 mOsm/kg    GFR, Non- 68 >=60    GFR, -American 79 >= THE SKIN TWICE DAILY (Patient taking differently: 25 Units.  ) 20 pen 1   • Tiotropium Bromide Monohydrate (SPIRIVA HANDIHALER) 18 MCG Inhalation Cap Inhale 1 capsule (18 mcg total) into the lungs daily.  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  Bilateral monofilament/sensation of both feet is normal except diminished in left heel  Pulsation pedal pulse exam of both lower legs/feet is normal as well.     ASSESSMENT AND PLAN: dysplasia  Coronary artery disease involving native coronary artery of native heart without angina pectoris  Screening for prostate cancer  Frequency of micturition   Class 1 obesity due to excess calories with body mass index (bmi) of 34.0 to 34.9 in adul

## 2020-08-20 RX ORDER — TAMSULOSIN HYDROCHLORIDE 0.4 MG/1
CAPSULE ORAL
Qty: 180 CAPSULE | Refills: 1 | Status: SHIPPED | OUTPATIENT
Start: 2020-08-20 | End: 2021-02-18

## 2020-10-08 ENCOUNTER — LAB ENCOUNTER (OUTPATIENT)
Dept: LAB | Age: 65
End: 2020-10-08
Attending: FAMILY MEDICINE
Payer: MEDICARE

## 2020-10-08 DIAGNOSIS — E66.9 DIABETES MELLITUS TYPE 2 IN OBESE (HCC): ICD-10-CM

## 2020-10-08 DIAGNOSIS — E11.69 DIABETES MELLITUS TYPE 2 IN OBESE (HCC): ICD-10-CM

## 2020-10-08 DIAGNOSIS — E11.40 TYPE 2 DIABETES MELLITUS WITH DIABETIC NEUROPATHY, WITH LONG-TERM CURRENT USE OF INSULIN (HCC): ICD-10-CM

## 2020-10-08 DIAGNOSIS — E78.5 DYSLIPIDEMIA: ICD-10-CM

## 2020-10-08 DIAGNOSIS — Z12.5 SCREENING FOR PROSTATE CANCER: ICD-10-CM

## 2020-10-08 DIAGNOSIS — E11.649 CONTROLLED TYPE 2 DIABETES MELLITUS WITH HYPOGLYCEMIA, WITH LONG-TERM CURRENT USE OF INSULIN (HCC): ICD-10-CM

## 2020-10-08 DIAGNOSIS — I25.10 CORONARY ARTERY DISEASE INVOLVING NATIVE CORONARY ARTERY OF NATIVE HEART WITHOUT ANGINA PECTORIS: ICD-10-CM

## 2020-10-08 DIAGNOSIS — Z79.4 TYPE 2 DIABETES MELLITUS WITH DIABETIC NEUROPATHY, WITH LONG-TERM CURRENT USE OF INSULIN (HCC): ICD-10-CM

## 2020-10-08 DIAGNOSIS — R35.0 FREQUENCY OF MICTURITION: ICD-10-CM

## 2020-10-08 DIAGNOSIS — Z79.4 CONTROLLED TYPE 2 DIABETES MELLITUS WITH HYPOGLYCEMIA, WITH LONG-TERM CURRENT USE OF INSULIN (HCC): ICD-10-CM

## 2020-10-08 PROCEDURE — 80061 LIPID PANEL: CPT

## 2020-10-08 PROCEDURE — 36415 COLL VENOUS BLD VENIPUNCTURE: CPT

## 2020-10-08 PROCEDURE — 84153 ASSAY OF PSA TOTAL: CPT

## 2020-10-08 PROCEDURE — 80053 COMPREHEN METABOLIC PANEL: CPT

## 2020-10-08 PROCEDURE — 82043 UR ALBUMIN QUANTITATIVE: CPT

## 2020-10-08 PROCEDURE — 82570 ASSAY OF URINE CREATININE: CPT

## 2020-10-08 PROCEDURE — 83036 HEMOGLOBIN GLYCOSYLATED A1C: CPT

## 2020-10-13 ENCOUNTER — OFFICE VISIT (OUTPATIENT)
Dept: FAMILY MEDICINE CLINIC | Facility: CLINIC | Age: 65
End: 2020-10-13
Payer: MEDICARE

## 2020-10-13 VITALS
BODY MASS INDEX: 34.65 KG/M2 | HEART RATE: 76 BPM | RESPIRATION RATE: 18 BRPM | TEMPERATURE: 97 F | DIASTOLIC BLOOD PRESSURE: 66 MMHG | HEIGHT: 70 IN | WEIGHT: 242 LBS | SYSTOLIC BLOOD PRESSURE: 146 MMHG

## 2020-10-13 DIAGNOSIS — Z79.4 CONTROLLED TYPE 2 DIABETES MELLITUS WITH HYPOGLYCEMIA, WITH LONG-TERM CURRENT USE OF INSULIN (HCC): Primary | ICD-10-CM

## 2020-10-13 DIAGNOSIS — J44.9 CHRONIC OBSTRUCTIVE PULMONARY DISEASE, UNSPECIFIED COPD TYPE (HCC): ICD-10-CM

## 2020-10-13 DIAGNOSIS — M25.562 LEFT KNEE PAIN, UNSPECIFIED CHRONICITY: ICD-10-CM

## 2020-10-13 DIAGNOSIS — I25.10 CORONARY ARTERY DISEASE INVOLVING NATIVE CORONARY ARTERY OF NATIVE HEART WITHOUT ANGINA PECTORIS: ICD-10-CM

## 2020-10-13 DIAGNOSIS — I35.9 AORTIC VALVE CALCIFICATION: ICD-10-CM

## 2020-10-13 DIAGNOSIS — I10 ESSENTIAL HYPERTENSION: ICD-10-CM

## 2020-10-13 DIAGNOSIS — F17.200 SMOKING: ICD-10-CM

## 2020-10-13 DIAGNOSIS — I05.9 MITRAL VALVE ANNULAR CALCIFICATION: ICD-10-CM

## 2020-10-13 DIAGNOSIS — E55.9 VITAMIN D DEFICIENCY: ICD-10-CM

## 2020-10-13 DIAGNOSIS — E66.9 DIABETES MELLITUS TYPE 2 IN OBESE (HCC): ICD-10-CM

## 2020-10-13 DIAGNOSIS — K22.719 BARRETT'S ESOPHAGUS WITH DYSPLASIA: ICD-10-CM

## 2020-10-13 DIAGNOSIS — E78.5 DYSLIPIDEMIA: ICD-10-CM

## 2020-10-13 DIAGNOSIS — E11.69 DIABETES MELLITUS TYPE 2 IN OBESE (HCC): ICD-10-CM

## 2020-10-13 DIAGNOSIS — Z71.85 VACCINE COUNSELING: ICD-10-CM

## 2020-10-13 DIAGNOSIS — E11.649 CONTROLLED TYPE 2 DIABETES MELLITUS WITH HYPOGLYCEMIA, WITH LONG-TERM CURRENT USE OF INSULIN (HCC): Primary | ICD-10-CM

## 2020-10-13 DIAGNOSIS — Z87.39 HISTORY OF GOUT: ICD-10-CM

## 2020-10-13 DIAGNOSIS — Z79.4 TYPE 2 DIABETES MELLITUS WITH DIABETIC NEUROPATHY, WITH LONG-TERM CURRENT USE OF INSULIN (HCC): ICD-10-CM

## 2020-10-13 DIAGNOSIS — I35.0 MILD AORTIC STENOSIS: ICD-10-CM

## 2020-10-13 DIAGNOSIS — Z23 NEED FOR VACCINATION: ICD-10-CM

## 2020-10-13 DIAGNOSIS — E11.40 TYPE 2 DIABETES MELLITUS WITH DIABETIC NEUROPATHY, WITH LONG-TERM CURRENT USE OF INSULIN (HCC): ICD-10-CM

## 2020-10-13 PROCEDURE — 99214 OFFICE O/P EST MOD 30 MIN: CPT | Performed by: FAMILY MEDICINE

## 2020-10-13 PROCEDURE — 90662 IIV NO PRSV INCREASED AG IM: CPT | Performed by: FAMILY MEDICINE

## 2020-10-13 PROCEDURE — G0008 ADMIN INFLUENZA VIRUS VAC: HCPCS | Performed by: FAMILY MEDICINE

## 2020-10-13 NOTE — PROGRESS NOTES
HPI:   Sybil Pantoja is a 77year old male who presents for recheck of his diabetes, hypertension and dyslipidemia.  Patient has  been checking his FBS's at home regularly -- 130's  Last eye exam -- 6/ 2020  Patient presents for recheck of his hyperten Triglycerides 124 30 - 149 mg/dL    LDL Cholesterol 41 <100 mg/dL    VLDL 25 0 - 30 mg/dL    Non HDL Chol 66 <130 mg/dL    FASTING Yes    COMP METABOLIC PANEL (14)   Result Value Ref Range    Glucose 160 (H) 70 - 99 mg/dL    Sodium 134 (L) 136 - 145 mmol/L ETHYL ESTERS 1 g Oral Cap TAKE TWO CAPSULES BY MOUTH TWICE DAILY  360 capsule 1   • Insulin Aspart Pen (NOVOLOG FLEXPEN) 100 UNIT/ML Subcutaneous Solution Pen-injector INJECT 20 UNITS SUBCUTANEOUSLY THREE TIMES DAILY BEFORE MEALS (Patient taking differentl Surgical History:   Procedure Laterality Date   • COLONOSCOPY  03/30/2007   • COLONOSCOPY  2013     [facility]; due in 5 years   • COLONOSCOPY,POSSIBLE BIOPSY,POSSIBLE POLYPECTOMY N/A 8/2/2019    Performed by Carrie Doshi MD at [address] [place] [address] 34.72 kg/m²   GENERAL: well developed, well nourished,in no apparent distress, pleasant  SKIN: no rashes,no suspicious lesions  NECK: supple,no adenopathy,no bruits; no thyromegaly  LUNGS: clear to auscultation bilaterally; no rhonchi, rales or wheezing  C advised to lose 10 lbs. Barett's -- see Dr. Ko Valero echo from Dr. Tin Salinas. Advised physical therapy for his left knee discomfort that he gets after kneeling on it.   He states the discomfort last 3 to 4 days and at this time it is not worth doin

## 2020-10-29 RX ORDER — OMEGA-3-ACID ETHYL ESTERS 1 G/1
CAPSULE, LIQUID FILLED ORAL
Qty: 360 CAPSULE | Refills: 1 | Status: SHIPPED | OUTPATIENT
Start: 2020-10-29 | End: 2021-08-03

## 2020-11-27 RX ORDER — HYDROCHLOROTHIAZIDE 25 MG/1
25 TABLET ORAL DAILY
Qty: 90 TABLET | Refills: 1 | Status: SHIPPED | OUTPATIENT
Start: 2020-11-27 | End: 2021-06-07

## 2020-11-27 RX ORDER — OLMESARTAN MEDOXOMIL 40 MG/1
40 TABLET ORAL DAILY
Qty: 90 TABLET | Refills: 1 | Status: ON HOLD | OUTPATIENT
Start: 2020-11-27 | End: 2021-06-03

## 2020-12-08 ENCOUNTER — PATIENT MESSAGE (OUTPATIENT)
Dept: FAMILY MEDICINE CLINIC | Facility: CLINIC | Age: 65
End: 2020-12-08

## 2020-12-08 RX ORDER — INSULIN DETEMIR 100 [IU]/ML
INJECTION, SOLUTION SUBCUTANEOUS
Qty: 45 ML | Refills: 1 | Status: SHIPPED | OUTPATIENT
Start: 2020-12-08 | End: 2021-03-08

## 2020-12-08 NOTE — TELEPHONE ENCOUNTER
From: Jermaine Mendoza  To: Mundo Sparrow,   Sent: 12/8/2020 10:52 AM CST  Subject: Other    Ascencio Efren, in response to your voicemail. I take 20 units of Levemir in the morning and 25 units at night (45 total/day).

## 2021-01-14 ENCOUNTER — MED REC SCAN ONLY (OUTPATIENT)
Dept: FAMILY MEDICINE CLINIC | Facility: CLINIC | Age: 66
End: 2021-01-14

## 2021-01-25 DIAGNOSIS — Z23 NEED FOR VACCINATION: ICD-10-CM

## 2021-01-27 ENCOUNTER — IMMUNIZATION (OUTPATIENT)
Dept: LAB | Age: 66
End: 2021-01-27
Attending: HOSPITALIST
Payer: MEDICARE

## 2021-01-27 DIAGNOSIS — Z23 NEED FOR VACCINATION: Primary | ICD-10-CM

## 2021-01-27 PROCEDURE — 0001A SARSCOV2 VAC 30MCG/0.3ML IM: CPT

## 2021-02-11 RX ORDER — EZETIMIBE AND SIMVASTATIN 10; 40 MG/1; MG/1
TABLET ORAL
Qty: 45 TABLET | Refills: 1 | Status: SHIPPED | OUTPATIENT
Start: 2021-02-11 | End: 2021-02-15

## 2021-02-15 RX ORDER — EZETIMIBE AND SIMVASTATIN 10; 40 MG/1; MG/1
TABLET ORAL
Qty: 45 TABLET | Refills: 1 | Status: ON HOLD | OUTPATIENT
Start: 2021-02-15 | End: 2021-06-03

## 2021-02-17 ENCOUNTER — IMMUNIZATION (OUTPATIENT)
Dept: LAB | Age: 66
End: 2021-02-17
Attending: HOSPITALIST
Payer: MEDICARE

## 2021-02-17 DIAGNOSIS — Z23 NEED FOR VACCINATION: Primary | ICD-10-CM

## 2021-02-17 PROCEDURE — 0002A SARSCOV2 VAC 30MCG/0.3ML IM: CPT

## 2021-02-18 RX ORDER — TAMSULOSIN HYDROCHLORIDE 0.4 MG/1
CAPSULE ORAL
Qty: 180 CAPSULE | Refills: 0 | Status: SHIPPED | OUTPATIENT
Start: 2021-02-18 | End: 2021-04-20

## 2021-03-10 ENCOUNTER — OFFICE VISIT (OUTPATIENT)
Dept: CARDIOLOGY | Age: 66
End: 2021-03-10

## 2021-03-10 VITALS
WEIGHT: 241 LBS | HEART RATE: 90 BPM | DIASTOLIC BLOOD PRESSURE: 62 MMHG | SYSTOLIC BLOOD PRESSURE: 130 MMHG | BODY MASS INDEX: 34.58 KG/M2

## 2021-03-10 DIAGNOSIS — I35.9 AORTIC VALVE CALCIFICATION: ICD-10-CM

## 2021-03-10 DIAGNOSIS — F17.200 SMOKING: ICD-10-CM

## 2021-03-10 DIAGNOSIS — E78.5 DYSLIPIDEMIA: ICD-10-CM

## 2021-03-10 DIAGNOSIS — I25.10 CORONARY ARTERY DISEASE INVOLVING NATIVE CORONARY ARTERY OF NATIVE HEART WITHOUT ANGINA PECTORIS: Primary | ICD-10-CM

## 2021-03-10 DIAGNOSIS — I10 ESSENTIAL HYPERTENSION: ICD-10-CM

## 2021-03-10 PROCEDURE — 99213 OFFICE O/P EST LOW 20 MIN: CPT | Performed by: INTERNAL MEDICINE

## 2021-03-10 RX ORDER — HYDROCHLOROTHIAZIDE 25 MG/1
25 TABLET ORAL DAILY
COMMUNITY
Start: 2021-02-18

## 2021-03-10 RX ORDER — OMEGA-3-ACID ETHYL ESTERS 1 G/1
CAPSULE, LIQUID FILLED ORAL
COMMUNITY
Start: 2020-10-29

## 2021-03-10 RX ORDER — OLMESARTAN MEDOXOMIL 40 MG/1
40 TABLET ORAL DAILY
COMMUNITY
Start: 2021-02-18

## 2021-03-10 SDOH — HEALTH STABILITY: PHYSICAL HEALTH: ON AVERAGE, HOW MANY DAYS PER WEEK DO YOU ENGAGE IN MODERATE TO STRENUOUS EXERCISE (LIKE A BRISK WALK)?: 6 DAYS

## 2021-03-10 ASSESSMENT — PATIENT HEALTH QUESTIONNAIRE - PHQ9
SUM OF ALL RESPONSES TO PHQ9 QUESTIONS 1 AND 2: 0
SUM OF ALL RESPONSES TO PHQ9 QUESTIONS 1 AND 2: 0
1. LITTLE INTEREST OR PLEASURE IN DOING THINGS: NOT AT ALL
2. FEELING DOWN, DEPRESSED OR HOPELESS: NOT AT ALL
CLINICAL INTERPRETATION OF PHQ2 SCORE: NO FURTHER SCREENING NEEDED
CLINICAL INTERPRETATION OF PHQ9 SCORE: NO FURTHER SCREENING NEEDED

## 2021-03-10 ASSESSMENT — ENCOUNTER SYMPTOMS
WEIGHT LOSS: 0
HEMATOCHEZIA: 0
ALLERGIC/IMMUNOLOGIC COMMENTS: NO NEW FOOD ALLERGIES
HEMOPTYSIS: 0
COUGH: 0
BRUISES/BLEEDS EASILY: 0
WEIGHT GAIN: 0
FEVER: 0
SUSPICIOUS LESIONS: 0
CHILLS: 0

## 2021-03-11 ENCOUNTER — MED REC SCAN ONLY (OUTPATIENT)
Dept: FAMILY MEDICINE CLINIC | Facility: CLINIC | Age: 66
End: 2021-03-11

## 2021-03-19 ENCOUNTER — LAB ENCOUNTER (OUTPATIENT)
Dept: LAB | Age: 66
End: 2021-03-19
Attending: FAMILY MEDICINE
Payer: MEDICARE

## 2021-03-19 DIAGNOSIS — E11.649 CONTROLLED TYPE 2 DIABETES MELLITUS WITH HYPOGLYCEMIA, WITH LONG-TERM CURRENT USE OF INSULIN (HCC): ICD-10-CM

## 2021-03-19 DIAGNOSIS — E11.69 DIABETES MELLITUS TYPE 2 IN OBESE (HCC): ICD-10-CM

## 2021-03-19 DIAGNOSIS — Z79.4 TYPE 2 DIABETES MELLITUS WITH DIABETIC NEUROPATHY, WITH LONG-TERM CURRENT USE OF INSULIN (HCC): ICD-10-CM

## 2021-03-19 DIAGNOSIS — E55.9 VITAMIN D DEFICIENCY: ICD-10-CM

## 2021-03-19 DIAGNOSIS — Z79.4 CONTROLLED TYPE 2 DIABETES MELLITUS WITH HYPOGLYCEMIA, WITH LONG-TERM CURRENT USE OF INSULIN (HCC): ICD-10-CM

## 2021-03-19 DIAGNOSIS — E11.40 TYPE 2 DIABETES MELLITUS WITH DIABETIC NEUROPATHY, WITH LONG-TERM CURRENT USE OF INSULIN (HCC): ICD-10-CM

## 2021-03-19 DIAGNOSIS — E66.9 DIABETES MELLITUS TYPE 2 IN OBESE (HCC): ICD-10-CM

## 2021-03-19 DIAGNOSIS — E78.5 DYSLIPIDEMIA: ICD-10-CM

## 2021-03-19 LAB
ALBUMIN SERPL-MCNC: 3.8 G/DL (ref 3.4–5)
ALBUMIN/GLOB SERPL: 1 {RATIO} (ref 1–2)
ALP LIVER SERPL-CCNC: 90 U/L
ALT SERPL-CCNC: 26 U/L
ANION GAP SERPL CALC-SCNC: 6 MMOL/L (ref 0–18)
AST SERPL-CCNC: 13 U/L (ref 15–37)
BILIRUB SERPL-MCNC: 0.4 MG/DL (ref 0.1–2)
BUN BLD-MCNC: 34 MG/DL (ref 7–18)
BUN/CREAT SERPL: 30.6 (ref 10–20)
CALCIUM BLD-MCNC: 9.2 MG/DL (ref 8.5–10.1)
CHLORIDE SERPL-SCNC: 107 MMOL/L (ref 98–112)
CHOLEST SMN-MCNC: 120 MG/DL (ref ?–200)
CO2 SERPL-SCNC: 26 MMOL/L (ref 21–32)
CREAT BLD-MCNC: 1.11 MG/DL
CREAT UR-SCNC: 63.4 MG/DL
EST. AVERAGE GLUCOSE BLD GHB EST-MCNC: 171 MG/DL (ref 68–126)
GLOBULIN PLAS-MCNC: 4 G/DL (ref 2.8–4.4)
GLUCOSE BLD-MCNC: 155 MG/DL (ref 70–99)
HBA1C MFR BLD HPLC: 7.6 % (ref ?–5.7)
HDLC SERPL-MCNC: 40 MG/DL (ref 40–59)
LDLC SERPL CALC-MCNC: 54 MG/DL (ref ?–100)
M PROTEIN MFR SERPL ELPH: 7.8 G/DL (ref 6.4–8.2)
MICROALBUMIN UR-MCNC: 4.64 MG/DL
MICROALBUMIN/CREAT 24H UR-RTO: 73.2 UG/MG (ref ?–30)
NONHDLC SERPL-MCNC: 80 MG/DL (ref ?–130)
OSMOLALITY SERPL CALC.SUM OF ELEC: 299 MOSM/KG (ref 275–295)
PATIENT FASTING Y/N/NP: YES
PATIENT FASTING Y/N/NP: YES
POTASSIUM SERPL-SCNC: 4.2 MMOL/L (ref 3.5–5.1)
SODIUM SERPL-SCNC: 139 MMOL/L (ref 136–145)
TRIGL SERPL-MCNC: 129 MG/DL (ref 30–149)
VIT D+METAB SERPL-MCNC: 54.5 NG/ML (ref 30–100)
VLDLC SERPL CALC-MCNC: 26 MG/DL (ref 0–30)

## 2021-03-19 PROCEDURE — 80053 COMPREHEN METABOLIC PANEL: CPT

## 2021-03-19 PROCEDURE — 36415 COLL VENOUS BLD VENIPUNCTURE: CPT

## 2021-03-19 PROCEDURE — 82043 UR ALBUMIN QUANTITATIVE: CPT

## 2021-03-19 PROCEDURE — 83036 HEMOGLOBIN GLYCOSYLATED A1C: CPT

## 2021-03-19 PROCEDURE — 82306 VITAMIN D 25 HYDROXY: CPT

## 2021-03-19 PROCEDURE — 82570 ASSAY OF URINE CREATININE: CPT

## 2021-03-19 PROCEDURE — 80061 LIPID PANEL: CPT

## 2021-03-26 ENCOUNTER — OFFICE VISIT (OUTPATIENT)
Dept: FAMILY MEDICINE CLINIC | Facility: CLINIC | Age: 66
End: 2021-03-26
Payer: MEDICARE

## 2021-03-26 VITALS
HEIGHT: 70 IN | DIASTOLIC BLOOD PRESSURE: 70 MMHG | WEIGHT: 239 LBS | BODY MASS INDEX: 34.22 KG/M2 | HEART RATE: 98 BPM | SYSTOLIC BLOOD PRESSURE: 130 MMHG | RESPIRATION RATE: 18 BRPM

## 2021-03-26 DIAGNOSIS — I10 ESSENTIAL HYPERTENSION: ICD-10-CM

## 2021-03-26 DIAGNOSIS — Z79.4 TYPE 2 DIABETES MELLITUS WITH DIABETIC NEUROPATHY, WITH LONG-TERM CURRENT USE OF INSULIN (HCC): ICD-10-CM

## 2021-03-26 DIAGNOSIS — Z13.0 SCREENING FOR DEFICIENCY ANEMIA: ICD-10-CM

## 2021-03-26 DIAGNOSIS — Z80.0 FAMILY HISTORY OF MALIGNANT NEOPLASM OF GASTROINTESTINAL TRACT: ICD-10-CM

## 2021-03-26 DIAGNOSIS — Z13.31 DEPRESSION SCREENING: ICD-10-CM

## 2021-03-26 DIAGNOSIS — I35.0 MILD AORTIC STENOSIS: ICD-10-CM

## 2021-03-26 DIAGNOSIS — F17.200 SMOKING: ICD-10-CM

## 2021-03-26 DIAGNOSIS — Z79.899 MEDICATION MANAGEMENT: ICD-10-CM

## 2021-03-26 DIAGNOSIS — E11.69 DIABETES MELLITUS TYPE 2 IN OBESE (HCC): ICD-10-CM

## 2021-03-26 DIAGNOSIS — K44.9 DIAPHRAGMATIC HERNIA WITHOUT OBSTRUCTION AND WITHOUT GANGRENE: ICD-10-CM

## 2021-03-26 DIAGNOSIS — N40.1 BENIGN PROSTATIC HYPERPLASIA WITH LOWER URINARY TRACT SYMPTOMS, SYMPTOM DETAILS UNSPECIFIED: ICD-10-CM

## 2021-03-26 DIAGNOSIS — E55.9 VITAMIN D DEFICIENCY: ICD-10-CM

## 2021-03-26 DIAGNOSIS — E10.9 INSULIN DEPENDENT DIABETES MELLITUS TYPE IA (HCC): ICD-10-CM

## 2021-03-26 DIAGNOSIS — Z71.85 VACCINE COUNSELING: ICD-10-CM

## 2021-03-26 DIAGNOSIS — Z12.5 SCREENING FOR PROSTATE CANCER: ICD-10-CM

## 2021-03-26 DIAGNOSIS — Z87.19 HISTORY OF HEMORRHOIDS: ICD-10-CM

## 2021-03-26 DIAGNOSIS — K22.719 BARRETT'S ESOPHAGUS WITH DYSPLASIA: ICD-10-CM

## 2021-03-26 DIAGNOSIS — Z87.891 PERSONAL HISTORY OF NICOTINE DEPENDENCE: ICD-10-CM

## 2021-03-26 DIAGNOSIS — I25.10 CORONARY ARTERY DISEASE INVOLVING NATIVE CORONARY ARTERY OF NATIVE HEART WITHOUT ANGINA PECTORIS: ICD-10-CM

## 2021-03-26 DIAGNOSIS — Z00.00 ENCOUNTER FOR ANNUAL HEALTH EXAMINATION: Primary | ICD-10-CM

## 2021-03-26 DIAGNOSIS — K57.30 DIVERTICULOSIS OF COLON: ICD-10-CM

## 2021-03-26 DIAGNOSIS — Z87.39 HISTORY OF GOUT: ICD-10-CM

## 2021-03-26 DIAGNOSIS — I35.9 AORTIC VALVE CALCIFICATION: ICD-10-CM

## 2021-03-26 DIAGNOSIS — E78.5 DYSLIPIDEMIA: ICD-10-CM

## 2021-03-26 DIAGNOSIS — J44.9 CHRONIC OBSTRUCTIVE PULMONARY DISEASE, UNSPECIFIED COPD TYPE (HCC): ICD-10-CM

## 2021-03-26 DIAGNOSIS — I05.9 MITRAL VALVE ANNULAR CALCIFICATION: ICD-10-CM

## 2021-03-26 DIAGNOSIS — E66.9 DIABETES MELLITUS TYPE 2 IN OBESE (HCC): ICD-10-CM

## 2021-03-26 DIAGNOSIS — B35.1 ONYCHOMYCOSIS: ICD-10-CM

## 2021-03-26 DIAGNOSIS — E11.40 TYPE 2 DIABETES MELLITUS WITH DIABETIC NEUROPATHY, WITH LONG-TERM CURRENT USE OF INSULIN (HCC): ICD-10-CM

## 2021-03-26 DIAGNOSIS — K80.20 GALLSTONES: ICD-10-CM

## 2021-03-26 PROCEDURE — G0439 PPPS, SUBSEQ VISIT: HCPCS | Performed by: FAMILY MEDICINE

## 2021-03-26 PROCEDURE — G0444 DEPRESSION SCREEN ANNUAL: HCPCS | Performed by: FAMILY MEDICINE

## 2021-03-26 PROCEDURE — 99406 BEHAV CHNG SMOKING 3-10 MIN: CPT | Performed by: FAMILY MEDICINE

## 2021-03-26 PROCEDURE — G0447 BEHAVIOR COUNSEL OBESITY 15M: HCPCS | Performed by: FAMILY MEDICINE

## 2021-03-26 RX ORDER — INSULIN DETEMIR 100 [IU]/ML
INJECTION, SOLUTION SUBCUTANEOUS
COMMUNITY
Start: 2021-03-15 | End: 2021-04-07

## 2021-03-26 RX ORDER — CHLORAL HYDRATE 500 MG
CAPSULE ORAL
COMMUNITY
Start: 2020-10-29 | End: 2021-03-26

## 2021-03-26 NOTE — PROGRESS NOTES
HPI:   Gerhard Luna is a 77year old male who presents for a Medicare Subsequent Annual Wellness visit (Pt already had Initial Annual Wellness). Pt. Is here for a med check/AWV.   Diabetes -- doing well with exercise and weight loss; A1c up recen is at low risk.      Patient Care Team: Patient Care Team:  Linda Emery DO as PCP - General (Family Practice)  [facility] Emer[facility]  Shalonda Caldera NP (Nurse Practitioner)  Lillian Healy MD ([facility] 1681)    Patient Active Problem Delene Shaker CAPSULES BY MOUTH DAILY   Ezetimibe-Simvastatin 10-40 MG Oral Tab, TAKE ONE TABLET BY MOUTH EVERY OTHER DAY  METFORMIN HCL 1000 MG Oral Tab, TAKE 1 TABLET BY MOUTH TWICE DAILY WITH MEALS  Olmesartan Medoxomil 40 MG Oral Tab, Take 1 tablet (40 mg total) by or unspecified type diabetes mellitus without mention of complication, not stated as uncontrolled, Unspecified essential hypertension, and Unspecified gastritis and gastroduodenitis without mention of hemorrhage.     He  has a past surgical history that inc Screening    Screening Method: Finger Rub  Finger Rub Result: Pass                Visual Acuity                           General Appearance:  Alert, cooperative, no distress, appears stated age   Head:  Normocephalic, without obvious abnormality, atraumat PRSV Free (11126) 10/17/2019, 10/13/2020   • FLUAD High Dose 65 yr and older (76941) 10/23/2018   • FLULAVAL 6 months & older 0.5 ml Prefilled syringe (11229) 11/13/2017   • HEP A 12/07/2012, 07/24/2013   • HEP B, Adult 01/26/2015, 05/05/2015, 08/12/2015 aortic stenosis    Benign prostatic hyperplasia with lower urinary tract symptoms, symptom details unspecified    Diaphragmatic hernia without obstruction and without gangrene    Family history of malignant neoplasm of gastrointestinal tract    Diverticulo CHNG SMOKING GR THAN 3 UP TO 10 MIN    History of gout -- stable; CPM    Coronary artery disease involving native coronary artery of native heart without angina pectoris -- stable; CPM    Aortic valve calcification -- stable; CPM    Mitral valve annular ca SERVICES  INDICATIONS AND SCHEDULE Internal Lab or Procedure External Lab or Procedure   Diabetes Screening      HbgA1C   Annually HEMOGLOBIN A1c (% of total Hgb)   Date Value   02/13/2012 7.7 (H)     HGBA1C (%)   Date Value   07/03/2014 5.8 (H)     HgbA1C concentrates   Clients of institutions for the mentally retarded   Persons who live in the same house as a HepB virus carrier   Homosexual men   Illicit injectable drug abusers     Tetanus Toxoid  Only covered with a cut with metal- TD and TDaP Not covered Spirometry date: 08/25/2015 No flowsheet data found. Tobacco Cessation Documentation (Smoking and Smokeless included):    I had an in depth therapy session with Rohan Atkins about his tobacco use risks and options using the USPSTF's Five A's choice of behavior and assessed behavioral health risk of obesity. Advise: We discussed clear and specific personalized plan for behavioral change including discussion about personal harm from his obesity and benefits of his losing weight. Agree:  Thr

## 2021-03-26 NOTE — PATIENT INSTRUCTIONS
[id number] [facility] SCREENING SCHEDULE   Tests on this list are recommended by your physician but may not be covered, or covered at this frequency, by your insurer. Please check with your insurance carrier before scheduling to verify coverage.     PREVEN Electrocardiogram date06/17/2020 Routine EKG is not a screening covered service except at the [place] to Medicare Visit    Abdominal aortic aneurysm screening (once between ages 73-68)  No results found for this or any previous visit.  Limited to patients w VIRUS VACCINE, >=1YEARS OF AGE   Orders placed or performed in visit on 12/07/12   • INFLUENZA VIRUS VACCINE, PRESERV FREE, >=1YEARS OF AGE    Please get every year    Pneumococcal 13 (Prevnar)  Covered Once after 65 Orders placed or performed in visit o definitions of the different types of Advance Directives. It also has the State forms available on it's website for anyone to review and print using their home computer and printer. (the forms are also available in [address])  www. putitinwriting. org  This li contract  A quit-smoking contract gives you a goal. Write out the contract and sign it. Have it witnessed, if you like. Then keep the contract where you’ll see it often, or carry it with you. Read the contract when you’re tempted to smoke.    Take action  O 908-AVLA-HIF ([phone])  www.smokefree. gov 877-44U-QUIT (027-591-5254)  www.lung.org/stop-smoking/ 800-LUNGUSA (734-303-6420)  Jovita last reviewed this educational content on 5/1/2019  © 7645-1397 [employer]. All rights reserved.  This

## 2021-04-07 DIAGNOSIS — Z79.4 TYPE 2 DIABETES MELLITUS WITH DIABETIC NEUROPATHY, WITH LONG-TERM CURRENT USE OF INSULIN (HCC): Primary | ICD-10-CM

## 2021-04-07 DIAGNOSIS — E11.40 TYPE 2 DIABETES MELLITUS WITH DIABETIC NEUROPATHY, WITH LONG-TERM CURRENT USE OF INSULIN (HCC): Primary | ICD-10-CM

## 2021-04-07 RX ORDER — INSULIN DETEMIR 100 [IU]/ML
INJECTION, SOLUTION SUBCUTANEOUS
Qty: 15 PEN | Refills: 3 | Status: SHIPPED | OUTPATIENT
Start: 2021-04-07 | End: 2021-09-01

## 2021-04-13 ENCOUNTER — HOSPITAL ENCOUNTER (OUTPATIENT)
Dept: CT IMAGING | Age: 66
Discharge: HOME | End: 2021-04-13
Attending: FAMILY MEDICINE
Payer: MEDICARE

## 2021-04-13 DIAGNOSIS — F17.200 SMOKING: ICD-10-CM

## 2021-04-13 DIAGNOSIS — Z87.891 PERSONAL HISTORY OF NICOTINE DEPENDENCE: ICD-10-CM

## 2021-04-13 PROCEDURE — 71271 CT THORAX LUNG CANCER SCR C-: CPT | Performed by: FAMILY MEDICINE

## 2021-04-14 DIAGNOSIS — K80.20 GALLSTONES: Primary | ICD-10-CM

## 2021-04-14 DIAGNOSIS — N13.2 HYDRONEPHROSIS WITH URINARY OBSTRUCTION DUE TO RENAL CALCULUS: ICD-10-CM

## 2021-04-14 DIAGNOSIS — K92.89 FISTULA OF DIGESTIVE SYSTEM: ICD-10-CM

## 2021-04-18 ENCOUNTER — HOSPITAL ENCOUNTER (OUTPATIENT)
Dept: CT IMAGING | Age: 66
End: 2021-04-18
Attending: FAMILY MEDICINE
Payer: MEDICARE

## 2021-04-18 ENCOUNTER — HOSPITAL ENCOUNTER (OUTPATIENT)
Dept: ULTRASOUND IMAGING | Age: 66
Discharge: HOME | End: 2021-04-18
Attending: FAMILY MEDICINE
Payer: MEDICARE

## 2021-04-18 ENCOUNTER — APPOINTMENT (OUTPATIENT)
Dept: CT IMAGING | Age: 66
End: 2021-04-18
Attending: FAMILY MEDICINE
Payer: MEDICARE

## 2021-04-18 DIAGNOSIS — N13.2 HYDRONEPHROSIS WITH URINARY OBSTRUCTION DUE TO RENAL CALCULUS: ICD-10-CM

## 2021-04-18 PROCEDURE — 76770 US EXAM ABDO BACK WALL COMP: CPT | Performed by: FAMILY MEDICINE

## 2021-04-18 PROCEDURE — 76775 US EXAM ABDO BACK WALL LIM: CPT | Performed by: FAMILY MEDICINE

## 2021-04-19 ENCOUNTER — HOSPITAL ENCOUNTER (OUTPATIENT)
Dept: CT IMAGING | Age: 66
Discharge: HOME | End: 2021-04-19
Attending: FAMILY MEDICINE
Payer: MEDICARE

## 2021-04-19 DIAGNOSIS — K92.89 FISTULA OF DIGESTIVE SYSTEM: ICD-10-CM

## 2021-04-19 DIAGNOSIS — K80.20 GALLSTONES: ICD-10-CM

## 2021-04-19 PROCEDURE — 74177 CT ABD & PELVIS W/CONTRAST: CPT | Performed by: FAMILY MEDICINE

## 2021-04-20 ENCOUNTER — TELEPHONE (OUTPATIENT)
Dept: SURGERY | Facility: CLINIC | Age: 66
End: 2021-04-20

## 2021-04-20 ENCOUNTER — OFFICE VISIT (OUTPATIENT)
Dept: SURGERY | Facility: CLINIC | Age: 66
End: 2021-04-20
Payer: MEDICARE

## 2021-04-20 VITALS — DIASTOLIC BLOOD PRESSURE: 92 MMHG | SYSTOLIC BLOOD PRESSURE: 180 MMHG | HEART RATE: 78 BPM | TEMPERATURE: 97 F

## 2021-04-20 DIAGNOSIS — N13.30 BILATERAL HYDRONEPHROSIS: ICD-10-CM

## 2021-04-20 DIAGNOSIS — R31.21 ASYMPTOMATIC MICROSCOPIC HEMATURIA: ICD-10-CM

## 2021-04-20 DIAGNOSIS — N40.1 BPH WITH OBSTRUCTION/LOWER URINARY TRACT SYMPTOMS: ICD-10-CM

## 2021-04-20 DIAGNOSIS — N13.8 BPH WITH OBSTRUCTION/LOWER URINARY TRACT SYMPTOMS: ICD-10-CM

## 2021-04-20 DIAGNOSIS — R82.90 URINE FINDING: Primary | ICD-10-CM

## 2021-04-20 DIAGNOSIS — R33.9 URINARY RETENTION: ICD-10-CM

## 2021-04-20 PROCEDURE — 81003 URINALYSIS AUTO W/O SCOPE: CPT | Performed by: UROLOGY

## 2021-04-20 PROCEDURE — 99204 OFFICE O/P NEW MOD 45 MIN: CPT | Performed by: UROLOGY

## 2021-04-20 PROCEDURE — 51798 US URINE CAPACITY MEASURE: CPT | Performed by: UROLOGY

## 2021-04-20 PROCEDURE — 51702 INSERT TEMP BLADDER CATH: CPT | Performed by: UROLOGY

## 2021-04-20 RX ORDER — FINASTERIDE 5 MG/1
5 TABLET, FILM COATED ORAL DAILY
Qty: 90 TABLET | Refills: 6 | Status: SHIPPED | OUTPATIENT
Start: 2021-04-20

## 2021-04-20 RX ORDER — TAMSULOSIN HYDROCHLORIDE 0.4 MG/1
0.8 CAPSULE ORAL DAILY
Qty: 180 CAPSULE | Refills: 0 | Status: SHIPPED | OUTPATIENT
Start: 2021-04-20 | End: 2021-08-27

## 2021-04-20 NOTE — PROGRESS NOTES
I called patient and made patient aware of Dr. Blue Monteiro interpretation and recommendations. Patient denies back pain now, he states there are times it may bother him mildly, he lays down when it does then it goes away.  He states he already has an appointm

## 2021-04-20 NOTE — H&P
HPI:     Bandar Gooden is a 77year old male with a PMH of obesity, HTN, HL, DM, GERD, COPD, asthma, CAD, hemorrhoids, BPH/LUTS. He presents as a consult from Dr Rahel Sawant office with ***.      Reports *** year h/o LUTS which is ***  Has been on 0.8 gastroduodenitis without mention of hemorrhage       Past Surgical History:   Procedure Laterality Date   • COLONOSCOPY  03/30/2007   • COLONOSCOPY  2013    Dr. Palacio Come; due in 5 years   • DENTAL SURGERY PROCEDURE  03/01/2011   • OTHER SURGICAL HISTORY  07/ TWO CAPSULES BY MOUTH DAILY  180 capsule 0   • Ezetimibe-Simvastatin 10-40 MG Oral Tab TAKE ONE TABLET BY MOUTH EVERY OTHER DAY 45 tablet 1   • Olmesartan Medoxomil 40 MG Oral Tab Take 1 tablet (40 mg total) by mouth daily.  90 tablet 1   • hydrochlorothiaz point review of systems was completed. Pertinent positives and negatives noted in the the HPI.     PHYSICAL EXAM:     GENERAL APPEARANCE: well, developed, well nourished, in no acute distress  NEUROLOGIC: nonfocal, alert and oriented  HEAD: normocephalic,

## 2021-04-20 NOTE — TELEPHONE ENCOUNTER
Patient is scheduled for in office cystoscopy CPT 20781 on Wednesday, May 12th. Patient insurance is Medicare prime with supplemental, does not require prior authorization on CPT 55833 rendered in office.  Benny

## 2021-04-20 NOTE — PATIENT INSTRUCTIONS
1. Drink 40-60 ounces (2 liters) water per day or enough to keep urine clear to pale yellow. 2. Continue tamsulosin, start finasteride  3. Get kidney ultrasound in ~ 1 week.   4. Voiding trial and follow up with Dr Chato Stockton in ~ 2-3 weeks with cystoscopy that

## 2021-04-20 NOTE — H&P
HPI:     Efe Parsons is a 77year old male with a PMH of obesity, HTN, HL, DM, GERD, COPD, asthma, CAD, hemorrhoids, BPH/LUTS.  He presents as a consult from Dr Jazmin Uriarte office with h/o BPH and incidentally noted urinary retention with b/l hydron baseline severe LUTS as well as urinary retention but will wait and see how he in doing in a couple weeks. All questions/concerns addressed. Recommend maintain urethral catheter for 1-2 weeks to allow time for bladder to decompress.  Repeat RBUS in ~ 1 w Tobacco Use      Smoking status: Current Every Day Smoker        Packs/day: 1.00        Years: 30.00        Pack years: 30        Types: Cigarettes      Smokeless tobacco: Never Used      Tobacco comment: 1.0 pack weekends, only 4 cigs QD week days    Vapi Monohydrate (SPIRIVA HANDIHALER) 18 MCG Inhalation Cap Inhale 1 capsule (18 mcg total) into the lungs daily. 90 capsule 1   • OMEPRAZOLE OR Take 20 mg by mouth daily.        • Insulin Pen Needle (BD PEN NEEDLE SHORT U/F) 31G X 8 MM Does not apply Misc Pleas conteh  - RBUS in ~ 1 week  - Voiding trial and f/u with me in a couple weeks with office cystoscopy that day. Thank you for this consult. I will do my best to keep you informed of all significant urological developments.       Josué Martinez MD, FACS  [facility]

## 2021-04-20 NOTE — PROGRESS NOTES
Patient is here for consultation with Dr Ana Laura Hamilton. Bladder scan shows >2000ml. MD made aware. Ordered to place Nails cath 18Fr. Procedure explained. He verbalized understanding and agreeable. He lowered his pants and undergarments.  Genital area observed t

## 2021-04-21 ENCOUNTER — TELEPHONE (OUTPATIENT)
Dept: SURGERY | Facility: CLINIC | Age: 66
End: 2021-04-21

## 2021-04-21 RX ORDER — CIPROFLOXACIN 500 MG/1
500 TABLET, FILM COATED ORAL 2 TIMES DAILY
Qty: 14 TABLET | Refills: 0 | Status: SHIPPED | OUTPATIENT
Start: 2021-04-21 | End: 2021-04-28

## 2021-04-21 NOTE — TELEPHONE ENCOUNTER
I called and spoke to spouse Berta Decker. She stated that the pt has had dark red blood in his conteh bag that started yesterday. There was some clots this AM.  Per wife he has also had discomfort (burning) where the conteh is inserted.   I rec that the pt stop

## 2021-04-21 NOTE — TELEPHONE ENCOUNTER
I called Radha Morton (wife) andgave her message from [facility]:    \"I agree. Stop ASA and any blood thinners for about a week. Increase fluids to keep urine clear. I sent abx to his pharmacy that [facility] [address] [facility] like him to start.  I'm fine with him coming in for nurse visit to

## 2021-04-21 NOTE — TELEPHONE ENCOUNTER
I agree. Stop ASA and any blood thinners for about a week. Increase fluids to keep urine clear. I sent abx to his pharmacy that [facility] [address] like him to start.  I'm fine with him coming in for nurse visit to flush and aspirate conteh today or tomorrow if they want to

## 2021-04-21 NOTE — TELEPHONE ENCOUNTER
Spouse has ques for RN re pts catheter - states there is red blood and pt is uncomfortable - what med can he take

## 2021-04-26 ENCOUNTER — TELEPHONE (OUTPATIENT)
Dept: SURGERY | Facility: CLINIC | Age: 66
End: 2021-04-26

## 2021-04-26 NOTE — TELEPHONE ENCOUNTER
I called wife Ronny Ly and gave her message from [facility]:    \"Yes, I want him to have renal US with conteh in place. They should disregard instructions to come with full bladder as he has catheter. \"    She verbalized understanding and all questions were answere

## 2021-04-26 NOTE — TELEPHONE ENCOUNTER
Yes, I want him to have renal US with conteh in place. They should disregard instructions to come with full bladder as he has catheter.     Thanks

## 2021-04-26 NOTE — TELEPHONE ENCOUNTER
Spouse of pt calling states pt is scheduled for US for tomorrow and states he can't hold water in bladder because of catheter asking is the right test for pt or other option please advise

## 2021-04-27 ENCOUNTER — HOSPITAL ENCOUNTER (OUTPATIENT)
Dept: ULTRASOUND IMAGING | Age: 66
Discharge: HOME | End: 2021-04-27
Attending: UROLOGY
Payer: MEDICARE

## 2021-04-27 DIAGNOSIS — N13.30 BILATERAL HYDRONEPHROSIS: ICD-10-CM

## 2021-04-27 PROCEDURE — 76770 US EXAM ABDO BACK WALL COMP: CPT | Performed by: UROLOGY

## 2021-04-28 NOTE — PROGRESS NOTES
HPI:     Ray Bailey is a 77year old male with a PMH of obesity, HTN, HL, DM, GERD, COPD, asthma, CAD, hemorrhoids    Following for:  1.  Severe BPH/LUTS with urinary retention causing b/l hydronephrosis incidentally noted on recent imaging  - fo he would like to proceed with TURP. We discussed the risks and benefits to the procedure including, but not limited to, bleeding, infection, possible damage to surrounding structures. The patient understands and would like to proceed.  He is seeing Dr Rishabh Mesa water/fluids, RBUS in ~ 1 week to ensure hydronephrosis has resolved. Will attempt voiding trial within the next couple weeks and cystoscopy at that point given the microscopic hematuria.  We briefly discussed options such as TURP for baseline severe LUTS a below knee   • Cancer Sister         colon   • Diabetes Brother         Type II   • Other (Diabetic retinopathy[other]) Brother    • Cancer Sister         liver   • Cancer Sister         ovarian      Social History: Social History    Tobacco Use      Smoki UNIT/ML Subcutaneous Solution Pen-injector INJECT 20 UNITS SUBCUTANEOUSLY THREE TIMES DAILY BEFORE MEALS (Patient taking differently: INJECT 17 before each meal, MUST BE BRAND NOVOLOG PER INSURANCE ) 20 pen 1   • Tiotropium Bromide Monohydrate (Consuello Finder noted in gallbladder    Thanks again for this consult.     Jenae Au MD, [facility]  Urologist  [place] [address]  Office: [phone]

## 2021-05-04 NOTE — PROGRESS NOTES
Results reviewed. Hydronephrosis has resolved. US reporting stones but no stones on CT other than likely vascular calcification in left kidney.

## 2021-05-06 ENCOUNTER — PATIENT MESSAGE (OUTPATIENT)
Dept: FAMILY MEDICINE CLINIC | Facility: CLINIC | Age: 66
End: 2021-05-06

## 2021-05-07 ENCOUNTER — OFFICE VISIT (OUTPATIENT)
Dept: FAMILY MEDICINE CLINIC | Facility: CLINIC | Age: 66
End: 2021-05-07
Payer: MEDICARE

## 2021-05-07 VITALS
SYSTOLIC BLOOD PRESSURE: 134 MMHG | HEART RATE: 96 BPM | RESPIRATION RATE: 20 BRPM | HEIGHT: 70 IN | BODY MASS INDEX: 32.21 KG/M2 | DIASTOLIC BLOOD PRESSURE: 72 MMHG | WEIGHT: 225 LBS

## 2021-05-07 DIAGNOSIS — I10 ESSENTIAL HYPERTENSION: ICD-10-CM

## 2021-05-07 DIAGNOSIS — R60.0 EDEMA OF RIGHT FOOT: Primary | ICD-10-CM

## 2021-05-07 DIAGNOSIS — Z79.899 MEDICATION MANAGEMENT: ICD-10-CM

## 2021-05-07 DIAGNOSIS — D17.22 BENIGN LIPOMATOUS NEOPLASM OF SKIN AND SUBCUTANEOUS TISSUE OF LEFT ARM: ICD-10-CM

## 2021-05-07 PROCEDURE — 99214 OFFICE O/P EST MOD 30 MIN: CPT | Performed by: FAMILY MEDICINE

## 2021-05-07 NOTE — PROGRESS NOTES
Verner Alken is a 77year old male. HPI:   Pt. Complains right foot swelling 3-4 days. Was not taking hydrochlorthiazide more recently due to low BP. Wearing catheter on the right leg. He has a lump on left arm he would like me to look at.    He i apply Misc, Please disregard previous qty on this. Pt uses up to 4x a day.  Pt uses 360 a month~, Disp: 400 each, Rfl: 1  Insulin Pen Needle (BD PEN NEEDLE SHORT U/F) 31G X 8 MM Does not apply Misc, Use with Levemir at night and Humalog 3 times daily depend comment: 1.0 pack weekends, only 4 cigs QD week days    Vaping Use      Vaping Use: Never used    Alcohol use:  Yes      Alcohol/week: 0.0 standard drinks      Comment: social/ 0 in last 4 mon    Drug use: No       Results for orders placed or performed in for this Visit:  Requested Prescriptions      No prescriptions requested or ordered in this encounter       Imaging & Consults:  None   Advised compression stocking. Advised to walk.   Advised to remove the catheter back from the right leg and elevate the

## 2021-05-07 NOTE — TELEPHONE ENCOUNTER
From: Anita Valentino  To: Tory Rdz DO  Sent: 5/6/2021 10:12 AM CDT  Subject: Non-Urgent Medical Question    My right foot is red and swollen. It started about 4 or 5 days ago but is a little worse this morning.  Started with a knot behind the secon

## 2021-05-07 NOTE — TELEPHONE ENCOUNTER
I called the pt this morning and his wife answered and they both agreed he can come in at 7:30 am this morning.

## 2021-05-12 ENCOUNTER — PROCEDURE (OUTPATIENT)
Dept: SURGERY | Facility: CLINIC | Age: 66
End: 2021-05-12
Payer: MEDICARE

## 2021-05-12 ENCOUNTER — TELEPHONE (OUTPATIENT)
Dept: SURGERY | Facility: CLINIC | Age: 66
End: 2021-05-12

## 2021-05-12 VITALS — DIASTOLIC BLOOD PRESSURE: 74 MMHG | HEART RATE: 102 BPM | SYSTOLIC BLOOD PRESSURE: 117 MMHG | TEMPERATURE: 98 F

## 2021-05-12 DIAGNOSIS — N13.8 BPH WITH OBSTRUCTION/LOWER URINARY TRACT SYMPTOMS: Primary | ICD-10-CM

## 2021-05-12 DIAGNOSIS — N13.30 BILATERAL HYDRONEPHROSIS: ICD-10-CM

## 2021-05-12 DIAGNOSIS — R33.9 URINARY RETENTION: ICD-10-CM

## 2021-05-12 DIAGNOSIS — N13.8 BPH WITH URINARY OBSTRUCTION: Primary | ICD-10-CM

## 2021-05-12 DIAGNOSIS — N40.1 BPH WITH URINARY OBSTRUCTION: Primary | ICD-10-CM

## 2021-05-12 DIAGNOSIS — N40.1 BPH WITH OBSTRUCTION/LOWER URINARY TRACT SYMPTOMS: Primary | ICD-10-CM

## 2021-05-12 DIAGNOSIS — R31.21 ASYMPTOMATIC MICROSCOPIC HEMATURIA: ICD-10-CM

## 2021-05-12 PROCEDURE — 52000 CYSTOURETHROSCOPY: CPT | Performed by: UROLOGY

## 2021-05-12 PROCEDURE — 99214 OFFICE O/P EST MOD 30 MIN: CPT | Performed by: UROLOGY

## 2021-05-12 PROCEDURE — 51798 US URINE CAPACITY MEASURE: CPT | Performed by: UROLOGY

## 2021-05-12 RX ORDER — CIPROFLOXACIN 500 MG/1
500 TABLET, FILM COATED ORAL ONCE
Status: DISCONTINUED | OUTPATIENT
Start: 2021-05-12 | End: 2021-07-08

## 2021-05-12 NOTE — TELEPHONE ENCOUNTER
Mary Edwards,  Could you please schedule this patient for surgery? Tentatively plan for 6/3. He needs clearance from General Surgery given air in gallbladder and is supposed to see them next Monday.  If he's able to be cleared sooner I told him we could potenti

## 2021-05-13 NOTE — TELEPHONE ENCOUNTER
Patient's wife called back this date and relates that they are seeing Dr. Araceli Langford for gallbladder concern on 5/17/21 and they would like to proceed with scheduling this for 6/3/21 and they will speak to him about clearance.   Informed her I would call her on

## 2021-05-13 NOTE — TELEPHONE ENCOUNTER
Called patient this date to schedule procedure. His wife stated he could not come to the phone and took my number.   Patient to call back when available at [phone]

## 2021-05-14 RX ORDER — SODIUM CHLORIDE, SODIUM LACTATE, POTASSIUM CHLORIDE, CALCIUM CHLORIDE 600; 310; 30; 20 MG/100ML; MG/100ML; MG/100ML; MG/100ML
INJECTION, SOLUTION INTRAVENOUS CONTINUOUS
Status: CANCELLED | OUTPATIENT
Start: 2021-05-14

## 2021-05-14 RX ORDER — ACETAMINOPHEN 500 MG
1000 TABLET ORAL ONCE
Status: CANCELLED | OUTPATIENT
Start: 2021-05-14 | End: 2021-05-14

## 2021-05-14 RX ORDER — DEXTROSE MONOHYDRATE 25 G/50ML
50 INJECTION, SOLUTION INTRAVENOUS
Status: CANCELLED | OUTPATIENT
Start: 2021-05-14

## 2021-05-16 RX ORDER — INSULIN ASPART 100 [IU]/ML
INJECTION, SOLUTION INTRAVENOUS; SUBCUTANEOUS
Qty: 60 ML | Refills: 1 | Status: SHIPPED | OUTPATIENT
Start: 2021-05-16 | End: 2021-05-22

## 2021-05-17 ENCOUNTER — OFFICE VISIT (OUTPATIENT)
Dept: SURGERY | Facility: CLINIC | Age: 66
End: 2021-05-17
Payer: MEDICARE

## 2021-05-17 ENCOUNTER — TELEPHONE (OUTPATIENT)
Dept: SURGERY | Facility: CLINIC | Age: 66
End: 2021-05-17

## 2021-05-17 VITALS
WEIGHT: 225 LBS | TEMPERATURE: 97 F | HEIGHT: 70 IN | SYSTOLIC BLOOD PRESSURE: 119 MMHG | BODY MASS INDEX: 32.21 KG/M2 | DIASTOLIC BLOOD PRESSURE: 75 MMHG | HEART RATE: 88 BPM

## 2021-05-17 DIAGNOSIS — E11.40 TYPE 2 DIABETES MELLITUS WITH DIABETIC NEUROPATHY, WITH LONG-TERM CURRENT USE OF INSULIN (HCC): ICD-10-CM

## 2021-05-17 DIAGNOSIS — I10 ESSENTIAL HYPERTENSION: ICD-10-CM

## 2021-05-17 DIAGNOSIS — J44.9 CHRONIC OBSTRUCTIVE PULMONARY DISEASE, UNSPECIFIED COPD TYPE (HCC): ICD-10-CM

## 2021-05-17 DIAGNOSIS — N40.1 BENIGN PROSTATIC HYPERPLASIA WITH LOWER URINARY TRACT SYMPTOMS, SYMPTOM DETAILS UNSPECIFIED: ICD-10-CM

## 2021-05-17 DIAGNOSIS — K80.20 GALLSTONES: ICD-10-CM

## 2021-05-17 DIAGNOSIS — K22.719 BARRETT'S ESOPHAGUS WITH DYSPLASIA: ICD-10-CM

## 2021-05-17 DIAGNOSIS — E10.9 INSULIN DEPENDENT DIABETES MELLITUS TYPE IA (HCC): ICD-10-CM

## 2021-05-17 DIAGNOSIS — F17.200 SMOKING: ICD-10-CM

## 2021-05-17 DIAGNOSIS — Z79.4 TYPE 2 DIABETES MELLITUS WITH DIABETIC NEUROPATHY, WITH LONG-TERM CURRENT USE OF INSULIN (HCC): ICD-10-CM

## 2021-05-17 DIAGNOSIS — R93.2 ABNORMAL FINDINGS ON DIAGNOSTIC IMAGING OF GALLBLADDER: Primary | ICD-10-CM

## 2021-05-17 PROCEDURE — 99215 OFFICE O/P EST HI 40 MIN: CPT | Performed by: COLON & RECTAL SURGERY

## 2021-05-17 RX ORDER — LEVOFLOXACIN 5 MG/ML
500 INJECTION, SOLUTION INTRAVENOUS EVERY 24 HOURS
Status: CANCELLED | OUTPATIENT
Start: 2021-05-17

## 2021-05-17 NOTE — TELEPHONE ENCOUNTER
Heather from [facility]. Called and since patient has Penicillin allergy, she wanted to make sure Dr. Liyl Kirk did not want to use Clindamycin or Gentamicin which is the hospital protocol for patients with Penicillin allergy.   Checked with Dr. Lily Kirk this d

## 2021-05-17 NOTE — H&P
New Patient Visit Note       Active Problems      1. Abnormal findings on diagnostic imaging of gallbladder    2. Insulin dependent diabetes mellitus type IA ([facility] [address]    3. Elizondo's esophagus with dysplasia    4.  Benign prostatic hyperplasia with lower urina was not the best examination in terms of resolution. The radiologist read a potential fistula between the gallbladder and duodenum.   I did not see evidence of this, but their interpretation is speculative based on the findings of air in the gallbladder an history, performing the examination, counseling and education, ordering tests, referring and communicating with other healthcare professionals, documenting clinical information, independently interpreting results, coordinating care, and communication of an History   Problem Relation Age of Onset   • Other (CHF[other]) Mother    • Diabetes Mother         Type II   • Diabetes Father         Type II   • Other (Other[other]) Father    • Stroke Father    • Other (Amputation[other]) Brother         leg below knee Ezetimibe-Simvastatin 10-40 MG Oral Tab, TAKE ONE TABLET BY MOUTH EVERY OTHER DAY, Disp: 45 tablet, Rfl: 1  •  Olmesartan Medoxomil 40 MG Oral Tab, Take 1 tablet (40 mg total) by mouth daily. , Disp: 90 tablet, Rfl: 1  •  hydrochlorothiazide 25 MG Oral Tab, and trouble swallowing. Respiratory: Negative for apnea, cough, shortness of breath and wheezing. Cardiovascular: Negative for chest pain, palpitations and leg swelling.    Gastrointestinal: Negative for abdominal distention, abdominal pain, anal blee present. Comments: Clinical exam currently reveals the abdomen to be soft, nondistended, nontender, good bowel sounds. No guarding or rebound. No masses. No ascites. There are no incisions on the abdomen. Liver and spleen are not palpable.   The p episode of jaundice, or dark urine. The patient's most recent EGD was August 2, 2019. The patient is known to have Elizondo's esophagitis with dysplasia. He is screened every 3 years. The patient was on baby aspirin, but stopped last Thursday.     Ana Gallegos prostate gland. The patient is walking around with a urinary catheter in place. The urologist is asking for clearance based on the findings of air in the gallbladder.     Clinical exam currently reveals the abdomen to be soft, nondistended, nontender, goo states he can get him on the schedule promptly. I did offer the patient to return to his normal gastroenterologist.  Although he has no problems with this individual, he would prefer to get another opinion by Dr. Leann Tatum.     I will see the patient back

## 2021-05-18 NOTE — PATIENT INSTRUCTIONS
I am seeing this patient in acute consultation for air within the gallbladder. The patient has not had any symptoms of biliary colic. He has no right upper quadrant abdominal pain. He has no chest pain. He has no radiation to the right shoulder.   He not see any pericholecystic fluid. There is no significant thickening of the gallbladder wall. There is no air in the hepatobiliary tree. There is no large gallstone which could be indicative of a gallstone ileus that has dropped into the duodenum.     Eli[employer] gallbladder. We know there are small stones based on CT scan examination, dedicated endoscopic ultrasound will tell us more information.   We are also as always concerned about a potential malignant fistula, however this is even less likely based on the de

## 2021-05-18 NOTE — TELEPHONE ENCOUNTER
Called patient this date and reviewed pre-op. Instructions with him. Also sent thru My Chart this date.   Patient was instructed to call with any questions at [phone]

## 2021-05-19 ENCOUNTER — EKG ENCOUNTER (OUTPATIENT)
Dept: LAB | Age: 66
End: 2021-05-19
Attending: UROLOGY
Payer: MEDICARE

## 2021-05-19 DIAGNOSIS — N13.8 BPH WITH URINARY OBSTRUCTION: ICD-10-CM

## 2021-05-19 DIAGNOSIS — N40.1 BPH WITH URINARY OBSTRUCTION: ICD-10-CM

## 2021-05-19 PROCEDURE — 93010 ELECTROCARDIOGRAM REPORT: CPT | Performed by: INTERNAL MEDICINE

## 2021-05-19 PROCEDURE — 93005 ELECTROCARDIOGRAM TRACING: CPT

## 2021-05-21 PROBLEM — R93.3 ABNORMAL FINDING ON GI TRACT IMAGING: Status: ACTIVE | Noted: 2021-05-21

## 2021-05-22 ENCOUNTER — ANESTHESIA EVENT (OUTPATIENT)
Dept: SURGERY | Facility: HOSPITAL | Age: 66
End: 2021-05-22
Payer: MEDICARE

## 2021-05-22 DIAGNOSIS — E11.40 TYPE 2 DIABETES MELLITUS WITH DIABETIC NEUROPATHY, WITH LONG-TERM CURRENT USE OF INSULIN (HCC): Primary | ICD-10-CM

## 2021-05-22 DIAGNOSIS — Z79.4 TYPE 2 DIABETES MELLITUS WITH DIABETIC NEUROPATHY, WITH LONG-TERM CURRENT USE OF INSULIN (HCC): Primary | ICD-10-CM

## 2021-05-22 RX ORDER — INSULIN ASPART 100 [IU]/ML
INJECTION, SOLUTION INTRAVENOUS; SUBCUTANEOUS
Qty: 60 ML | Refills: 1 | Status: SHIPPED | OUTPATIENT
Start: 2021-05-22

## 2021-05-24 ENCOUNTER — TELEPHONE (OUTPATIENT)
Dept: FAMILY MEDICINE CLINIC | Facility: CLINIC | Age: 66
End: 2021-05-24

## 2021-05-24 ENCOUNTER — OFFICE VISIT (OUTPATIENT)
Dept: FAMILY MEDICINE CLINIC | Facility: CLINIC | Age: 66
End: 2021-05-24
Payer: MEDICARE

## 2021-05-24 ENCOUNTER — LAB ENCOUNTER (OUTPATIENT)
Dept: LAB | Age: 66
End: 2021-05-24
Attending: INTERNAL MEDICINE
Payer: MEDICARE

## 2021-05-24 VITALS
OXYGEN SATURATION: 99 % | TEMPERATURE: 97 F | HEART RATE: 99 BPM | HEIGHT: 70 IN | WEIGHT: 223.25 LBS | SYSTOLIC BLOOD PRESSURE: 110 MMHG | RESPIRATION RATE: 16 BRPM | BODY MASS INDEX: 31.96 KG/M2 | DIASTOLIC BLOOD PRESSURE: 70 MMHG

## 2021-05-24 DIAGNOSIS — E55.9 VITAMIN D DEFICIENCY: ICD-10-CM

## 2021-05-24 DIAGNOSIS — K80.20 GALLSTONES: ICD-10-CM

## 2021-05-24 DIAGNOSIS — I35.0 MILD AORTIC STENOSIS: ICD-10-CM

## 2021-05-24 DIAGNOSIS — E78.5 DYSLIPIDEMIA: ICD-10-CM

## 2021-05-24 DIAGNOSIS — K57.30 DIVERTICULOSIS OF COLON: ICD-10-CM

## 2021-05-24 DIAGNOSIS — E11.40 TYPE 2 DIABETES MELLITUS WITH DIABETIC NEUROPATHY, WITH LONG-TERM CURRENT USE OF INSULIN (HCC): ICD-10-CM

## 2021-05-24 DIAGNOSIS — N40.1 BPH WITH URINARY OBSTRUCTION: Primary | ICD-10-CM

## 2021-05-24 DIAGNOSIS — J44.9 CHRONIC OBSTRUCTIVE PULMONARY DISEASE, UNSPECIFIED COPD TYPE (HCC): ICD-10-CM

## 2021-05-24 DIAGNOSIS — R93.3 ABNORMAL FINDING ON GI TRACT IMAGING: ICD-10-CM

## 2021-05-24 DIAGNOSIS — F17.200 SMOKING: ICD-10-CM

## 2021-05-24 DIAGNOSIS — Z79.4 TYPE 2 DIABETES MELLITUS WITH DIABETIC NEUROPATHY, WITH LONG-TERM CURRENT USE OF INSULIN (HCC): ICD-10-CM

## 2021-05-24 DIAGNOSIS — E66.9 DIABETES MELLITUS TYPE 2 IN OBESE (HCC): ICD-10-CM

## 2021-05-24 DIAGNOSIS — N13.8 BPH WITH URINARY OBSTRUCTION: Primary | ICD-10-CM

## 2021-05-24 DIAGNOSIS — G47.10 HYPERSOMNOLENCE: ICD-10-CM

## 2021-05-24 DIAGNOSIS — Z87.19 HISTORY OF HEMORRHOIDS: ICD-10-CM

## 2021-05-24 DIAGNOSIS — E11.69 DIABETES MELLITUS TYPE 2 IN OBESE (HCC): ICD-10-CM

## 2021-05-24 DIAGNOSIS — R06.83 SNORING: ICD-10-CM

## 2021-05-24 DIAGNOSIS — K22.719 BARRETT'S ESOPHAGUS WITH DYSPLASIA: ICD-10-CM

## 2021-05-24 DIAGNOSIS — I05.9 MITRAL VALVE ANNULAR CALCIFICATION: ICD-10-CM

## 2021-05-24 DIAGNOSIS — R06.81 APNEA: ICD-10-CM

## 2021-05-24 DIAGNOSIS — N40.1 BENIGN PROSTATIC HYPERPLASIA WITH LOWER URINARY TRACT SYMPTOMS, SYMPTOM DETAILS UNSPECIFIED: ICD-10-CM

## 2021-05-24 DIAGNOSIS — K44.9 DIAPHRAGMATIC HERNIA WITHOUT OBSTRUCTION AND WITHOUT GANGRENE: ICD-10-CM

## 2021-05-24 DIAGNOSIS — I35.9 AORTIC VALVE CALCIFICATION: ICD-10-CM

## 2021-05-24 DIAGNOSIS — Z87.39 HISTORY OF GOUT: ICD-10-CM

## 2021-05-24 DIAGNOSIS — I10 ESSENTIAL HYPERTENSION: ICD-10-CM

## 2021-05-24 DIAGNOSIS — Z79.899 MEDICATION MANAGEMENT: ICD-10-CM

## 2021-05-24 DIAGNOSIS — Z01.818 PRE-OP EXAMINATION: ICD-10-CM

## 2021-05-24 DIAGNOSIS — I25.10 CORONARY ARTERY DISEASE INVOLVING NATIVE CORONARY ARTERY OF NATIVE HEART WITHOUT ANGINA PECTORIS: ICD-10-CM

## 2021-05-24 PROCEDURE — 99215 OFFICE O/P EST HI 40 MIN: CPT | Performed by: FAMILY MEDICINE

## 2021-05-24 RX ORDER — FLUTICASONE PROPIONATE 50 MCG
2 SPRAY, SUSPENSION (ML) NASAL DAILY PRN
COMMUNITY

## 2021-05-24 NOTE — H&P
Sandra Stewart is a 74year old male who presents for a pre-operative physical exam. Patient is to have cystoscopy transurethral resection prostate, to be done by Dr. Fay Pryor at [facility] on 6/3/2021. HPI:   Pt complains of urinary obstruction.     Elverna Halsted total) by mouth 2 (two) times daily as needed for constipation. 30 capsule 0   • finasteride 5 MG Oral Tab Take 1 tablet (5 mg total) by mouth daily.  90 tablet 6   • Omega-3-acid Ethyl Esters 1 g Oral Cap TAKE TWO CAPSULES BY MOUTH TWICE A  capsule SURGICAL HISTORY  07/07/2008    Esophagogastroduodenscopy   • OTHER SURGICAL HISTORY  06/2010    Surgery for ablation of Elizondo's Esophagus   • OTHER SURGICAL HISTORY  01/2010    surgery for granular cell tumor   • OTHER SURGICAL HISTORY  2013    EGD;  depression or anxiety  HEMATOLOGIC: denies hx of anemia  ENDOCRINE: denies thyroid history  ALL/ASTHMA: denies hx of allergy or asthma    EXAM:   /70   Pulse 99   Temp 97.3 °F (36.3 °C) (Temporal)   Resp 16   Ht 5' 10\" (1.778 m)   Wt 223 lb 4 oz (10 colon  Diaphragmatic hernia without obstruction and without gangrene  History of hemorrhoids  Gallstones  Smoking  Medication management    No orders of the defined types were placed in this encounter.       Meds & Refills for this Visit:  Requested Mendel Punches

## 2021-05-25 ENCOUNTER — ANESTHESIA EVENT (OUTPATIENT)
Dept: ENDOSCOPY | Facility: HOSPITAL | Age: 66
End: 2021-05-25
Payer: MEDICARE

## 2021-05-25 ENCOUNTER — MED REC SCAN ONLY (OUTPATIENT)
Dept: FAMILY MEDICINE CLINIC | Facility: CLINIC | Age: 66
End: 2021-05-25

## 2021-05-26 ENCOUNTER — HOSPITAL ENCOUNTER (OUTPATIENT)
Dept: NUCLEAR MEDICINE | Facility: HOSPITAL | Age: 66
Discharge: HOME | End: 2021-05-26
Attending: INTERNAL MEDICINE
Payer: MEDICARE

## 2021-05-26 DIAGNOSIS — R93.3 ABNORMAL FINDING ON GI TRACT IMAGING: ICD-10-CM

## 2021-05-26 PROCEDURE — 78226 HEPATOBILIARY SYSTEM IMAGING: CPT | Performed by: INTERNAL MEDICINE

## 2021-05-26 RX ORDER — MORPHINE SULFATE 2 MG/ML
INJECTION, SOLUTION INTRAMUSCULAR; INTRAVENOUS
Status: DISPENSED
Start: 2021-05-26 | End: 2021-05-26

## 2021-05-27 ENCOUNTER — ANESTHESIA (OUTPATIENT)
Dept: ENDOSCOPY | Facility: HOSPITAL | Age: 66
End: 2021-05-27
Payer: MEDICARE

## 2021-05-27 ENCOUNTER — HOSPITAL ENCOUNTER (OUTPATIENT)
Facility: HOSPITAL | Age: 66
Setting detail: HOSPITAL OUTPATIENT SURGERY
Discharge: HOME | End: 2021-05-27
Attending: INTERNAL MEDICINE | Admitting: INTERNAL MEDICINE
Payer: MEDICARE

## 2021-05-27 VITALS
RESPIRATION RATE: 18 BRPM | TEMPERATURE: 98 F | OXYGEN SATURATION: 99 % | WEIGHT: 220 LBS | SYSTOLIC BLOOD PRESSURE: 119 MMHG | HEIGHT: 70 IN | HEART RATE: 69 BPM | DIASTOLIC BLOOD PRESSURE: 59 MMHG | BODY MASS INDEX: 31.5 KG/M2

## 2021-05-27 DIAGNOSIS — R93.3 ABNORMAL FINDING ON GI TRACT IMAGING: Primary | ICD-10-CM

## 2021-05-27 PROCEDURE — 0DJ08ZZ INSPECTION OF UPPER INTESTINAL TRACT, VIA NATURAL OR ARTIFICIAL OPENING ENDOSCOPIC: ICD-10-PCS | Performed by: INTERNAL MEDICINE

## 2021-05-27 PROCEDURE — 82962 GLUCOSE BLOOD TEST: CPT

## 2021-05-27 RX ORDER — DEXTROSE MONOHYDRATE 25 G/50ML
50 INJECTION, SOLUTION INTRAVENOUS
Status: DISCONTINUED | OUTPATIENT
Start: 2021-05-27 | End: 2021-05-27

## 2021-05-27 RX ORDER — NALOXONE HYDROCHLORIDE 0.4 MG/ML
80 INJECTION, SOLUTION INTRAMUSCULAR; INTRAVENOUS; SUBCUTANEOUS AS NEEDED
Status: DISCONTINUED | OUTPATIENT
Start: 2021-05-27 | End: 2021-05-27

## 2021-05-27 RX ORDER — LIDOCAINE HYDROCHLORIDE 10 MG/ML
INJECTION, SOLUTION EPIDURAL; INFILTRATION; INTRACAUDAL; PERINEURAL AS NEEDED
Status: DISCONTINUED | OUTPATIENT
Start: 2021-05-27 | End: 2021-05-27 | Stop reason: SURG

## 2021-05-27 RX ORDER — SODIUM CHLORIDE, SODIUM LACTATE, POTASSIUM CHLORIDE, CALCIUM CHLORIDE 600; 310; 30; 20 MG/100ML; MG/100ML; MG/100ML; MG/100ML
INJECTION, SOLUTION INTRAVENOUS CONTINUOUS
Status: DISCONTINUED | OUTPATIENT
Start: 2021-05-27 | End: 2021-05-27

## 2021-05-27 RX ADMIN — SODIUM CHLORIDE, SODIUM LACTATE, POTASSIUM CHLORIDE, CALCIUM CHLORIDE: 600; 310; 30; 20 INJECTION, SOLUTION INTRAVENOUS at 16:22:00

## 2021-05-27 RX ADMIN — LIDOCAINE HYDROCHLORIDE 100 MG: 10 INJECTION, SOLUTION EPIDURAL; INFILTRATION; INTRACAUDAL; PERINEURAL at 16:26:00

## 2021-05-27 NOTE — ANESTHESIA PREPROCEDURE EVALUATION
PRE-OP EVALUATION    Patient Name: Deangelo Egan    Admit Diagnosis: Abnormal finding on GI tract imaging [R93.3]    Pre-op Diagnosis: Abnormal finding on GI tract imaging [R93.3]    ESOPHAGOGASTRODUODENOSCOPY (EGD), ENDOSCOPIC ULTRASOUND (EUS)    Ane Disp: 45 tablet, Rfl: 1, 5/25/2021 at Unknown time  Olmesartan Medoxomil 40 MG Oral Tab, Take 1 tablet (40 mg total) by mouth daily. , Disp: 90 tablet, Rfl: 1, 5/26/2021 at Unknown time  hydrochlorothiazide 25 MG Oral Tab, Take 1 tablet (25 mg total) by julia Disp: , Rfl:         Allergies: Dander, Penicillins, and Tetanus Toxoids      Anesthesia Evaluation    Patient summary reviewed.     Anesthetic Complications  (-) history of anesthetic complications         GI/Hepatic/Renal  Comment: Elizondo's  BPH    (+) G  (H) 03/19/2021    CA 9.2 03/19/2021            Airway      Mallampati: III  Mouth opening: >3 FB  TM distance: 4 - 6 cm  Neck ROM: full Cardiovascular             Dental    No notable dental history.          Pulmonary                     Other f

## 2021-05-27 NOTE — H&P
[address] [facility] Patient Status:  Hospital Outpatient Surgery    5/10/1955 MRN [id number]   Location [facility] Attending Jacquelyn Skaggs MD   Date 2021 PCP Yady Meraz DO     CC:  Air with 06/2010    Surgery for ablation of Elizondo's Esophagus   • OTHER SURGICAL HISTORY  01/2010    surgery for granular cell tumor   • OTHER SURGICAL HISTORY  2013    EGD; Dr. Lo Barillas; due in 2 years   • OTHER SURGICAL HISTORY N/A 7/2/2015    Procedure: Jerod Persuad gastroduodenitis     Family history of malignant neoplasm of gastrointestinal tract     Esophagitis     Diverticulosis of colon     Benign neoplasm of colon     Coronary artery disease involving native coronary artery of native heart without angina pectori

## 2021-05-27 NOTE — ANESTHESIA POSTPROCEDURE EVALUATION
[address] [facility] Patient Status:  Hospital Outpatient Surgery   Age/Gender 74year old male MRN [id number]   Location [address] [place] [address] Attending Juanita Darden MD   Hosp Day # 0 PCP Chris Morocho DO       Anesth

## 2021-06-01 ENCOUNTER — OFFICE VISIT (OUTPATIENT)
Dept: SURGERY | Facility: CLINIC | Age: 66
End: 2021-06-01
Payer: MEDICARE

## 2021-06-01 ENCOUNTER — LAB ENCOUNTER (OUTPATIENT)
Dept: LAB | Age: 66
End: 2021-06-01
Attending: UROLOGY
Payer: MEDICARE

## 2021-06-01 VITALS
SYSTOLIC BLOOD PRESSURE: 108 MMHG | TEMPERATURE: 98 F | WEIGHT: 220 LBS | DIASTOLIC BLOOD PRESSURE: 66 MMHG | HEIGHT: 70 IN | BODY MASS INDEX: 31.5 KG/M2 | HEART RATE: 79 BPM

## 2021-06-01 DIAGNOSIS — I05.9 MITRAL VALVE ANNULAR CALCIFICATION: ICD-10-CM

## 2021-06-01 DIAGNOSIS — E10.9 INSULIN DEPENDENT DIABETES MELLITUS TYPE IA (HCC): ICD-10-CM

## 2021-06-01 DIAGNOSIS — Z79.4 TYPE 2 DIABETES MELLITUS WITH DIABETIC NEUROPATHY, WITH LONG-TERM CURRENT USE OF INSULIN (HCC): ICD-10-CM

## 2021-06-01 DIAGNOSIS — E11.40 TYPE 2 DIABETES MELLITUS WITH DIABETIC NEUROPATHY, WITH LONG-TERM CURRENT USE OF INSULIN (HCC): ICD-10-CM

## 2021-06-01 DIAGNOSIS — I35.0 MILD AORTIC STENOSIS: ICD-10-CM

## 2021-06-01 DIAGNOSIS — N40.1 BPH WITH URINARY OBSTRUCTION: ICD-10-CM

## 2021-06-01 DIAGNOSIS — R93.3 ABNORMAL FINDING ON GI TRACT IMAGING: ICD-10-CM

## 2021-06-01 DIAGNOSIS — K57.30 DIVERTICULOSIS OF COLON: ICD-10-CM

## 2021-06-01 DIAGNOSIS — I35.9 AORTIC VALVE CALCIFICATION: ICD-10-CM

## 2021-06-01 DIAGNOSIS — K22.719 BARRETT'S ESOPHAGUS WITH DYSPLASIA: ICD-10-CM

## 2021-06-01 DIAGNOSIS — L98.8 FISTULA: ICD-10-CM

## 2021-06-01 DIAGNOSIS — J44.9 CHRONIC OBSTRUCTIVE PULMONARY DISEASE, UNSPECIFIED COPD TYPE (HCC): ICD-10-CM

## 2021-06-01 DIAGNOSIS — N13.8 BPH WITH URINARY OBSTRUCTION: ICD-10-CM

## 2021-06-01 DIAGNOSIS — R93.2 ABNORMAL FINDINGS ON DIAGNOSTIC IMAGING OF GALLBLADDER: ICD-10-CM

## 2021-06-01 DIAGNOSIS — K82.3 CHOLECYSTODUODENAL FISTULA: Primary | ICD-10-CM

## 2021-06-01 PROCEDURE — 99215 OFFICE O/P EST HI 40 MIN: CPT | Performed by: COLON & RECTAL SURGERY

## 2021-06-01 RX ORDER — ACETAMINOPHEN 500 MG
1000 TABLET ORAL ONCE
Status: CANCELLED | OUTPATIENT
Start: 2021-06-01 | End: 2021-06-01

## 2021-06-01 NOTE — PROGRESS NOTES
Follow Up Visit Note       Active Problems      1. Cholecystoduodenal fistula    2. Fistula    3. Abnormal findings on diagnostic imaging of gallbladder    4. Abnormal finding on GI tract imaging    5.  Insulin dependent diabetes mellitus type IA ([facility] [address]    6 obtained on April 19, 2021. The oral contrast did not go from the duodenum into the gallbladder. Although air may be transiting, it does not appear that barium or oral contrast is transiting.     This patient is going for a TURP of the prostate based on a wife for a few years   • Type II or unspecified type diabetes mellitus without mention of complication, not stated as uncontrolled    • Unspecified essential hypertension    • Unspecified gastritis and gastroduodenitis without mention of hemorrhage    • We only 4 cigs QD week days    Vaping Use      Vaping Use: Never used    Substance and Sexual Activity      Alcohol use: Not Currently        Alcohol/week: 0.0 standard drinks        Comment: social/ 0 in last 4 mon      Drug use: No      Sexual activity: Yes times daily. Use as directed., Disp: 400 each, Rfl: 3  •  Tiotropium Bromide Monohydrate (SPIRIVA HANDIHALER) 18 MCG Inhalation Cap, Inhale 1 capsule (18 mcg total) into the lungs daily. , Disp: 90 capsule, Rfl: 1  •  OMEPRAZOLE OR, Take 20 mg by mouth rebecca Pulse 79   Temp 98.2 °F (36.8 °C) (Temporal)   Ht 70\"   Wt 220 lb (99.8 kg)   BMI 31.57 kg/m²   Physical Exam  Vitals and nursing note reviewed.    Abdominal:      Comments: Clinical exam today reveals his abdomen to be soft, nondistended, nontender, good in the right upper quadrant. The patient has had no prior abdominal operations. CT scan of the abdomen with oral contrast was obtained on April 19, 2021. The oral contrast did not go from the duodenum into the gallbladder.   Although air may be trans file.    Venu Harris MD

## 2021-06-01 NOTE — PATIENT INSTRUCTIONS
This patient was undergoing CT angiogram of the chest for pulmonary symptoms. He was noted to have air within the gallbladder. This has led to a consultation by me. I discussed case with Dr. Lashell Sibley.   He performed endoscopic ultrasound of the entir have cholecystoduodenal fistulas with gallstone ileus. Even dropping a huge gallstone through the duodenum is not an indication for cholecystectomy. An asymptomatic cholecystoduodenal fistula is not an indication for surgery.     Despite this, I do recomm

## 2021-06-02 ENCOUNTER — TELEPHONE (OUTPATIENT)
Dept: SURGERY | Facility: CLINIC | Age: 66
End: 2021-06-02

## 2021-06-03 ENCOUNTER — ANESTHESIA (OUTPATIENT)
Dept: SURGERY | Facility: HOSPITAL | Age: 66
End: 2021-06-03
Payer: MEDICARE

## 2021-06-03 ENCOUNTER — HOSPITAL ENCOUNTER (OUTPATIENT)
Facility: HOSPITAL | Age: 66
Discharge: HOME | End: 2021-06-04
Attending: UROLOGY | Admitting: UROLOGY
Payer: MEDICARE

## 2021-06-03 DIAGNOSIS — N13.8 BPH WITH URINARY OBSTRUCTION: Primary | ICD-10-CM

## 2021-06-03 DIAGNOSIS — N40.1 BPH WITH URINARY OBSTRUCTION: Primary | ICD-10-CM

## 2021-06-03 PROCEDURE — 99204 OFFICE O/P NEW MOD 45 MIN: CPT | Performed by: HOSPITALIST

## 2021-06-03 PROCEDURE — 0VB08ZZ EXCISION OF PROSTATE, VIA NATURAL OR ARTIFICIAL OPENING ENDOSCOPIC: ICD-10-PCS | Performed by: UROLOGY

## 2021-06-03 RX ORDER — NICOTINE 21 MG/24HR
1 PATCH, TRANSDERMAL 24 HOURS TRANSDERMAL DAILY
Status: DISCONTINUED | OUTPATIENT
Start: 2021-06-03 | End: 2021-06-04

## 2021-06-03 RX ORDER — DOCUSATE SODIUM 100 MG/1
100 CAPSULE, LIQUID FILLED ORAL 2 TIMES DAILY
Status: DISCONTINUED | OUTPATIENT
Start: 2021-06-03 | End: 2021-06-04

## 2021-06-03 RX ORDER — PANTOPRAZOLE SODIUM 20 MG/1
20 TABLET, DELAYED RELEASE ORAL
Status: DISCONTINUED | OUTPATIENT
Start: 2021-06-04 | End: 2021-06-04

## 2021-06-03 RX ORDER — DEXTROSE MONOHYDRATE 25 G/50ML
50 INJECTION, SOLUTION INTRAVENOUS
Status: DISCONTINUED | OUTPATIENT
Start: 2021-06-03 | End: 2021-06-03 | Stop reason: HOSPADM

## 2021-06-03 RX ORDER — LEVOFLOXACIN 500 MG/1
500 TABLET, FILM COATED ORAL DAILY
Status: DISCONTINUED | OUTPATIENT
Start: 2021-06-04 | End: 2021-06-04

## 2021-06-03 RX ORDER — FLUTICASONE PROPIONATE 50 MCG
2 SPRAY, SUSPENSION (ML) NASAL DAILY
Status: DISCONTINUED | OUTPATIENT
Start: 2021-06-03 | End: 2021-06-04

## 2021-06-03 RX ORDER — ACETAMINOPHEN 500 MG
1000 TABLET ORAL EVERY 8 HOURS SCHEDULED
Status: DISCONTINUED | OUTPATIENT
Start: 2021-06-03 | End: 2021-06-04

## 2021-06-03 RX ORDER — SODIUM CHLORIDE, SODIUM LACTATE, POTASSIUM CHLORIDE, CALCIUM CHLORIDE 600; 310; 30; 20 MG/100ML; MG/100ML; MG/100ML; MG/100ML
INJECTION, SOLUTION INTRAVENOUS CONTINUOUS
Status: DISCONTINUED | OUTPATIENT
Start: 2021-06-03 | End: 2021-06-03 | Stop reason: HOSPADM

## 2021-06-03 RX ORDER — HYDROMORPHONE HYDROCHLORIDE 1 MG/ML
0.8 INJECTION, SOLUTION INTRAMUSCULAR; INTRAVENOUS; SUBCUTANEOUS EVERY 2 HOUR PRN
Status: DISCONTINUED | OUTPATIENT
Start: 2021-06-03 | End: 2021-06-04

## 2021-06-03 RX ORDER — LEVOFLOXACIN 5 MG/ML
500 INJECTION, SOLUTION INTRAVENOUS ONCE
Status: COMPLETED | OUTPATIENT
Start: 2021-06-03 | End: 2021-06-03

## 2021-06-03 RX ORDER — TAMSULOSIN HYDROCHLORIDE 0.4 MG/1
0.8 CAPSULE ORAL DAILY
Status: DISCONTINUED | OUTPATIENT
Start: 2021-06-03 | End: 2021-06-04

## 2021-06-03 RX ORDER — ROCURONIUM BROMIDE 10 MG/ML
INJECTION, SOLUTION INTRAVENOUS AS NEEDED
Status: DISCONTINUED | OUTPATIENT
Start: 2021-06-03 | End: 2021-06-03 | Stop reason: SURG

## 2021-06-03 RX ORDER — OLMESARTAN MEDOXOMIL 40 MG/1
40 TABLET ORAL DAILY
COMMUNITY
End: 2021-06-07

## 2021-06-03 RX ORDER — OXYCODONE HYDROCHLORIDE 5 MG/1
5 TABLET ORAL EVERY 4 HOURS PRN
Status: DISCONTINUED | OUTPATIENT
Start: 2021-06-03 | End: 2021-06-04

## 2021-06-03 RX ORDER — HYDROCODONE BITARTRATE AND ACETAMINOPHEN 5; 325 MG/1; MG/1
1 TABLET ORAL AS NEEDED
Status: DISCONTINUED | OUTPATIENT
Start: 2021-06-03 | End: 2021-06-03 | Stop reason: HOSPADM

## 2021-06-03 RX ORDER — HYDROCODONE BITARTRATE AND ACETAMINOPHEN 5; 325 MG/1; MG/1
1 TABLET ORAL EVERY 6 HOURS PRN
Qty: 30 TABLET | Refills: 0 | Status: SHIPPED | OUTPATIENT
Start: 2021-06-03 | End: 2021-07-08

## 2021-06-03 RX ORDER — DEXTROSE MONOHYDRATE 25 G/50ML
50 INJECTION, SOLUTION INTRAVENOUS
Status: DISCONTINUED | OUTPATIENT
Start: 2021-06-03 | End: 2021-06-04

## 2021-06-03 RX ORDER — ONDANSETRON 2 MG/ML
4 INJECTION INTRAMUSCULAR; INTRAVENOUS AS NEEDED
Status: DISCONTINUED | OUTPATIENT
Start: 2021-06-03 | End: 2021-06-03 | Stop reason: HOSPADM

## 2021-06-03 RX ORDER — DIPHENHYDRAMINE HYDROCHLORIDE 50 MG/ML
12.5 INJECTION INTRAMUSCULAR; INTRAVENOUS NIGHTLY PRN
Status: DISCONTINUED | OUTPATIENT
Start: 2021-06-03 | End: 2021-06-04

## 2021-06-03 RX ORDER — OMEPRAZOLE 20 MG/1
20 CAPSULE, DELAYED RELEASE ORAL
COMMUNITY

## 2021-06-03 RX ORDER — ONDANSETRON 2 MG/ML
INJECTION INTRAMUSCULAR; INTRAVENOUS AS NEEDED
Status: DISCONTINUED | OUTPATIENT
Start: 2021-06-03 | End: 2021-06-03 | Stop reason: SURG

## 2021-06-03 RX ORDER — SODIUM CHLORIDE 9 MG/ML
INJECTION, SOLUTION INTRAVENOUS CONTINUOUS
Status: DISCONTINUED | OUTPATIENT
Start: 2021-06-03 | End: 2021-06-04

## 2021-06-03 RX ORDER — OXYCODONE HYDROCHLORIDE 10 MG/1
10 TABLET ORAL EVERY 4 HOURS PRN
Status: DISCONTINUED | OUTPATIENT
Start: 2021-06-03 | End: 2021-06-04

## 2021-06-03 RX ORDER — INSULIN ASPART 100 [IU]/ML
INJECTION, SOLUTION INTRAVENOUS; SUBCUTANEOUS ONCE
Status: DISCONTINUED | OUTPATIENT
Start: 2021-06-03 | End: 2021-06-03 | Stop reason: HOSPADM

## 2021-06-03 RX ORDER — EPHEDRINE SULFATE 50 MG/ML
INJECTION INTRAVENOUS AS NEEDED
Status: DISCONTINUED | OUTPATIENT
Start: 2021-06-03 | End: 2021-06-03 | Stop reason: SURG

## 2021-06-03 RX ORDER — LEVOFLOXACIN 5 MG/ML
500 INJECTION, SOLUTION INTRAVENOUS DAILY
Status: DISCONTINUED | OUTPATIENT
Start: 2021-06-04 | End: 2021-06-04

## 2021-06-03 RX ORDER — HYDROCODONE BITARTRATE AND ACETAMINOPHEN 5; 325 MG/1; MG/1
2 TABLET ORAL AS NEEDED
Status: DISCONTINUED | OUTPATIENT
Start: 2021-06-03 | End: 2021-06-03 | Stop reason: HOSPADM

## 2021-06-03 RX ORDER — ENOXAPARIN SODIUM 100 MG/ML
40 INJECTION SUBCUTANEOUS NIGHTLY
Status: DISCONTINUED | OUTPATIENT
Start: 2021-06-03 | End: 2021-06-04

## 2021-06-03 RX ORDER — HYDROMORPHONE HYDROCHLORIDE 1 MG/ML
0.4 INJECTION, SOLUTION INTRAMUSCULAR; INTRAVENOUS; SUBCUTANEOUS EVERY 5 MIN PRN
Status: DISCONTINUED | OUTPATIENT
Start: 2021-06-03 | End: 2021-06-03 | Stop reason: HOSPADM

## 2021-06-03 RX ORDER — NALOXONE HYDROCHLORIDE 0.4 MG/ML
80 INJECTION, SOLUTION INTRAMUSCULAR; INTRAVENOUS; SUBCUTANEOUS AS NEEDED
Status: DISCONTINUED | OUTPATIENT
Start: 2021-06-03 | End: 2021-06-03 | Stop reason: HOSPADM

## 2021-06-03 RX ORDER — DEXAMETHASONE SODIUM PHOSPHATE 4 MG/ML
VIAL (ML) INJECTION AS NEEDED
Status: DISCONTINUED | OUTPATIENT
Start: 2021-06-03 | End: 2021-06-03 | Stop reason: SURG

## 2021-06-03 RX ORDER — MEPERIDINE HYDROCHLORIDE 25 MG/ML
12.5 INJECTION INTRAMUSCULAR; INTRAVENOUS; SUBCUTANEOUS AS NEEDED
Status: DISCONTINUED | OUTPATIENT
Start: 2021-06-03 | End: 2021-06-03 | Stop reason: HOSPADM

## 2021-06-03 RX ORDER — HYDROMORPHONE HYDROCHLORIDE 1 MG/ML
INJECTION, SOLUTION INTRAMUSCULAR; INTRAVENOUS; SUBCUTANEOUS
Status: COMPLETED
Start: 2021-06-03 | End: 2021-06-03

## 2021-06-03 RX ORDER — FINASTERIDE 5 MG/1
5 TABLET, FILM COATED ORAL DAILY
Status: DISCONTINUED | OUTPATIENT
Start: 2021-06-03 | End: 2021-06-04

## 2021-06-03 RX ORDER — ONDANSETRON 2 MG/ML
4 INJECTION INTRAMUSCULAR; INTRAVENOUS EVERY 6 HOURS PRN
Status: DISCONTINUED | OUTPATIENT
Start: 2021-06-03 | End: 2021-06-04

## 2021-06-03 RX ORDER — DIPHENHYDRAMINE HCL 25 MG
25 CAPSULE ORAL NIGHTLY PRN
Status: DISCONTINUED | OUTPATIENT
Start: 2021-06-03 | End: 2021-06-04

## 2021-06-03 RX ORDER — NEOSTIGMINE METHYLSULFATE 1 MG/ML
INJECTION INTRAVENOUS AS NEEDED
Status: DISCONTINUED | OUTPATIENT
Start: 2021-06-03 | End: 2021-06-03 | Stop reason: SURG

## 2021-06-03 RX ORDER — LOSARTAN POTASSIUM 100 MG/1
100 TABLET ORAL DAILY
Status: DISCONTINUED | OUTPATIENT
Start: 2021-06-03 | End: 2021-06-04

## 2021-06-03 RX ORDER — DIPHENHYDRAMINE HYDROCHLORIDE 50 MG/ML
12.5 INJECTION INTRAMUSCULAR; INTRAVENOUS AS NEEDED
Status: DISCONTINUED | OUTPATIENT
Start: 2021-06-03 | End: 2021-06-03 | Stop reason: HOSPADM

## 2021-06-03 RX ORDER — HYDROCHLOROTHIAZIDE 25 MG/1
25 TABLET ORAL DAILY
Status: DISCONTINUED | OUTPATIENT
Start: 2021-06-04 | End: 2021-06-04

## 2021-06-03 RX ORDER — DOCUSATE SODIUM 100 MG/1
100 CAPSULE, LIQUID FILLED ORAL 2 TIMES DAILY PRN
Qty: 30 CAPSULE | Refills: 0 | Status: SHIPPED | OUTPATIENT
Start: 2021-06-03 | End: 2021-10-25 | Stop reason: ALTCHOICE

## 2021-06-03 RX ORDER — PHENYLEPHRINE HCL 10 MG/ML
VIAL (ML) INJECTION AS NEEDED
Status: DISCONTINUED | OUTPATIENT
Start: 2021-06-03 | End: 2021-06-03 | Stop reason: SURG

## 2021-06-03 RX ORDER — LIDOCAINE HYDROCHLORIDE 20 MG/ML
JELLY TOPICAL AS NEEDED
Status: DISCONTINUED | OUTPATIENT
Start: 2021-06-03 | End: 2021-06-03 | Stop reason: HOSPADM

## 2021-06-03 RX ORDER — ONDANSETRON 4 MG/1
4 TABLET, ORALLY DISINTEGRATING ORAL EVERY 6 HOURS PRN
Status: DISCONTINUED | OUTPATIENT
Start: 2021-06-03 | End: 2021-06-04

## 2021-06-03 RX ORDER — EZETIMIBE AND SIMVASTATIN 10; 40 MG/1; MG/1
1 TABLET ORAL EVERY OTHER DAY
COMMUNITY
End: 2021-08-03

## 2021-06-03 RX ORDER — LIDOCAINE HYDROCHLORIDE 10 MG/ML
INJECTION, SOLUTION EPIDURAL; INFILTRATION; INTRACAUDAL; PERINEURAL AS NEEDED
Status: DISCONTINUED | OUTPATIENT
Start: 2021-06-03 | End: 2021-06-03 | Stop reason: SURG

## 2021-06-03 RX ORDER — NICOTINE 21 MG/24HR
1 PATCH, TRANSDERMAL 24 HOURS TRANSDERMAL DAILY
Status: DISCONTINUED | OUTPATIENT
Start: 2021-06-03 | End: 2021-06-03

## 2021-06-03 RX ORDER — CIPROFLOXACIN 500 MG/1
500 TABLET, FILM COATED ORAL 2 TIMES DAILY
Qty: 8 TABLET | Refills: 0 | Status: SHIPPED | OUTPATIENT
Start: 2021-06-03 | End: 2021-06-07

## 2021-06-03 RX ORDER — HYDROMORPHONE HYDROCHLORIDE 1 MG/ML
0.4 INJECTION, SOLUTION INTRAMUSCULAR; INTRAVENOUS; SUBCUTANEOUS EVERY 2 HOUR PRN
Status: DISCONTINUED | OUTPATIENT
Start: 2021-06-03 | End: 2021-06-04

## 2021-06-03 RX ORDER — GLYCOPYRROLATE 0.2 MG/ML
INJECTION, SOLUTION INTRAMUSCULAR; INTRAVENOUS AS NEEDED
Status: DISCONTINUED | OUTPATIENT
Start: 2021-06-03 | End: 2021-06-03 | Stop reason: SURG

## 2021-06-03 RX ADMIN — EPHEDRINE SULFATE 5 MG: 50 INJECTION INTRAVENOUS at 09:51:00

## 2021-06-03 RX ADMIN — SODIUM CHLORIDE, SODIUM LACTATE, POTASSIUM CHLORIDE, CALCIUM CHLORIDE: 600; 310; 30; 20 INJECTION, SOLUTION INTRAVENOUS at 11:38:00

## 2021-06-03 RX ADMIN — ROCURONIUM BROMIDE 20 MG: 10 INJECTION, SOLUTION INTRAVENOUS at 10:28:00

## 2021-06-03 RX ADMIN — PHENYLEPHRINE HCL 50 MCG: 10 MG/ML VIAL (ML) INJECTION at 09:56:00

## 2021-06-03 RX ADMIN — NEOSTIGMINE METHYLSULFATE 5 MG: 1 INJECTION INTRAVENOUS at 11:25:00

## 2021-06-03 RX ADMIN — GLYCOPYRROLATE 0.8 MG: 0.2 INJECTION, SOLUTION INTRAMUSCULAR; INTRAVENOUS at 11:25:00

## 2021-06-03 RX ADMIN — ONDANSETRON 4 MG: 2 INJECTION INTRAMUSCULAR; INTRAVENOUS at 11:25:00

## 2021-06-03 RX ADMIN — ROCURONIUM BROMIDE 50 MG: 10 INJECTION, SOLUTION INTRAVENOUS at 09:06:00

## 2021-06-03 RX ADMIN — PHENYLEPHRINE HCL 50 MCG: 10 MG/ML VIAL (ML) INJECTION at 09:20:00

## 2021-06-03 RX ADMIN — ROCURONIUM BROMIDE 20 MG: 10 INJECTION, SOLUTION INTRAVENOUS at 10:00:00

## 2021-06-03 RX ADMIN — DEXAMETHASONE SODIUM PHOSPHATE 4 MG: 4 MG/ML VIAL (ML) INJECTION at 09:16:00

## 2021-06-03 RX ADMIN — EPHEDRINE SULFATE 10 MG: 50 INJECTION INTRAVENOUS at 09:54:00

## 2021-06-03 RX ADMIN — LIDOCAINE HYDROCHLORIDE 50 MG: 10 INJECTION, SOLUTION EPIDURAL; INFILTRATION; INTRACAUDAL; PERINEURAL at 09:06:00

## 2021-06-03 RX ADMIN — LEVOFLOXACIN 500 MG: 5 INJECTION, SOLUTION INTRAVENOUS at 09:24:00

## 2021-06-03 NOTE — ANESTHESIA PROCEDURE NOTES
Airway  Date/Time: 6/3/2021 9:08 AM  Urgency: elective    Airway not difficult    General Information and Staff    Patient location during procedure: OR  Anesthesiologist: Sanjay Cornelius MD  Resident/CRNA: Eric Benz CRNA  Performed: CRNA     Indication

## 2021-06-03 NOTE — CONSULTS
[facility]  [address] [facility] Patient Status:  Outpatient in a Bed    5/10/1955 MRN [id number]   [facility] 3NW-A Attending Kena Jacobs MD   Hosp Day # 0 PCP Aime Herman DO     Reason for consult: Medical managem unspecified(301) 2014   • Sleep apnea Noticed by wife for a few years   • Type II or unspecified type diabetes mellitus without mention of complication, not stated as uncontrolled    • Unspecified essential hypertension    • Unspecified gastritis and gastr prior to encounter. Acetaminophen (ACETAMINOPHEN EXTRA STRENGTH) 500 MG Oral Cap, Take 1,000 mg by mouth one time.   , Disp: , Rfl:   Ezetimibe-Simvastatin 10-40 MG Oral Tab, Take 1 tablet by mouth nightly., Disp: , Rfl:   omeprazole 20 MG Oral Capsule Del (BD PEN NEEDLE SHORT U/F) 31G X 8 MM Does not apply Misc, Please disregard previous qty on this. Pt uses up to 4x a day.  Pt uses 360 a month~, Disp: 400 each, Rfl: 1  Insulin Pen Needle (BD PEN NEEDLE SHORT U/F) 31G X 8 MM Does not apply Misc, Use with Kayla Barillas 06/01/2021    COVID19 Not Detected 05/24/2021       Pro-Calcitonin  No results for input(s): PCT in the last 168 hours. Cardiac  No results for input(s): TROP, PBNP in the last 168 hours.     Creatinine Kinase  No results for input(s): CK in the last 168

## 2021-06-03 NOTE — PLAN OF CARE
Problem: Patient/Family Goals  Goal: Patient/Family Long Term Goal  Description: Patient's Long Term Goal: go home    Interventions:  - Follow POC/Interventions  - See additional Care Plan goals for specific interventions  Outcome: Progressing  Goal: Mary Chowdhury for opioid side effects  - Notify MD/LIP if interventions unsuccessful or patient reports new pain  - Anticipate increased pain with activity and pre-medicate as appropriate  Outcome: Progressing     Problem: SAFETY ADULT - FALL  Goal: Free from fall injur

## 2021-06-03 NOTE — H&P
Pre-op Diagnosis: BPH with urinary obstruction  (primary encounter diagnosis)      The above referenced H&P was reviewed by Jenae Au MD on 6/3/2021, the patient was examined and no significant changes have occurred in the patient's condition since imaged b/l hydronephrosis. RBUS 4/18/21: markedly distended bladder, mod b/l hydro with 8 mm left renal stone  CT SP  4/19/21: markedly distended bladder, moderate to severe BPH with air noted in gallbladder.  He has a ~ 5 mm left renal calcification but t renal calcification but this may be vascular. No obstructing stones with hydroureter down to the bladder. AUA SS is 20/35 with 5/5 w, LOS; 4/5 f; 3/5 n; 2/5 I; 1/5 u. He is mostly unhappy with LUTS. Incontinence: occasional PVD  DRU deferred today. organism unspecified(455) 2014   • Sleep apnea Noticed by wife for a few years   • Type II or unspecified type diabetes mellitus without mention of complication, not stated as uncontrolled    • Unspecified essential hypertension    • Unspecified gastritis Medications (Active prior to today's visit):  No current outpatient medications on file.        Allergies:    Dander                      Comment:dog  Penicillins             DIARRHEA  Tetanus Toxoids               ROS:     A comprehensive 10 point revi surgar less than or equeal to 70mg/dl;  Standing  -     Cancel: Call anesthesia for any needed insulin orders; Standing  -     Cancel: glucose (DEX4) oral liquid 15 g  -     Cancel: Glucose-Vitamin C (DEX-4) chewable tab 4 tablet  -     Cancel: dextrose 50 insulin orders; Standing  -     levofloxacin in D5W (LEVAQUIN) IVPB premix 500 mg  -     Activity as tolerated Until Discontinued; Standing  -     Height and weight; Standing  -     Intake and Output; Standing  -     Notify physician; Standing  -     Deedee Araiza

## 2021-06-03 NOTE — ANESTHESIA POSTPROCEDURE EVALUATION
[address] [facility] Patient Status:  Outpatient in a Bed   Age/Gender 74year old male MRN [id number]   [facility] SURGERY Attending Micha Glynn MD   Hosp Day # 0 PCP Hailey Bey DO       Anesthesia Post-op Note    CYSTO

## 2021-06-03 NOTE — PLAN OF CARE
NURSING ADMISSION NOTE      Patient admitted via bed from PACU  Oriented to room. Safety precautions initiated. Bed in low position. Call light in reach. Pt is A/Ox4. VSS, afebrile.  Pt rates pain to low abdomen 1/10, declining need for pain med c

## 2021-06-03 NOTE — ANESTHESIA PREPROCEDURE EVALUATION
PRE-OP EVALUATION    Patient Name: Yamilet Pineda    Admit Diagnosis: BPH with urinary obstruction [N40.1, N13.8]    Pre-op Diagnosis: BPH with urinary obstruction [N40.1, N13.8]    CYSTOSCOPY TRANSURETHRAL RESECTION PROSTATE    Anesthesia Procedure: C  The cavity size was normal. Wall thickness was normal.   Systolic function was normal. The estimated ejection fraction was 65%. There   was no diagnostic evidence for regional wall motion abnormalities.  Doppler   parameters are consistent with abnormal le >4      (+) hypertension     (+) CAD                                Endo/Other      (+) diabetes  type 2, using insulin                         Pulmonary        (+) COPD and mild  COPD not requiring home oxygen.         (+) sleep apnea       Neuro/Psych guidelines. Post-procedure pain management plan discussed with surgeon and patient. Comment: GETA discussed in detail. Risk of head ache, sore throat, cough, dental trauma, PONV, bronchospasm, aspiration, heart attack, stroke, and death discussed.

## 2021-06-03 NOTE — OPERATIVE REPORT
Urology Operative Note    Attending Surgeon: Abiodun Giron    Patient Name: Alberto Benavides    Date of Surgery: 6/3/2021    Preoperative Diagnosis: BPH with severe LUTS and urinary retention    Postoperative Diagnosis: same    Procedure Performed: Cystos

## 2021-06-04 VITALS
SYSTOLIC BLOOD PRESSURE: 112 MMHG | RESPIRATION RATE: 16 BRPM | WEIGHT: 218.94 LBS | DIASTOLIC BLOOD PRESSURE: 59 MMHG | BODY MASS INDEX: 31.34 KG/M2 | TEMPERATURE: 98 F | OXYGEN SATURATION: 100 % | HEIGHT: 70 IN | HEART RATE: 59 BPM

## 2021-06-04 NOTE — PLAN OF CARE
NURSING DISCHARGE NOTE    A&Ox4. Discharge instructions given. All questions answered to pt satisfaction. IV removed and intact. Discharged Home via Wheelchair. Accompanied by Family member and Support staff  Belongings Taken by patient/family.

## 2021-06-04 NOTE — DISCHARGE SUMMARY
[facility]    Urology Discharge Summary    Cathay Brittle Patient Status:  Outpatient in a Bed    5/10/1955 MRN [id number]   [facility] 3NW-A Attending Josefina Llamas MD   Hosp Day # 0 PCP Shaista Frazier DO     Date of Admission:  Tab  Take 1 tablet (500 mg total) by mouth 2 (two) times daily for 4 days. Home Meds - Unchanged    Acetaminophen (ACETAMINOPHEN EXTRA STRENGTH) 500 MG Oral Cap  Take 1,000 mg by mouth one time.       Ezetimibe-Simvastatin 10-40 MG Oral Tab  Take 1 tab disregard previous qty on this. Pt uses up to 4x a day. Pt uses 360 a month~    !!  Insulin Pen Needle (BD PEN NEEDLE SHORT U/F) 31G X 8 MM Does not apply Misc  Use with Levemir at night and Humalog 3 times daily depending on diet plan    !! - Potential dup urine that should clear up within a few days. If you have a stent in place then you may see blood in the urine until the stent is eventually removed.      - Try to abstain from alcohol, coffee, tea, artificial sweeteners, and spicy food for the next 48 hour

## 2021-06-04 NOTE — PROGRESS NOTES
[facility]  Progress Note     [address] Rod[address]uez Patient Status:  Outpatient in a Bed    5/10/1955 MRN [id number]   [facility] 3NW-A Attending Osmany Cummings MD   Hosp Day # 0 PCP Yady Meraz DO     Chief Complaint: s/p turp    S: hours.    Inflammatory Markers  No results for input(s): CRP, DON, LDH, DDIMER in the last 168 hours. Imaging: Imaging data reviewed in Epic.     Medications:   • finasteride  5 mg Oral Daily   • Fluticasone Propionate  2 spray Each Nare Daily   • hydroc

## 2021-06-04 NOTE — PLAN OF CARE
Pt A&Ox4. RA. NSR. Denies any JENNY, CP or calf pain. Pt is due to void. Pt ambulating halls. Pt tolerating diet well, denies any N/V at this time. Up ad bandar. Safety precautions in place, call light in reach.    Problem: GASTROINTESTINAL - ADULT  Goal: [employer] INTERVENTIONS:  - Assess pt frequently for physical needs  - Identify cognitive and physical deficits and behaviors that affect risk of falls.   - [place] fall precautions as indicated by assessment.  - Educate pt/family on patient safety including physic

## 2021-06-04 NOTE — PROGRESS NOTES
CBI taken out 38ml out of the balloon prior to taking out filled the bladder with 200ml some small amount of bleeding pt tolerated cbi, urine was yellow clear no clots

## 2021-06-04 NOTE — PLAN OF CARE
Pt axox4 VSS, pt ambulating in champagne and resting in bed, pt CBI is running slow clear yellow urine draining.   Pt abdomen is soft non-tender rounded, hypoactive bowel sounds, cough non-productive, PIV infusing 100ml/hr NS, BS QID, 1800 carb controlled diet, pain with activity and pre-medicate as appropriate  Outcome: Progressing     Problem: SAFETY ADULT - FALL  Goal: Free from fall injury  Description: INTERVENTIONS:  - Assess pt frequently for physical needs  - Identify cognitive and physical deficits and b

## 2021-06-07 RX ORDER — OLMESARTAN MEDOXOMIL 40 MG/1
TABLET ORAL
Qty: 90 TABLET | Refills: 0 | Status: SHIPPED | OUTPATIENT
Start: 2021-06-07 | End: 2021-09-13

## 2021-06-07 RX ORDER — HYDROCHLOROTHIAZIDE 25 MG/1
TABLET ORAL
Qty: 90 TABLET | Refills: 0 | Status: SHIPPED | OUTPATIENT
Start: 2021-06-07 | End: 2021-09-13

## 2021-06-22 ENCOUNTER — TELEPHONE (OUTPATIENT)
Dept: SURGERY | Facility: CLINIC | Age: 66
End: 2021-06-22

## 2021-06-22 NOTE — PROGRESS NOTES
HPI:     Yamilet Pineda is a 74year old male with a PMH of obesity, HTN, HL, DM, GERD, COPD, asthma, CAD, hemorrhoids    Following for:  1.  Severe BPH/LUTS with urinary retention causing b/l hydronephrosis incidentally noted on imaging  - conteh lisa proscar. He does not feel full. PVR was > 2000 mL last visit. AUA SS is 20/35 with 5/5 w, LOS; 4/5 f; 3/5 n; 2/5 I; 1/5 u. He is mostly unhappy with LUTS. Incontinence: occasional PVD  DRU deferred today.     UTI history: once ~ 1 y ago  Gross hematuria years ago    Turp scheduled for Merlyn 3   • Esophageal reflux    • Flatulence/gas pain/belching    • Frequent urination    • Hemorrhoids 2.5 years ago    Dr Sepideh Valiente rubber banded 1.5 years ago   • Hiatal hernia    • High blood pressure    • High cholesterol Years: 30.00        Pack years: 30        Types: Cigarettes      Smokeless tobacco: Never Used      Tobacco comment: 1.0 pack weekends, only 4 cigs QD week days    Vaping Use      Vaping Use: Never used    Alcohol use: Not Currently      Alcohol/week: 0.0 Other route 4 (four) times daily with meals and nightly. 400 each 3   • Tiotropium Bromide Monohydrate (SPIRIVA HANDIHALER) 18 MCG Inhalation Cap Inhale 1 capsule (18 mcg total) into the lungs daily.  90 capsule 1   • Insulin Pen Needle (BD PEN NEEDLE SHORT next week with PVR and symptom check or sooner if issues arise.  - Due to urinary retention patient is to cath up to 2 times daily for an indefinite period of time.  Also, patient needs a coude catheter due to the inability to pass a straight catheter will

## 2021-06-22 NOTE — TELEPHONE ENCOUNTER
I called the pt and asked if he can come in at 11:15 instead of 11:30 on 6/29. Pt confirmed that the updated time will work for him.

## 2021-06-29 ENCOUNTER — OFFICE VISIT (OUTPATIENT)
Dept: SURGERY | Facility: CLINIC | Age: 66
End: 2021-06-29
Payer: MEDICARE

## 2021-06-29 VITALS — SYSTOLIC BLOOD PRESSURE: 113 MMHG | TEMPERATURE: 97 F | HEART RATE: 97 BPM | DIASTOLIC BLOOD PRESSURE: 75 MMHG

## 2021-06-29 DIAGNOSIS — R97.20 ELEVATED PSA: ICD-10-CM

## 2021-06-29 DIAGNOSIS — R31.21 ASYMPTOMATIC MICROSCOPIC HEMATURIA: ICD-10-CM

## 2021-06-29 DIAGNOSIS — N13.8 BPH WITH URINARY OBSTRUCTION: Primary | ICD-10-CM

## 2021-06-29 DIAGNOSIS — N40.1 BPH WITH URINARY OBSTRUCTION: Primary | ICD-10-CM

## 2021-06-29 DIAGNOSIS — N13.30 BILATERAL HYDRONEPHROSIS: ICD-10-CM

## 2021-06-29 DIAGNOSIS — R33.9 URINARY RETENTION: ICD-10-CM

## 2021-06-29 PROCEDURE — 99024 POSTOP FOLLOW-UP VISIT: CPT | Performed by: UROLOGY

## 2021-06-29 PROCEDURE — 1111F DSCHRG MED/CURRENT MED MERGE: CPT | Performed by: UROLOGY

## 2021-06-29 PROCEDURE — 81003 URINALYSIS AUTO W/O SCOPE: CPT | Performed by: UROLOGY

## 2021-06-29 PROCEDURE — 51798 US URINE CAPACITY MEASURE: CPT | Performed by: UROLOGY

## 2021-06-29 NOTE — PATIENT INSTRUCTIONS
1. Please catheterize twice daily for the next few weeks. You should void prior to catheterizing. Please record values every once in a while and bring these values to your next appointment.   2. Drink minimum of 40-60 ounces water per day to keep urine valentine

## 2021-06-30 ENCOUNTER — TELEPHONE (OUTPATIENT)
Dept: SURGERY | Facility: CLINIC | Age: 66
End: 2021-06-30

## 2021-06-30 ENCOUNTER — MED REC SCAN ONLY (OUTPATIENT)
Dept: FAMILY MEDICINE CLINIC | Facility: CLINIC | Age: 66
End: 2021-06-30

## 2021-06-30 NOTE — TELEPHONE ENCOUNTER
Pt called stating pt had an appointment yesterday 6-29-21. Discussed coming back on 7-9-21. [facility]. Appointment canceled. Pt requesting a later appointment.   Please call pt

## 2021-06-30 NOTE — TELEPHONE ENCOUNTER
This RN called patient in response to his call:     \"Pt called stating pt had an appointment yesterday 6-29-21. Discussed coming back on 7-9-21. Pt viewed appointments on [facility] and pt is scheduled 7-2-21. Appointment canceled.   Pt requesting a later

## 2021-06-30 NOTE — PROGRESS NOTES
HPI:     Mindy Langston is a 74year old male with a PMH of obesity, HTN, HL, DM, GERD, COPD, asthma, CAD, hemorrhoids    Following for:  1.  Hypotonic neurogenic bladder with urinary retention requiring CIC  - incidentally found to have urinary rete BID CIC and tolerating CIC without issues. Will have him continue this regimen and record values for the next month.  Discussed that hopefully bladder function will continue to improve with time and with continuing CIC but also discussed he may have permane 10/8/20  - 3.14 7/18/19  - 4.22 4/19/19  - 2.54 11/9/17  - 2.27 11/29/12    CMP 3/19/21 with normal Cr. CT chest 4/13/21: partially imaged b/l hydronephrosis.   RBUS 4/18/21: markedly distended bladder, mod b/l hydro with 8 mm left renal stone  CT SP  4/ unspecified(605) 2014   • Sleep apnea Noticed by wife for a few years   • Type II or unspecified type diabetes mellitus without mention of complication, not stated as uncontrolled    • Unspecified essential hypertension    • Unspecified gastritis and gastr (Active prior to today's visit):  Current Outpatient Medications   Medication Sig Dispense Refill   • Ciprofloxacin HCl 500 MG Oral Tab Take 1 tablet (500 mg total) by mouth 2 (two) times daily for 10 days.  20 tablet 0   • HYDROCHLOROTHIAZIDE 25 MG Oral Ta previous qty on this. Pt uses up to 4x a day.  Pt uses 360 a month~ 400 each 1   • Insulin Pen Needle (BD PEN NEEDLE SHORT U/F) 31G X 8 MM Does not apply Misc Use with Levemir at night and Humalog 3 times daily depending on diet plan 360 each 1   • Niacin C daily for an indefinite period of time. Also, patient needs a coude catheter due to the inability to pass a straight catheter because of urethral anatomy. Thanks again for this consult.     Dayana Zhang MD, [address]  Urologist  [address]  Off

## 2021-07-02 ENCOUNTER — LAB ENCOUNTER (OUTPATIENT)
Dept: LAB | Age: 66
End: 2021-07-02
Attending: FAMILY MEDICINE
Payer: MEDICARE

## 2021-07-02 DIAGNOSIS — E11.40 TYPE 2 DIABETES MELLITUS WITH DIABETIC NEUROPATHY, WITH LONG-TERM CURRENT USE OF INSULIN (HCC): ICD-10-CM

## 2021-07-02 DIAGNOSIS — E11.69 DIABETES MELLITUS TYPE 2 IN OBESE (HCC): ICD-10-CM

## 2021-07-02 DIAGNOSIS — E66.9 DIABETES MELLITUS TYPE 2 IN OBESE (HCC): ICD-10-CM

## 2021-07-02 DIAGNOSIS — Z79.4 TYPE 2 DIABETES MELLITUS WITH DIABETIC NEUROPATHY, WITH LONG-TERM CURRENT USE OF INSULIN (HCC): ICD-10-CM

## 2021-07-02 DIAGNOSIS — E78.5 DYSLIPIDEMIA: ICD-10-CM

## 2021-07-02 DIAGNOSIS — Z13.0 SCREENING FOR DEFICIENCY ANEMIA: ICD-10-CM

## 2021-07-02 DIAGNOSIS — E55.9 VITAMIN D DEFICIENCY: ICD-10-CM

## 2021-07-02 LAB
ALBUMIN SERPL-MCNC: 3.9 G/DL (ref 3.4–5)
ALBUMIN/GLOB SERPL: 1.1 {RATIO} (ref 1–2)
ALP LIVER SERPL-CCNC: 88 U/L
ALT SERPL-CCNC: 22 U/L
ANION GAP SERPL CALC-SCNC: 8 MMOL/L (ref 0–18)
AST SERPL-CCNC: 12 U/L (ref 15–37)
BASOPHILS # BLD AUTO: 0.05 X10(3) UL (ref 0–0.2)
BASOPHILS NFR BLD AUTO: 0.5 %
BILIRUB SERPL-MCNC: 0.4 MG/DL (ref 0.1–2)
BUN BLD-MCNC: 40 MG/DL (ref 7–18)
BUN/CREAT SERPL: 32.5 (ref 10–20)
CALCIUM BLD-MCNC: 9.3 MG/DL (ref 8.5–10.1)
CHLORIDE SERPL-SCNC: 106 MMOL/L (ref 98–112)
CHOLEST SMN-MCNC: 145 MG/DL (ref ?–200)
CO2 SERPL-SCNC: 21 MMOL/L (ref 21–32)
CREAT BLD-MCNC: 1.23 MG/DL
DEPRECATED RDW RBC AUTO: 42.9 FL (ref 35.1–46.3)
EOSINOPHIL # BLD AUTO: 0.16 X10(3) UL (ref 0–0.7)
EOSINOPHIL NFR BLD AUTO: 1.5 %
ERYTHROCYTE [DISTWIDTH] IN BLOOD BY AUTOMATED COUNT: 13.7 % (ref 11–15)
EST. AVERAGE GLUCOSE BLD GHB EST-MCNC: 131 MG/DL (ref 68–126)
GLOBULIN PLAS-MCNC: 3.6 G/DL (ref 2.8–4.4)
GLUCOSE BLD-MCNC: 180 MG/DL (ref 70–99)
HBA1C MFR BLD HPLC: 6.2 % (ref ?–5.7)
HCT VFR BLD AUTO: 38.3 %
HDLC SERPL-MCNC: 53 MG/DL (ref 40–59)
HGB BLD-MCNC: 12.6 G/DL
IMM GRANULOCYTES # BLD AUTO: 0.06 X10(3) UL (ref 0–1)
IMM GRANULOCYTES NFR BLD: 0.6 %
LDLC SERPL CALC-MCNC: 72 MG/DL (ref ?–100)
LYMPHOCYTES # BLD AUTO: 2.14 X10(3) UL (ref 1–4)
LYMPHOCYTES NFR BLD AUTO: 20.4 %
M PROTEIN MFR SERPL ELPH: 7.5 G/DL (ref 6.4–8.2)
MCH RBC QN AUTO: 28.1 PG (ref 26–34)
MCHC RBC AUTO-ENTMCNC: 32.9 G/DL (ref 31–37)
MCV RBC AUTO: 85.5 FL
MONOCYTES # BLD AUTO: 0.56 X10(3) UL (ref 0.1–1)
MONOCYTES NFR BLD AUTO: 5.3 %
NEUTROPHILS # BLD AUTO: 7.51 X10 (3) UL (ref 1.5–7.7)
NEUTROPHILS # BLD AUTO: 7.51 X10(3) UL (ref 1.5–7.7)
NEUTROPHILS NFR BLD AUTO: 71.7 %
NONHDLC SERPL-MCNC: 92 MG/DL (ref ?–130)
OSMOLALITY SERPL CALC.SUM OF ELEC: 294 MOSM/KG (ref 275–295)
PATIENT FASTING Y/N/NP: YES
PATIENT FASTING Y/N/NP: YES
PLATELET # BLD AUTO: 189 10(3)UL (ref 150–450)
POTASSIUM SERPL-SCNC: 4.7 MMOL/L (ref 3.5–5.1)
RBC # BLD AUTO: 4.48 X10(6)UL
SODIUM SERPL-SCNC: 135 MMOL/L (ref 136–145)
TRIGL SERPL-MCNC: 114 MG/DL (ref 30–149)
TSI SER-ACNC: 1.06 MIU/ML (ref 0.36–3.74)
VIT D+METAB SERPL-MCNC: 44 NG/ML (ref 30–100)
VLDLC SERPL CALC-MCNC: 17 MG/DL (ref 0–30)
WBC # BLD AUTO: 10.5 X10(3) UL (ref 4–11)

## 2021-07-02 PROCEDURE — 80053 COMPREHEN METABOLIC PANEL: CPT

## 2021-07-02 PROCEDURE — 85025 COMPLETE CBC W/AUTO DIFF WBC: CPT

## 2021-07-02 PROCEDURE — 80061 LIPID PANEL: CPT

## 2021-07-02 PROCEDURE — 84443 ASSAY THYROID STIM HORMONE: CPT

## 2021-07-02 PROCEDURE — 36415 COLL VENOUS BLD VENIPUNCTURE: CPT

## 2021-07-02 PROCEDURE — 82306 VITAMIN D 25 HYDROXY: CPT

## 2021-07-02 PROCEDURE — 83036 HEMOGLOBIN GLYCOSYLATED A1C: CPT

## 2021-07-08 ENCOUNTER — OFFICE VISIT (OUTPATIENT)
Dept: FAMILY MEDICINE CLINIC | Facility: CLINIC | Age: 66
End: 2021-07-08
Payer: MEDICARE

## 2021-07-08 VITALS
HEART RATE: 84 BPM | WEIGHT: 227 LBS | SYSTOLIC BLOOD PRESSURE: 130 MMHG | HEIGHT: 70 IN | BODY MASS INDEX: 32.5 KG/M2 | DIASTOLIC BLOOD PRESSURE: 70 MMHG | RESPIRATION RATE: 18 BRPM

## 2021-07-08 DIAGNOSIS — Z87.39 HISTORY OF GOUT: ICD-10-CM

## 2021-07-08 DIAGNOSIS — E66.9 OBESITY (BMI 30-39.9): ICD-10-CM

## 2021-07-08 DIAGNOSIS — K80.20 GALLSTONES: ICD-10-CM

## 2021-07-08 DIAGNOSIS — K82.3 CHOLECYSTODUODENAL FISTULA: ICD-10-CM

## 2021-07-08 DIAGNOSIS — Z87.19 HISTORY OF HEMORRHOIDS: ICD-10-CM

## 2021-07-08 DIAGNOSIS — I35.9 AORTIC VALVE CALCIFICATION: ICD-10-CM

## 2021-07-08 DIAGNOSIS — D17.22 BENIGN LIPOMATOUS NEOPLASM OF SKIN AND SUBCUTANEOUS TISSUE OF LEFT ARM: ICD-10-CM

## 2021-07-08 DIAGNOSIS — Z00.00 ENCOUNTER FOR ANNUAL HEALTH EXAMINATION: ICD-10-CM

## 2021-07-08 DIAGNOSIS — K22.719 BARRETT'S ESOPHAGUS WITH DYSPLASIA: ICD-10-CM

## 2021-07-08 DIAGNOSIS — K29.70 GASTRITIS AND GASTRODUODENITIS: ICD-10-CM

## 2021-07-08 DIAGNOSIS — I35.0 MILD AORTIC STENOSIS: ICD-10-CM

## 2021-07-08 DIAGNOSIS — Z71.85 VACCINE COUNSELING: ICD-10-CM

## 2021-07-08 DIAGNOSIS — E66.9 DIABETES MELLITUS TYPE 2 IN OBESE (HCC): ICD-10-CM

## 2021-07-08 DIAGNOSIS — G47.10 HYPERSOMNOLENCE: ICD-10-CM

## 2021-07-08 DIAGNOSIS — K44.9 DIAPHRAGMATIC HERNIA WITHOUT OBSTRUCTION AND WITHOUT GANGRENE: ICD-10-CM

## 2021-07-08 DIAGNOSIS — Z90.79 S/P TURP: ICD-10-CM

## 2021-07-08 DIAGNOSIS — Z79.899 MEDICATION MANAGEMENT: ICD-10-CM

## 2021-07-08 DIAGNOSIS — E11.69 DIABETES MELLITUS TYPE 2 IN OBESE (HCC): ICD-10-CM

## 2021-07-08 DIAGNOSIS — Z79.4 TYPE 2 DIABETES MELLITUS WITH DIABETIC NEUROPATHY, WITH LONG-TERM CURRENT USE OF INSULIN (HCC): Primary | ICD-10-CM

## 2021-07-08 DIAGNOSIS — Z13.31 DEPRESSION SCREENING: ICD-10-CM

## 2021-07-08 DIAGNOSIS — I10 ESSENTIAL HYPERTENSION: ICD-10-CM

## 2021-07-08 DIAGNOSIS — Z87.891 PERSONAL HISTORY OF NICOTINE DEPENDENCE: ICD-10-CM

## 2021-07-08 DIAGNOSIS — K29.90 GASTRITIS AND GASTRODUODENITIS: ICD-10-CM

## 2021-07-08 DIAGNOSIS — K57.30 DIVERTICULOSIS OF COLON: ICD-10-CM

## 2021-07-08 DIAGNOSIS — E55.9 VITAMIN D DEFICIENCY: ICD-10-CM

## 2021-07-08 DIAGNOSIS — E11.40 TYPE 2 DIABETES MELLITUS WITH DIABETIC NEUROPATHY, WITH LONG-TERM CURRENT USE OF INSULIN (HCC): Primary | ICD-10-CM

## 2021-07-08 DIAGNOSIS — I25.10 CORONARY ARTERY DISEASE INVOLVING NATIVE CORONARY ARTERY OF NATIVE HEART WITHOUT ANGINA PECTORIS: ICD-10-CM

## 2021-07-08 DIAGNOSIS — E78.5 DYSLIPIDEMIA: ICD-10-CM

## 2021-07-08 DIAGNOSIS — Z80.0 FAMILY HISTORY OF MALIGNANT NEOPLASM OF GASTROINTESTINAL TRACT: ICD-10-CM

## 2021-07-08 DIAGNOSIS — R06.81 APNEA: ICD-10-CM

## 2021-07-08 DIAGNOSIS — R06.83 SNORING: ICD-10-CM

## 2021-07-08 DIAGNOSIS — F17.200 SMOKING: ICD-10-CM

## 2021-07-08 DIAGNOSIS — N40.1 BENIGN PROSTATIC HYPERPLASIA WITH LOWER URINARY TRACT SYMPTOMS, SYMPTOM DETAILS UNSPECIFIED: ICD-10-CM

## 2021-07-08 DIAGNOSIS — I05.9 MITRAL VALVE ANNULAR CALCIFICATION: ICD-10-CM

## 2021-07-08 DIAGNOSIS — J44.9 CHRONIC OBSTRUCTIVE PULMONARY DISEASE, UNSPECIFIED COPD TYPE (HCC): ICD-10-CM

## 2021-07-08 PROCEDURE — 99215 OFFICE O/P EST HI 40 MIN: CPT | Performed by: FAMILY MEDICINE

## 2021-07-08 NOTE — PATIENT INSTRUCTIONS
[address] [facility] SCREENING SCHEDULE   Tests on this list are recommended by your physician but may not be covered, or covered at this frequency, by your insurer. Please check with your insurance carrier before scheduling to verify coverage.    Roman Ludwig Pneumococcal Each vaccine ([id number] & [id number]) covered once after 65 Prevnar 13: 05/23/2018    [id number]: 07/23/2019     No recommendations at this time    Hepatitis B One screening covered for patients with certain risk factors   08/12/2015  No rec website. http://www. [place]ph.state. [place]/public/books/advin.htm  A link to the [employer]. This site has a lot of good information including definitions of the different types of Advance Directives.  It also has the [facility] forms available

## 2021-07-08 NOTE — PROGRESS NOTES
HPI:   Naun Tse is a 74year old male who presents for recheck of his diabetes, hypertension and dyslipidemia.  Patient has  been checking his FBS's at home regularly -- 130's  Last eye exam -- August 2021  Patient presents for recheck of his hype 98 - 112 mmol/L    CO2 21.0 21.0 - 32.0 mmol/L    Anion Gap 8 0 - 18 mmol/L    BUN 40 (H) 7 - 18 mg/dL    Creatinine 1.23 0.70 - 1.30 mg/dL    BUN/CREA Ratio 32.5 (H) 10.0 - 20.0    Calcium, Total 9.3 8.5 - 10.1 mg/dL    Calculated Osmolality 294 275 - 295 total) by mouth 2 (two) times daily as needed for constipation. 30 capsule 0   • Fluticasone Propionate 50 MCG/ACT Nasal Suspension 2 sprays by Each Nare route daily as needed for Rhinitis.      • NOVOLOG FLEXPEN 100 UNIT/ML Subcutaneous Solution Pen-inject heavy for week after catheter inserted   • Chronic cough    • COPD (chronic obstructive pulmonary disease) ([facility] [address]    • Diabetes mellitus ([facility] [address] 15-20 years ago   • Disorder of prostate Enlarged for 15 to 20 years ago    Turp scheduled for Merlyn 3   • Esophage drinks      Comment: social/ 0 in last 4 mon    Drug use: No    Exercise: around the house.   Diet: watches fats closely, watches sugar closely and watches calories closely     REVIEW OF SYSTEMS:   GENERAL HEALTH: feels well otherwise  SKIN: denies any unus and self cathing and residual improving -- best yesterday 200 mL. Seeing Dr. Du Room. Smoking -- still smoking. Stable on Spiriva for COPD. Advised strongly to quit smoking. He will think about it.   Vitamin d def -- stable  Aortic stenosis -- stable  Hx screening  S/p turp    Orders Placed This Encounter      Comp Metabolic Panel (14)      Lipid Panel      Hemoglobin A1C      Meds & Refills for this Visit:  Requested Prescriptions      No prescriptions requested or ordered in this encounter       Imaging

## 2021-07-09 ENCOUNTER — OFFICE VISIT (OUTPATIENT)
Dept: SURGERY | Facility: CLINIC | Age: 66
End: 2021-07-09
Payer: MEDICARE

## 2021-07-09 DIAGNOSIS — N40.1 BPH WITH URINARY OBSTRUCTION: Primary | ICD-10-CM

## 2021-07-09 DIAGNOSIS — N13.30 BILATERAL HYDRONEPHROSIS: ICD-10-CM

## 2021-07-09 DIAGNOSIS — N31.9 HYPOTONIC NEUROGENIC BLADDER: ICD-10-CM

## 2021-07-09 DIAGNOSIS — N13.8 BPH WITH URINARY OBSTRUCTION: Primary | ICD-10-CM

## 2021-07-09 DIAGNOSIS — R33.9 URINARY RETENTION: ICD-10-CM

## 2021-07-09 DIAGNOSIS — R82.90 CLOUDY URINE: ICD-10-CM

## 2021-07-09 LAB
BILIRUBIN: NEGATIVE
GLUCOSE (URINE DIPSTICK): NEGATIVE MG/DL
KETONES (URINE DIPSTICK): NEGATIVE MG/DL
MULTISTIX LOT#: ABNORMAL NUMERIC
NITRITE, URINE: NEGATIVE
PH, URINE: 6 (ref 4.5–8)
PROTEIN (URINE DIPSTICK): 30 MG/DL
SPECIFIC GRAVITY: 1.02 (ref 1–1.03)
URINE-COLOR: YELLOW
UROBILINOGEN,SEMI-QN: 0.2 MG/DL (ref 0–1.9)

## 2021-07-09 PROCEDURE — 99024 POSTOP FOLLOW-UP VISIT: CPT | Performed by: UROLOGY

## 2021-07-09 PROCEDURE — 81003 URINALYSIS AUTO W/O SCOPE: CPT | Performed by: UROLOGY

## 2021-07-09 PROCEDURE — 51798 US URINE CAPACITY MEASURE: CPT | Performed by: UROLOGY

## 2021-07-09 RX ORDER — CIPROFLOXACIN 500 MG/1
500 TABLET, FILM COATED ORAL 2 TIMES DAILY
Qty: 20 TABLET | Refills: 0 | Status: SHIPPED | OUTPATIENT
Start: 2021-07-09 | End: 2021-07-19

## 2021-07-09 NOTE — PATIENT INSTRUCTIONS
1. Drink enough water to keep urine clear to pale yellow for UTI prevention. 2. Continue twice daily CIC for now. Record voided and post-void values every couple days and bring this to your next appointment. 3. Continue flomax and proscar  4.  Call back i

## 2021-07-12 ENCOUNTER — TELEPHONE (OUTPATIENT)
Dept: SURGERY | Facility: CLINIC | Age: 66
End: 2021-07-12

## 2021-07-12 ENCOUNTER — PATIENT MESSAGE (OUTPATIENT)
Dept: SURGERY | Facility: CLINIC | Age: 66
End: 2021-07-12

## 2021-07-12 NOTE — TELEPHONE ENCOUNTER
RN called patient in response to his call:     \"Per patient he was scheduled for a 4 week follow up around 8/4 and is currently scheduled first available morning appointment 8/30. Per pt wants to make sure it is ok to wait another 3 weeks.  Please advise\"

## 2021-07-12 NOTE — TELEPHONE ENCOUNTER
Per patient he was scheduled for a 4 week follow up around 8/4 and is currently scheduled first available morning appointment 8/30. Per pt wants to make sure it is ok to wait another 3 weeks.  Please advise

## 2021-07-13 NOTE — TELEPHONE ENCOUNTER
From: Jermaine Mendoza  To: Jocy Jasmine MD  Sent: 7/12/2021 4:21 PM CDT  Subject: Prescription Question    I saw that the urine culture results taken on 7/9/2021 was posted on My Chart. I assume that the results shown confirm that I have an UTI.      Is

## 2021-07-30 ENCOUNTER — HOSPITAL ENCOUNTER (OUTPATIENT)
Dept: GENERAL RADIOLOGY | Age: 66
Discharge: HOME | End: 2021-07-30
Attending: COLON & RECTAL SURGERY
Payer: MEDICARE

## 2021-07-30 DIAGNOSIS — L98.8 FISTULA: ICD-10-CM

## 2021-07-30 PROCEDURE — 74240 X-RAY XM UPR GI TRC 1CNTRST: CPT | Performed by: COLON & RECTAL SURGERY

## 2021-07-30 NOTE — PROGRESS NOTES
HPI:     Naun Tse is a 74year old male with a PMH of obesity, HTN, HL, DM, GERD, COPD, asthma, CAD, hemorrhoids    Following for:  1.  Hypotonic neurogenic bladder with urinary retention requiring CIC  - incidentally found to have urinary rete discussed that he appears to have hypotonic neurogenic bladder from longstanding urinary retention. Currently emptying well now with low values on once daily CIC. CT chest 4/13/21: partially imaged b/l hydronephrosis.   RBUS 4/18/21: markedly distended b teaching but he prefers conteh replacement. 18F conteh inserted with > 1 L out, clear kimber.     HISTORY:  Past Medical History:   Diagnosis Date   • Elizondo's esophagus    • Blood in urine April 2021    Enlarged badder, heavy for week after catheter inserted (Other[other]) Father    • Stroke Father    • Other (Amputation[other]) Brother         leg below knee   • Diabetes Brother    • Cancer Sister         colon   • Diabetes Brother         Type II   • Other (Diabetic retinopathy[other]) Brother    • Cancer Si tablet (1,000 mg total) by mouth 2 (two) times daily with meals.  180 tablet 1   • LEVEMIR FLEXTOUCH 100 UNIT/ML Subcutaneous Solution Pen-injector Take 20 units Sub-Q in AM and 25 units at night 15 pen 3   • Glucose Blood (ACCU-CHEK ACTIVE) In Vitro Strip ABDOMEN+PELVIS KIDNEYSTONE 2D RNDR(NO IV,NO ORAL)(CPT=74176);  Future    Asymptomatic microscopic hematuria    BPH with obstruction/lower urinary tract symptoms    Elevated PSA  -     PSA, DIAGNOSTIC; Future      - Repeat CT SP to ensure hydronephrosis is i

## 2021-08-03 RX ORDER — OMEGA-3-ACID ETHYL ESTERS 1 G/1
CAPSULE, LIQUID FILLED ORAL
Qty: 360 CAPSULE | Refills: 1 | Status: SHIPPED | OUTPATIENT
Start: 2021-08-03 | End: 2022-03-28

## 2021-08-03 RX ORDER — EZETIMIBE AND SIMVASTATIN 10; 40 MG/1; MG/1
1 TABLET ORAL EVERY OTHER DAY
Qty: 45 TABLET | Refills: 1 | Status: SHIPPED | OUTPATIENT
Start: 2021-08-03 | End: 2022-03-28

## 2021-08-03 NOTE — PROGRESS NOTES
Future Appointments  8/23/2021  4:45 PM    Karina Edwards MD          [facility] General

## 2021-08-05 RX ORDER — EZETIMIBE AND SIMVASTATIN 10; 40 MG/1; MG/1
1 TABLET ORAL EVERY OTHER DAY
Qty: 45 TABLET | Refills: 1 | OUTPATIENT
Start: 2021-08-05

## 2021-08-11 ENCOUNTER — OFFICE VISIT (OUTPATIENT)
Dept: SURGERY | Facility: CLINIC | Age: 66
End: 2021-08-11
Payer: MEDICARE

## 2021-08-11 DIAGNOSIS — N40.1 BPH WITH URINARY OBSTRUCTION: Primary | ICD-10-CM

## 2021-08-11 DIAGNOSIS — R97.20 ELEVATED PSA: ICD-10-CM

## 2021-08-11 DIAGNOSIS — R31.21 ASYMPTOMATIC MICROSCOPIC HEMATURIA: ICD-10-CM

## 2021-08-11 DIAGNOSIS — N40.1 BPH WITH OBSTRUCTION/LOWER URINARY TRACT SYMPTOMS: ICD-10-CM

## 2021-08-11 DIAGNOSIS — N13.30 BILATERAL HYDRONEPHROSIS: ICD-10-CM

## 2021-08-11 DIAGNOSIS — R33.9 URINARY RETENTION: ICD-10-CM

## 2021-08-11 DIAGNOSIS — N13.8 BPH WITH OBSTRUCTION/LOWER URINARY TRACT SYMPTOMS: ICD-10-CM

## 2021-08-11 DIAGNOSIS — N13.8 BPH WITH URINARY OBSTRUCTION: Primary | ICD-10-CM

## 2021-08-11 LAB
BILIRUBIN: NEGATIVE
GLUCOSE (URINE DIPSTICK): NEGATIVE MG/DL
KETONES (URINE DIPSTICK): NEGATIVE MG/DL
MULTISTIX LOT#: ABNORMAL NUMERIC
NITRITE, URINE: NEGATIVE
PH, URINE: 6 (ref 4.5–8)
PROTEIN (URINE DIPSTICK): NEGATIVE MG/DL
SPECIFIC GRAVITY: 1.01 (ref 1–1.03)
URINE-COLOR: YELLOW
UROBILINOGEN,SEMI-QN: 0.2 MG/DL (ref 0–1.9)

## 2021-08-11 PROCEDURE — 99024 POSTOP FOLLOW-UP VISIT: CPT | Performed by: UROLOGY

## 2021-08-11 PROCEDURE — 81003 URINALYSIS AUTO W/O SCOPE: CPT | Performed by: UROLOGY

## 2021-08-11 NOTE — PATIENT INSTRUCTIONS
1. Increase water intake to 40-60 ounces (2 liters) per day or enough to keep urine clear to pale yellow. 2. Continue tamsulosin and finasteride  3. Catheterize every other day.  If values are consistently < 200 mL for at least 1-2 weeks can go to 1-2 time

## 2021-08-23 ENCOUNTER — OFFICE VISIT (OUTPATIENT)
Dept: SURGERY | Facility: CLINIC | Age: 66
End: 2021-08-23
Payer: MEDICARE

## 2021-08-23 VITALS
TEMPERATURE: 97 F | HEART RATE: 84 BPM | SYSTOLIC BLOOD PRESSURE: 147 MMHG | WEIGHT: 231 LBS | HEIGHT: 70 IN | BODY MASS INDEX: 33.07 KG/M2 | DIASTOLIC BLOOD PRESSURE: 83 MMHG

## 2021-08-23 DIAGNOSIS — R93.2 ABNORMAL FINDINGS ON DIAGNOSTIC IMAGING OF GALLBLADDER: ICD-10-CM

## 2021-08-23 DIAGNOSIS — I25.10 CORONARY ARTERY DISEASE INVOLVING NATIVE CORONARY ARTERY OF NATIVE HEART WITHOUT ANGINA PECTORIS: ICD-10-CM

## 2021-08-23 DIAGNOSIS — Z79.4 TYPE 2 DIABETES MELLITUS WITH DIABETIC NEUROPATHY, WITH LONG-TERM CURRENT USE OF INSULIN (HCC): ICD-10-CM

## 2021-08-23 DIAGNOSIS — K82.3 CHOLECYSTODUODENAL FISTULA: Primary | ICD-10-CM

## 2021-08-23 DIAGNOSIS — E11.40 TYPE 2 DIABETES MELLITUS WITH DIABETIC NEUROPATHY, WITH LONG-TERM CURRENT USE OF INSULIN (HCC): ICD-10-CM

## 2021-08-23 PROCEDURE — 99213 OFFICE O/P EST LOW 20 MIN: CPT | Performed by: COLON & RECTAL SURGERY

## 2021-08-23 NOTE — PROGRESS NOTES
Follow Up Visit Note       Active Problems      1. Cholecystoduodenal fistula    2. Abnormal findings on diagnostic imaging of gallbladder    3. Type 2 diabetes mellitus with diabetic neuropathy, with long-term current use of insulin ([address]    4.  Coronary a all appropriate changes in documentation as necessary  I attest to the accuracy of this note as detailed above    Johnnie Cleary MD FACS FASCRS    My total time spent with this patient on today's visit was 20 minutes.   This includes time in preparation, at 10:16 AM       Finalized by (CST): Mike David MD on 7/30/2021 at 10:22 AM            Allergies  Van Child is allergic to dander, penicillins, and tetanus toxoids.     Past Medical / Surgical / Social / Family History    The past medical and past surgica Location:  ENDOSCOPY       The family history and social history have been reviewed by me today.     Family History   Problem Relation Age of Onset   • Other (CHF[other]) Mother    • Diabetes Mother         Type II   • Diabetes Father         Type II   • Disp: , Rfl:   •  docusate sodium 100 MG Oral Cap, Take 1 capsule (100 mg total) by mouth 2 (two) times daily as needed for constipation. , Disp: 30 capsule, Rfl: 0  •  Fluticasone Propionate 50 MCG/ACT Nasal Suspension, 2 sprays by Each Nare route daily as been reviewed by me during today. Review of Systems   Constitutional: Negative for chills, diaphoresis, fatigue, fever and unexpected weight change. HENT: Negative for hearing loss, nosebleeds, sore throat and trouble swallowing.     Respiratory: Negativ pectoris    Plan   The patient presents today for continued care and evaluation regarding a possible cholecystoduodenal fistula. The patient states that since his last visit he remains completely asymptomatic. He states he has never had any symptoms. this time. He may call or return anytime with the above listed symptoms. No orders of the defined types were placed in this encounter. Imaging & Referrals   None    Follow Up  Return if symptoms worsen or fail to improve.     Gordon Sandhu MD

## 2021-08-23 NOTE — PATIENT INSTRUCTIONS
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 The patient states that since his last visit he remains completely asymptomatic. He states he has never had any symptoms.   He would never have call or return anytime with the above listed symptoms.

## 2021-08-25 DIAGNOSIS — N40.1 BPH WITH OBSTRUCTION/LOWER URINARY TRACT SYMPTOMS: ICD-10-CM

## 2021-08-25 DIAGNOSIS — N13.8 BPH WITH OBSTRUCTION/LOWER URINARY TRACT SYMPTOMS: ICD-10-CM

## 2021-08-26 ENCOUNTER — MED REC SCAN ONLY (OUTPATIENT)
Dept: FAMILY MEDICINE CLINIC | Facility: CLINIC | Age: 66
End: 2021-08-26

## 2021-08-27 RX ORDER — TAMSULOSIN HYDROCHLORIDE 0.4 MG/1
CAPSULE ORAL
Qty: 90 CAPSULE | Refills: 3 | Status: SHIPPED | OUTPATIENT
Start: 2021-08-27

## 2021-08-27 NOTE — TELEPHONE ENCOUNTER
Benign Prostatic Hypertrophy Medications      Protocol Criteria:  • Appointment scheduled in the past 12 months or in the next 2 months  • If patients is between the ages of 48 and 79 then PSA done within the last 2 years and is either  o Less than or [facility]

## 2021-09-01 DIAGNOSIS — Z79.4 TYPE 2 DIABETES MELLITUS WITH DIABETIC NEUROPATHY, WITH LONG-TERM CURRENT USE OF INSULIN (HCC): ICD-10-CM

## 2021-09-01 DIAGNOSIS — E11.40 TYPE 2 DIABETES MELLITUS WITH DIABETIC NEUROPATHY, WITH LONG-TERM CURRENT USE OF INSULIN (HCC): ICD-10-CM

## 2021-09-01 RX ORDER — INSULIN DETEMIR 100 [IU]/ML
INJECTION, SOLUTION SUBCUTANEOUS
Qty: 15 ML | Refills: 1 | Status: SHIPPED | OUTPATIENT
Start: 2021-09-01 | End: 2021-10-22

## 2021-09-02 ENCOUNTER — PATIENT MESSAGE (OUTPATIENT)
Dept: FAMILY MEDICINE CLINIC | Facility: CLINIC | Age: 66
End: 2021-09-02

## 2021-09-02 NOTE — TELEPHONE ENCOUNTER
From: Naun Tse  To: Lili Ventura DO  Sent: 9/2/2021 1:01 PM CDT  Subject: Other    I have attached the after visit summary from Dr. Xavi Alvarado from my eye exam on 8/24/2021 in case you do not receive it directly.

## 2021-09-13 RX ORDER — OLMESARTAN MEDOXOMIL 40 MG/1
40 TABLET ORAL DAILY
Qty: 90 TABLET | Refills: 1 | Status: SHIPPED | OUTPATIENT
Start: 2021-09-13 | End: 2022-03-13

## 2021-09-13 RX ORDER — HYDROCHLOROTHIAZIDE 25 MG/1
25 TABLET ORAL DAILY
Qty: 90 TABLET | Refills: 1 | Status: SHIPPED | OUTPATIENT
Start: 2021-09-13 | End: 2022-03-13

## 2021-09-14 ENCOUNTER — HOSPITAL ENCOUNTER (OUTPATIENT)
Dept: CT IMAGING | Age: 66
Discharge: HOME | End: 2021-09-14
Attending: UROLOGY
Payer: MEDICARE

## 2021-09-14 DIAGNOSIS — N13.30 BILATERAL HYDRONEPHROSIS: ICD-10-CM

## 2021-09-14 PROCEDURE — 74176 CT ABD & PELVIS W/O CONTRAST: CPT | Performed by: UROLOGY

## 2021-09-14 NOTE — PROGRESS NOTES
Results reviewed. CT shows that hydronephrosis has improved which is great news. Has small right renal cyst which is benign and nothing further needs to be done for this. No changes to plan as we originally discussed. Please let patient know.     Thank you,

## 2021-09-15 ENCOUNTER — TELEPHONE (OUTPATIENT)
Dept: SURGERY | Facility: CLINIC | Age: 66
End: 2021-09-15

## 2021-09-15 NOTE — TELEPHONE ENCOUNTER
Patient contacted and was given CT results Patient  was very happy with the CT results.  Patient stated that he had a follow up with Lee Moreira in November 2021

## 2021-10-22 ENCOUNTER — LAB ENCOUNTER (OUTPATIENT)
Dept: LAB | Age: 66
End: 2021-10-22
Attending: FAMILY MEDICINE
Payer: MEDICARE

## 2021-10-22 DIAGNOSIS — Z79.4 TYPE 2 DIABETES MELLITUS WITH DIABETIC NEUROPATHY, WITH LONG-TERM CURRENT USE OF INSULIN (HCC): ICD-10-CM

## 2021-10-22 DIAGNOSIS — E11.40 TYPE 2 DIABETES MELLITUS WITH DIABETIC NEUROPATHY, WITH LONG-TERM CURRENT USE OF INSULIN (HCC): ICD-10-CM

## 2021-10-22 DIAGNOSIS — E11.69 DIABETES MELLITUS TYPE 2 IN OBESE (HCC): ICD-10-CM

## 2021-10-22 DIAGNOSIS — R97.20 ELEVATED PSA: ICD-10-CM

## 2021-10-22 DIAGNOSIS — E78.5 DYSLIPIDEMIA: ICD-10-CM

## 2021-10-22 DIAGNOSIS — E66.9 DIABETES MELLITUS TYPE 2 IN OBESE (HCC): ICD-10-CM

## 2021-10-22 PROCEDURE — 80053 COMPREHEN METABOLIC PANEL: CPT

## 2021-10-22 PROCEDURE — 84153 ASSAY OF PSA TOTAL: CPT

## 2021-10-22 PROCEDURE — 80061 LIPID PANEL: CPT

## 2021-10-22 PROCEDURE — 36415 COLL VENOUS BLD VENIPUNCTURE: CPT

## 2021-10-22 PROCEDURE — 83036 HEMOGLOBIN GLYCOSYLATED A1C: CPT

## 2021-10-22 RX ORDER — INSULIN DETEMIR 100 [IU]/ML
INJECTION, SOLUTION SUBCUTANEOUS
Qty: 45 ML | Refills: 1 | Status: SHIPPED | OUTPATIENT
Start: 2021-10-22

## 2021-10-25 ENCOUNTER — OFFICE VISIT (OUTPATIENT)
Dept: FAMILY MEDICINE CLINIC | Facility: CLINIC | Age: 66
End: 2021-10-25
Payer: MEDICARE

## 2021-10-25 ENCOUNTER — TELEPHONE (OUTPATIENT)
Dept: FAMILY MEDICINE CLINIC | Facility: CLINIC | Age: 66
End: 2021-10-25

## 2021-10-25 VITALS
BODY MASS INDEX: 34.47 KG/M2 | OXYGEN SATURATION: 98 % | HEIGHT: 70 IN | DIASTOLIC BLOOD PRESSURE: 62 MMHG | WEIGHT: 240.75 LBS | SYSTOLIC BLOOD PRESSURE: 116 MMHG | HEART RATE: 86 BPM | TEMPERATURE: 99 F | RESPIRATION RATE: 12 BRPM

## 2021-10-25 DIAGNOSIS — J44.9 CHRONIC OBSTRUCTIVE PULMONARY DISEASE, UNSPECIFIED COPD TYPE (HCC): ICD-10-CM

## 2021-10-25 DIAGNOSIS — Z87.39 HISTORY OF GOUT: ICD-10-CM

## 2021-10-25 DIAGNOSIS — E55.9 VITAMIN D DEFICIENCY: ICD-10-CM

## 2021-10-25 DIAGNOSIS — K22.719 BARRETT'S ESOPHAGUS WITH DYSPLASIA: ICD-10-CM

## 2021-10-25 DIAGNOSIS — E78.5 DYSLIPIDEMIA: ICD-10-CM

## 2021-10-25 DIAGNOSIS — Z79.4 TYPE 2 DIABETES MELLITUS WITH DIABETIC NEUROPATHY, WITH LONG-TERM CURRENT USE OF INSULIN (HCC): Primary | ICD-10-CM

## 2021-10-25 DIAGNOSIS — K29.70 GASTRITIS AND GASTRODUODENITIS: ICD-10-CM

## 2021-10-25 DIAGNOSIS — Z71.85 VACCINE COUNSELING: ICD-10-CM

## 2021-10-25 DIAGNOSIS — Z79.899 MEDICATION MANAGEMENT: ICD-10-CM

## 2021-10-25 DIAGNOSIS — K29.90 GASTRITIS AND GASTRODUODENITIS: ICD-10-CM

## 2021-10-25 DIAGNOSIS — E11.65 UNCONTROLLED TYPE 2 DIABETES MELLITUS WITH HYPERGLYCEMIA (HCC): ICD-10-CM

## 2021-10-25 DIAGNOSIS — I35.9 AORTIC VALVE CALCIFICATION: ICD-10-CM

## 2021-10-25 DIAGNOSIS — I05.9 MITRAL VALVE ANNULAR CALCIFICATION: ICD-10-CM

## 2021-10-25 DIAGNOSIS — I10 ESSENTIAL HYPERTENSION: ICD-10-CM

## 2021-10-25 DIAGNOSIS — E66.9 DIABETES MELLITUS TYPE 2 IN OBESE (HCC): ICD-10-CM

## 2021-10-25 DIAGNOSIS — E11.40 TYPE 2 DIABETES MELLITUS WITH DIABETIC NEUROPATHY, WITH LONG-TERM CURRENT USE OF INSULIN (HCC): Primary | ICD-10-CM

## 2021-10-25 DIAGNOSIS — I25.10 CORONARY ARTERY DISEASE INVOLVING NATIVE CORONARY ARTERY OF NATIVE HEART WITHOUT ANGINA PECTORIS: ICD-10-CM

## 2021-10-25 DIAGNOSIS — N40.1 BENIGN PROSTATIC HYPERPLASIA WITH LOWER URINARY TRACT SYMPTOMS, SYMPTOM DETAILS UNSPECIFIED: ICD-10-CM

## 2021-10-25 DIAGNOSIS — I35.0 MILD AORTIC STENOSIS: ICD-10-CM

## 2021-10-25 DIAGNOSIS — F17.200 SMOKING: ICD-10-CM

## 2021-10-25 DIAGNOSIS — E11.69 DIABETES MELLITUS TYPE 2 IN OBESE (HCC): ICD-10-CM

## 2021-10-25 DIAGNOSIS — E66.9 OBESITY (BMI 30-39.9): ICD-10-CM

## 2021-10-25 PROCEDURE — 99214 OFFICE O/P EST MOD 30 MIN: CPT | Performed by: FAMILY MEDICINE

## 2021-10-25 NOTE — TELEPHONE ENCOUNTER
[facility] Le[facility]mir [facility]Touch on 10/25/2021. Waiting for insurance decision.

## 2021-10-25 NOTE — PROGRESS NOTES
HPI:   Long Rodríguez is a 74year old male who presents for recheck of his diabetes, hypertension and dyslipidemia. Patient has  been checking his FBS's at home regularly -- has not checked recently.   Last eye exam -- August 2021  Patient presents for FASTING Yes    LIPID PANEL   Result Value Ref Range    Cholesterol, Total 129 <200 mg/dL    HDL Cholesterol 50 40 - 59 mg/dL    Triglycerides 90 30 - 149 mg/dL    LDL Cholesterol 62 <100 mg/dL    VLDL 13 0 - 30 mg/dL    Non HDL Chol 79 <130 mg/dL    FASTIN HANDIHALER) 18 MCG Inhalation Cap Inhale 1 capsule (18 mcg total) into the lungs daily. 90 capsule 1   • Insulin Pen Needle (BD PEN NEEDLE SHORT U/F) 31G X 8 MM Does not apply Misc Please disregard previous qty on this. Pt uses up to 4x a day.  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   EXTREMITIES: no cyanosis, clubbing or edema; removed shoes and socks  2+ dorsalis pedis pulses bilaterally; skin of the feet examined and intact bilaterally  B (hcc)  Dyslipidemia  Essential hypertension  Chronic obstructive pulmonary disease, unspecified copd type (hcc)  Elizondo's esophagus with dysplasia  Vitamin d deficiency  Coronary artery disease involving native coronary artery of native heart without leonardo

## 2021-10-29 NOTE — TELEPHONE ENCOUNTER
Prior authorization did not go through due to error in finding member. Covermymeds faxed Medicare Drug coverage request form to fax to insurance instead. Faxed on 10/29/2021.

## 2021-11-01 NOTE — TELEPHONE ENCOUNTER
Re-faxed Prior Authorization to THE [facility] pharmacy department on 11/1/2021. Covermymeds key is [id number]. Waiting on response from insurance.

## 2021-11-05 ENCOUNTER — OFFICE VISIT (OUTPATIENT)
Dept: SURGERY | Facility: CLINIC | Age: 66
End: 2021-11-05
Payer: MEDICARE

## 2021-11-05 DIAGNOSIS — N31.9 HYPOTONIC NEUROGENIC BLADDER: ICD-10-CM

## 2021-11-05 DIAGNOSIS — N13.30 BILATERAL HYDRONEPHROSIS: ICD-10-CM

## 2021-11-05 DIAGNOSIS — N40.1 BPH WITH OBSTRUCTION/LOWER URINARY TRACT SYMPTOMS: ICD-10-CM

## 2021-11-05 DIAGNOSIS — N39.0 RECURRENT UTI: ICD-10-CM

## 2021-11-05 DIAGNOSIS — N13.8 BPH WITH OBSTRUCTION/LOWER URINARY TRACT SYMPTOMS: ICD-10-CM

## 2021-11-05 DIAGNOSIS — R82.90 URINE FINDING: Primary | ICD-10-CM

## 2021-11-05 PROCEDURE — 51798 US URINE CAPACITY MEASURE: CPT | Performed by: UROLOGY

## 2021-11-05 PROCEDURE — 99214 OFFICE O/P EST MOD 30 MIN: CPT | Performed by: UROLOGY

## 2021-11-05 PROCEDURE — 81003 URINALYSIS AUTO W/O SCOPE: CPT | Performed by: UROLOGY

## 2021-11-05 RX ORDER — BETHANECHOL CHLORIDE 50 MG/1
50 TABLET ORAL AS DIRECTED
Qty: 180 TABLET | Refills: 5 | Status: SHIPPED | OUTPATIENT
Start: 2021-11-05 | End: 2021-12-06

## 2021-11-05 NOTE — PATIENT INSTRUCTIONS
1. Increase water intake to 40-60 ounces (2 liters) per day or enough to keep urine clear to pale yellow. 2. Continue tamsulosin and finasteride  3. Go back to daily CIC  4. Start bethanechol as directed  5.  If you continue to have difficulty catheterizin

## 2021-11-05 NOTE — PROGRESS NOTES
HPI:     Nba Duggan is a 74year old male with a PMH of obesity, HTN, HL, DM, GERD, COPD, asthma, CAD, hemorrhoids    Following for:  1.  Hypotonic neurogenic bladder with urinary retention requiring CIC  - incidentally found to have urinary rete clear to pale yellow for UTI prevention.     PSA History:  - 0.36 10/22/21  - 3.82 10/8/20  - 3.14 7/18/19  - 4.22 4/19/19  - 2.54 11/9/17  - 2.27 11/29/12    Cr slightly above baseline of 1.44 10/22/21 (was 1.23 7/2/21)    CT SP 9/14/21: resolution of b/l keep urine clear for UTI prevention. Will check UCx today and start abx if positive. F/u in 3 mo with PVR or sooner if issues arise.     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 90 tablet 1   • Olmesartan Medoxomil 40 MG Oral Tab Take 1 tablet (40 mg total) by mouth daily.  90 tablet 1   • tamsulosin (FLOMAX) cap TAKE TWO CAPSULES BY MOUTH DAILY  90 capsule 3   • OMEGA-3-ACID ETHYL ESTERS negatives noted in the the HPI.     PHYSICAL EXAM:     GENERAL APPEARANCE: well, developed, well nourished, in no acute distress  NEUROLOGIC: nonfocal, alert and oriented  HEAD: normocephalic, atraumatic  EYES: sclera non-icteric  EARS: hearing intact  ORAL

## 2021-11-18 NOTE — PROGRESS NOTES
HPI:     Sandra Stewart is a 74year old male with a PMH of obesity, HTN, HL, DM, GERD, COPD, asthma, CAD, hemorrhoids    Following for:  1.  Hypotonic neurogenic bladder with urinary retention requiring CIC  - incidentally found to have urinary rete LOV    UA is negative and PVR is 270 mL. Will cath later today. PVR was > 500 mL LOV. Was > 300 mL prior visit but when checked again was 100 mL - was 641 and 540 mL prior visits and > 2 liters prior to TURP.     Drinks ~ 60 oz water per day and urine typi when sitting down. Unable to void today and PVR is > 500 mL. Was > 300 mL LOV but when checked again was 100 mL - was 641 and 540 mL prior visits and > 2 liters prior to TURP. Given the PVR > 500 mL recommend he go back to daily CIC.  Start bethanech after catheter inserted   • Chronic cough    • COPD (chronic obstructive pulmonary disease) (HCC)    • Diabetes mellitus ([facility] [address] 15-20 years ago   • Disorder of prostate Enlarged for 15 to 20 years ago    Turp scheduled for Merlyn 3   • Esophageal reflux    • retinopathy[other]) Brother    • Cancer Sister         liver   • Cancer Sister         ovarian      Social History: Social History    Tobacco Use      Smoking status: Current Every Day Smoker        Packs/day: 1.00        Years: 30.00        Pack years: 27 Tab Take 1 tablet (5 mg total) by mouth daily. 90 tablet 6   • Glucose Blood (ACCU-CHEK ACTIVE) In Vitro Strip 1 strip by In Vitro route daily.      • Glucose Blood (CONTOUR NEXT TEST) In Vitro Strip 1 each by Other route 4 (four) times daily with meals and bethanechol to 100 mg BID to see if this helps, continue 0.8 mg flomax, proscar. Continue daily CIC. Plan for UDS and office cysto. Discussed rationale for tests and he is agreeable.     I spent over 30 minutes in consultation and coordination of this patie

## 2021-11-26 ENCOUNTER — OFFICE VISIT (OUTPATIENT)
Dept: SURGERY | Facility: CLINIC | Age: 66
End: 2021-11-26
Payer: MEDICARE

## 2021-11-26 DIAGNOSIS — N40.1 BPH WITH OBSTRUCTION/LOWER URINARY TRACT SYMPTOMS: Primary | ICD-10-CM

## 2021-11-26 DIAGNOSIS — N31.9 HYPOTONIC NEUROGENIC BLADDER: ICD-10-CM

## 2021-11-26 DIAGNOSIS — R97.20 ELEVATED PSA: ICD-10-CM

## 2021-11-26 DIAGNOSIS — N13.30 BILATERAL HYDRONEPHROSIS: ICD-10-CM

## 2021-11-26 DIAGNOSIS — N13.8 BPH WITH OBSTRUCTION/LOWER URINARY TRACT SYMPTOMS: Primary | ICD-10-CM

## 2021-11-26 DIAGNOSIS — N39.0 RECURRENT UTI: ICD-10-CM

## 2021-11-26 PROCEDURE — 99214 OFFICE O/P EST MOD 30 MIN: CPT | Performed by: UROLOGY

## 2021-11-26 PROCEDURE — 51798 US URINE CAPACITY MEASURE: CPT | Performed by: UROLOGY

## 2021-11-29 NOTE — PROGRESS NOTES
HPI:     Juju Billings is a 74year old male with a PMH of obesity, HTN, HL, DM, GERD, COPD, asthma, CAD, hemorrhoids    Following for:  1.  Hypotonic neurogenic bladder with urinary retention requiring CIC  - incidentally found to have asymptomatic after 598 mL. Procedure stopped. Pt given permission to void and could not with with max detrusor pressure of 25 cm H2O and PVR of 650 mL. Drinks ~ 60 oz water per day and urine typically light yellow.  We have discussed continuing to drink enough water catheterizing will consider TUR as noted above or perhaps consider repeat TUR later if he continues to retain considerable amounts after next visit.     I spent over 40 minutes in consultation and coordination of this patient's care today with > 50% of time daily CIC. Start bethanechol 50 mg daily and increase to BID if tolerating. Continue flomax, proscar, water intake for UTI prevention. F/u with me in a couple weeks for check-up.  If he is still having difficulty catheterizing and/or markedly elevated PVRs Esophagogastroduodenscopy   • OTHER SURGICAL HISTORY  06/2010    Surgery for ablation of Elizondo's Esophagus   • OTHER SURGICAL HISTORY  01/2010    surgery for granular cell tumor   • OTHER SURGICAL HISTORY  2013    EGD; Dr. Bud Hassan; due in 2 years   • O total) by mouth daily. 90 tablet 1   • Olmesartan Medoxomil 40 MG Oral Tab Take 1 tablet (40 mg total) by mouth daily.  90 tablet 1   • tamsulosin (FLOMAX) cap TAKE TWO CAPSULES BY MOUTH DAILY  90 capsule 3   • OMEGA-3-ACID ETHYL ESTERS 1 g Oral Cap TAKE TW the HPI.     PHYSICAL EXAM:     GENERAL APPEARANCE: well, developed, well nourished, in no acute distress  NEUROLOGIC: nonfocal, alert and oriented  HEAD: normocephalic, atraumatic  EYES: sclera non-icteric  EARS: hearing intact  ORAL CAVITY: mucosa moist

## 2021-12-06 ENCOUNTER — NURSE ONLY (OUTPATIENT)
Dept: SURGERY | Facility: CLINIC | Age: 66
End: 2021-12-06
Payer: MEDICARE

## 2021-12-06 ENCOUNTER — PROCEDURE (OUTPATIENT)
Dept: SURGERY | Facility: CLINIC | Age: 66
End: 2021-12-06
Payer: MEDICARE

## 2021-12-06 VITALS — HEART RATE: 77 BPM | DIASTOLIC BLOOD PRESSURE: 71 MMHG | SYSTOLIC BLOOD PRESSURE: 129 MMHG

## 2021-12-06 DIAGNOSIS — N13.8 BPH WITH OBSTRUCTION/LOWER URINARY TRACT SYMPTOMS: Primary | ICD-10-CM

## 2021-12-06 DIAGNOSIS — N39.0 RECURRENT UTI: ICD-10-CM

## 2021-12-06 DIAGNOSIS — R33.9 URINARY RETENTION: ICD-10-CM

## 2021-12-06 DIAGNOSIS — N31.9 HYPOTONIC NEUROGENIC BLADDER: ICD-10-CM

## 2021-12-06 DIAGNOSIS — R82.90 URINE FINDING: Primary | ICD-10-CM

## 2021-12-06 DIAGNOSIS — N13.30 BILATERAL HYDRONEPHROSIS: ICD-10-CM

## 2021-12-06 DIAGNOSIS — R97.20 ELEVATED PSA: ICD-10-CM

## 2021-12-06 DIAGNOSIS — N40.1 BPH WITH OBSTRUCTION/LOWER URINARY TRACT SYMPTOMS: Primary | ICD-10-CM

## 2021-12-06 PROCEDURE — 51729 CYSTOMETROGRAM W/VP&UP: CPT | Performed by: UROLOGY

## 2021-12-06 PROCEDURE — 51741 ELECTRO-UROFLOWMETRY FIRST: CPT | Performed by: UROLOGY

## 2021-12-06 PROCEDURE — 51784 ANAL/URINARY MUSCLE STUDY: CPT | Performed by: UROLOGY

## 2021-12-06 PROCEDURE — 51797 INTRAABDOMINAL PRESSURE TEST: CPT | Performed by: UROLOGY

## 2021-12-06 PROCEDURE — 81003 URINALYSIS AUTO W/O SCOPE: CPT | Performed by: UROLOGY

## 2021-12-06 PROCEDURE — 52000 CYSTOURETHROSCOPY: CPT | Performed by: UROLOGY

## 2021-12-06 PROCEDURE — 99215 OFFICE O/P EST HI 40 MIN: CPT | Performed by: UROLOGY

## 2021-12-06 RX ORDER — BETHANECHOL CHLORIDE 50 MG/1
50 TABLET ORAL 2 TIMES DAILY
Qty: 360 TABLET | Refills: 5 | Status: SHIPPED | OUTPATIENT
Start: 2021-12-06

## 2021-12-06 RX ORDER — CIPROFLOXACIN 500 MG/1
500 TABLET, FILM COATED ORAL ONCE
Status: COMPLETED | OUTPATIENT
Start: 2021-12-06 | End: 2021-12-06

## 2021-12-06 RX ADMIN — CIPROFLOXACIN 500 MG: 500 TABLET, FILM COATED ORAL at 13:45:00

## 2021-12-29 ENCOUNTER — TELEPHONE (OUTPATIENT)
Dept: SURGERY | Facility: CLINIC | Age: 66
End: 2021-12-29

## 2021-12-29 RX ORDER — BETHANECHOL CHLORIDE 50 MG/1
100 TABLET ORAL 2 TIMES DAILY
Qty: 360 TABLET | Refills: 5 | Status: SHIPPED | OUTPATIENT
Start: 2021-12-29

## 2021-12-29 NOTE — TELEPHONE ENCOUNTER
This RN called [facility] to clarify that status of the medication. Per Yola Calvillo, this Bethanechol was picked up on 11/29 therefore patient should have enough until Feb.       RN reviewed MD's notes. He was last seen on 12/6.  It states, \"  Bethanechol Chlori

## 2021-12-29 NOTE — TELEPHONE ENCOUNTER
Per pt is unable to fill Bethanechol Chloride, pharmacy is stating the instructions need to be updated due to rx changing.  Please advise

## 2021-12-30 ENCOUNTER — PATIENT MESSAGE (OUTPATIENT)
Dept: SURGERY | Facility: CLINIC | Age: 66
End: 2021-12-30

## 2021-12-30 NOTE — TELEPHONE ENCOUNTER
From: Anita Valentino  To: Jeanmarie Dick MD  Sent: 12/30/2021 9:27 AM CST  Subject: Bethanechol Chloride 50MG    On Nov. 5 the initial order was placed for 180 pills: 1 pill twice a day. On Nov. 26 was instructed to take 2 pills twice a day.  On Nov 6 af

## 2021-12-30 NOTE — TELEPHONE ENCOUNTER
RN called patient . Not available. Spoke wife. Relayed MD huizar new order to pharmacy. All questions answered and she verbalized understanding.

## 2022-01-29 DIAGNOSIS — E11.69 DIABETES MELLITUS TYPE 2 IN OBESE (HCC): Primary | ICD-10-CM

## 2022-01-29 DIAGNOSIS — E66.9 DIABETES MELLITUS TYPE 2 IN OBESE (HCC): Primary | ICD-10-CM

## 2022-02-23 RX ORDER — TAMSULOSIN HYDROCHLORIDE 0.4 MG/1
0.8 CAPSULE ORAL DAILY
Qty: 90 CAPSULE | Refills: 3 | Status: SHIPPED | OUTPATIENT
Start: 2022-02-23 | End: 2022-08-22

## 2022-03-03 ENCOUNTER — LAB ENCOUNTER (OUTPATIENT)
Dept: LAB | Age: 67
End: 2022-03-03
Attending: FAMILY MEDICINE
Payer: MEDICARE

## 2022-03-03 DIAGNOSIS — E11.69 DIABETES MELLITUS TYPE 2 IN OBESE (HCC): ICD-10-CM

## 2022-03-03 DIAGNOSIS — E66.9 DIABETES MELLITUS TYPE 2 IN OBESE (HCC): ICD-10-CM

## 2022-03-03 DIAGNOSIS — E11.40 TYPE 2 DIABETES MELLITUS WITH DIABETIC NEUROPATHY, WITH LONG-TERM CURRENT USE OF INSULIN (HCC): ICD-10-CM

## 2022-03-03 DIAGNOSIS — Z79.4 TYPE 2 DIABETES MELLITUS WITH DIABETIC NEUROPATHY, WITH LONG-TERM CURRENT USE OF INSULIN (HCC): ICD-10-CM

## 2022-03-03 DIAGNOSIS — E78.5 DYSLIPIDEMIA: ICD-10-CM

## 2022-03-03 DIAGNOSIS — Z87.39 HISTORY OF GOUT: ICD-10-CM

## 2022-03-03 LAB
ALBUMIN SERPL-MCNC: 3.7 G/DL (ref 3.4–5)
ALBUMIN/GLOB SERPL: 1.1 {RATIO} (ref 1–2)
ALP LIVER SERPL-CCNC: 99 U/L
ALT SERPL-CCNC: 33 U/L
ANION GAP SERPL CALC-SCNC: 3 MMOL/L (ref 0–18)
AST SERPL-CCNC: 19 U/L (ref 15–37)
BILIRUB SERPL-MCNC: 0.5 MG/DL (ref 0.1–2)
BUN BLD-MCNC: 33 MG/DL (ref 7–18)
CALCIUM BLD-MCNC: 9.1 MG/DL (ref 8.5–10.1)
CHLORIDE SERPL-SCNC: 105 MMOL/L (ref 98–112)
CHOLEST SERPL-MCNC: 152 MG/DL (ref ?–200)
CO2 SERPL-SCNC: 27 MMOL/L (ref 21–32)
CREAT BLD-MCNC: 1.13 MG/DL
CREAT UR-SCNC: 62.6 MG/DL
EST. AVERAGE GLUCOSE BLD GHB EST-MCNC: 177 MG/DL (ref 68–126)
FASTING PATIENT LIPID ANSWER: YES
FASTING STATUS PATIENT QL REPORTED: YES
GLOBULIN PLAS-MCNC: 3.3 G/DL (ref 2.8–4.4)
GLUCOSE BLD-MCNC: 227 MG/DL (ref 70–99)
HBA1C MFR BLD: 7.8 % (ref ?–5.7)
HDLC SERPL-MCNC: 50 MG/DL (ref 40–59)
LDLC SERPL CALC-MCNC: 77 MG/DL (ref ?–100)
MICROALBUMIN UR-MCNC: 3.32 MG/DL
MICROALBUMIN/CREAT 24H UR-RTO: 53 UG/MG (ref ?–30)
NONHDLC SERPL-MCNC: 102 MG/DL (ref ?–130)
OSMOLALITY SERPL CALC.SUM OF ELEC: 294 MOSM/KG (ref 275–295)
PROT SERPL-MCNC: 7 G/DL (ref 6.4–8.2)
SODIUM SERPL-SCNC: 135 MMOL/L (ref 136–145)
TRIGL SERPL-MCNC: 141 MG/DL (ref 30–149)
URATE SERPL-MCNC: 7.8 MG/DL
VLDLC SERPL CALC-MCNC: 22 MG/DL (ref 0–30)

## 2022-03-03 PROCEDURE — 80061 LIPID PANEL: CPT

## 2022-03-03 PROCEDURE — 80053 COMPREHEN METABOLIC PANEL: CPT

## 2022-03-03 PROCEDURE — 83036 HEMOGLOBIN GLYCOSYLATED A1C: CPT

## 2022-03-03 PROCEDURE — 82043 UR ALBUMIN QUANTITATIVE: CPT

## 2022-03-03 PROCEDURE — 82570 ASSAY OF URINE CREATININE: CPT

## 2022-03-03 PROCEDURE — 84550 ASSAY OF BLOOD/URIC ACID: CPT

## 2022-03-09 ENCOUNTER — OFFICE VISIT (OUTPATIENT)
Dept: FAMILY MEDICINE CLINIC | Facility: CLINIC | Age: 67
End: 2022-03-09
Payer: MEDICARE

## 2022-03-09 VITALS
TEMPERATURE: 98 F | OXYGEN SATURATION: 98 % | BODY MASS INDEX: 34.97 KG/M2 | WEIGHT: 244.25 LBS | RESPIRATION RATE: 16 BRPM | SYSTOLIC BLOOD PRESSURE: 126 MMHG | DIASTOLIC BLOOD PRESSURE: 78 MMHG | HEIGHT: 70 IN | HEART RATE: 99 BPM

## 2022-03-09 DIAGNOSIS — E66.9 OBESITY (BMI 30-39.9): ICD-10-CM

## 2022-03-09 DIAGNOSIS — N13.8 BPH WITH URINARY OBSTRUCTION: ICD-10-CM

## 2022-03-09 DIAGNOSIS — N40.1 BPH WITH URINARY OBSTRUCTION: ICD-10-CM

## 2022-03-09 DIAGNOSIS — I05.9 MITRAL VALVE ANNULAR CALCIFICATION: ICD-10-CM

## 2022-03-09 DIAGNOSIS — J44.9 CHRONIC OBSTRUCTIVE PULMONARY DISEASE, UNSPECIFIED COPD TYPE (HCC): ICD-10-CM

## 2022-03-09 DIAGNOSIS — E11.40 TYPE 2 DIABETES MELLITUS WITH DIABETIC NEUROPATHY, WITH LONG-TERM CURRENT USE OF INSULIN (HCC): ICD-10-CM

## 2022-03-09 DIAGNOSIS — K82.3 CHOLECYSTODUODENAL FISTULA: ICD-10-CM

## 2022-03-09 DIAGNOSIS — I35.9 AORTIC VALVE CALCIFICATION: ICD-10-CM

## 2022-03-09 DIAGNOSIS — I25.10 CORONARY ARTERY DISEASE INVOLVING NATIVE CORONARY ARTERY OF NATIVE HEART WITHOUT ANGINA PECTORIS: ICD-10-CM

## 2022-03-09 DIAGNOSIS — E66.9 DIABETES MELLITUS TYPE 2 IN OBESE (HCC): ICD-10-CM

## 2022-03-09 DIAGNOSIS — E11.65 UNCONTROLLED TYPE 2 DIABETES MELLITUS WITH HYPERGLYCEMIA (HCC): ICD-10-CM

## 2022-03-09 DIAGNOSIS — F17.200 SMOKING: ICD-10-CM

## 2022-03-09 DIAGNOSIS — K80.20 GALLSTONES: ICD-10-CM

## 2022-03-09 DIAGNOSIS — K57.30 DIVERTICULOSIS OF COLON: ICD-10-CM

## 2022-03-09 DIAGNOSIS — Z13.31 DEPRESSION SCREENING: ICD-10-CM

## 2022-03-09 DIAGNOSIS — Z12.5 SCREENING FOR PROSTATE CANCER: ICD-10-CM

## 2022-03-09 DIAGNOSIS — G47.10 HYPERSOMNOLENCE: ICD-10-CM

## 2022-03-09 DIAGNOSIS — Z79.899 MEDICATION MANAGEMENT: ICD-10-CM

## 2022-03-09 DIAGNOSIS — Z87.891 PERSONAL HISTORY OF NICOTINE DEPENDENCE: ICD-10-CM

## 2022-03-09 DIAGNOSIS — Z80.0 FAMILY HISTORY OF MALIGNANT NEOPLASM OF GASTROINTESTINAL TRACT: ICD-10-CM

## 2022-03-09 DIAGNOSIS — Z87.39 HISTORY OF GOUT: ICD-10-CM

## 2022-03-09 DIAGNOSIS — T14.8XXA MUSCLE STRAIN: ICD-10-CM

## 2022-03-09 DIAGNOSIS — E55.9 VITAMIN D DEFICIENCY: ICD-10-CM

## 2022-03-09 DIAGNOSIS — Z87.19 HISTORY OF HEMORRHOIDS: ICD-10-CM

## 2022-03-09 DIAGNOSIS — K29.70 GASTRITIS AND GASTRODUODENITIS: ICD-10-CM

## 2022-03-09 DIAGNOSIS — I35.0 MILD AORTIC STENOSIS: ICD-10-CM

## 2022-03-09 DIAGNOSIS — K44.9 DIAPHRAGMATIC HERNIA WITHOUT OBSTRUCTION AND WITHOUT GANGRENE: ICD-10-CM

## 2022-03-09 DIAGNOSIS — E10.9 INSULIN DEPENDENT DIABETES MELLITUS TYPE IA (HCC): ICD-10-CM

## 2022-03-09 DIAGNOSIS — E11.69 DIABETES MELLITUS TYPE 2 IN OBESE (HCC): ICD-10-CM

## 2022-03-09 DIAGNOSIS — Z79.4 TYPE 2 DIABETES MELLITUS WITH DIABETIC NEUROPATHY, WITH LONG-TERM CURRENT USE OF INSULIN (HCC): ICD-10-CM

## 2022-03-09 DIAGNOSIS — K29.90 GASTRITIS AND GASTRODUODENITIS: ICD-10-CM

## 2022-03-09 DIAGNOSIS — K22.719 BARRETT'S ESOPHAGUS WITH DYSPLASIA: ICD-10-CM

## 2022-03-09 DIAGNOSIS — Z00.00 ENCOUNTER FOR ANNUAL HEALTH EXAMINATION: Primary | ICD-10-CM

## 2022-03-09 DIAGNOSIS — D17.22 BENIGN LIPOMATOUS NEOPLASM OF SKIN AND SUBCUTANEOUS TISSUE OF LEFT ARM: ICD-10-CM

## 2022-03-09 DIAGNOSIS — B35.1 ONYCHOMYCOSIS: ICD-10-CM

## 2022-03-09 DIAGNOSIS — Z71.85 VACCINE COUNSELING: ICD-10-CM

## 2022-03-09 DIAGNOSIS — I10 ESSENTIAL HYPERTENSION: ICD-10-CM

## 2022-03-09 DIAGNOSIS — N40.1 BENIGN PROSTATIC HYPERPLASIA WITH LOWER URINARY TRACT SYMPTOMS, SYMPTOM DETAILS UNSPECIFIED: ICD-10-CM

## 2022-03-09 DIAGNOSIS — E78.5 DYSLIPIDEMIA: ICD-10-CM

## 2022-03-09 PROCEDURE — G0447 BEHAVIOR COUNSEL OBESITY 15M: HCPCS | Performed by: FAMILY MEDICINE

## 2022-03-09 PROCEDURE — G0439 PPPS, SUBSEQ VISIT: HCPCS | Performed by: FAMILY MEDICINE

## 2022-03-09 PROCEDURE — 99214 OFFICE O/P EST MOD 30 MIN: CPT | Performed by: FAMILY MEDICINE

## 2022-03-09 PROCEDURE — G0444 DEPRESSION SCREEN ANNUAL: HCPCS | Performed by: FAMILY MEDICINE

## 2022-03-09 RX ORDER — TERBINAFINE HYDROCHLORIDE 250 MG/1
250 TABLET ORAL DAILY
Qty: 30 TABLET | Refills: 2 | Status: SHIPPED | OUTPATIENT
Start: 2022-03-09 | End: 2022-06-01

## 2022-03-13 RX ORDER — HYDROCHLOROTHIAZIDE 25 MG/1
25 TABLET ORAL DAILY
Qty: 90 TABLET | Refills: 1 | Status: SHIPPED | OUTPATIENT
Start: 2022-03-13 | End: 2022-09-07

## 2022-03-13 RX ORDER — OLMESARTAN MEDOXOMIL 40 MG/1
40 TABLET ORAL DAILY
Qty: 90 TABLET | Refills: 1 | Status: SHIPPED | OUTPATIENT
Start: 2022-03-13 | End: 2022-09-07

## 2022-03-16 ENCOUNTER — APPOINTMENT (OUTPATIENT)
Dept: CARDIOLOGY | Age: 67
End: 2022-03-16

## 2022-03-28 RX ORDER — EZETIMIBE AND SIMVASTATIN 10; 40 MG/1; MG/1
1 TABLET ORAL EVERY OTHER DAY
Qty: 45 TABLET | Refills: 1 | Status: SHIPPED | OUTPATIENT
Start: 2022-03-28

## 2022-03-28 RX ORDER — OMEGA-3-ACID ETHYL ESTERS 1 G/1
CAPSULE, LIQUID FILLED ORAL
Qty: 360 CAPSULE | Refills: 1 | Status: SHIPPED | OUTPATIENT
Start: 2022-03-28

## 2022-05-03 ENCOUNTER — LAB ENCOUNTER (OUTPATIENT)
Dept: LAB | Age: 67
End: 2022-05-03
Attending: FAMILY MEDICINE
Payer: MEDICARE

## 2022-05-03 DIAGNOSIS — B35.1 ONYCHOMYCOSIS: ICD-10-CM

## 2022-05-03 LAB
ALBUMIN SERPL-MCNC: 3.6 G/DL (ref 3.4–5)
ALP LIVER SERPL-CCNC: 103 U/L
ALT SERPL-CCNC: 31 U/L
AST SERPL-CCNC: 18 U/L (ref 15–37)
BILIRUB DIRECT SERPL-MCNC: <0.1 MG/DL (ref 0–0.2)
BILIRUB SERPL-MCNC: 0.3 MG/DL (ref 0.1–2)
PROT SERPL-MCNC: 7.1 G/DL (ref 6.4–8.2)

## 2022-05-03 PROCEDURE — 80076 HEPATIC FUNCTION PANEL: CPT

## 2022-05-04 NOTE — PROGRESS NOTES
Dear Radha Arrington,    Your liver enzymes are within normal limits.     Sincerely,  Dr. Geo Greene

## 2022-05-10 ENCOUNTER — APPOINTMENT (OUTPATIENT)
Dept: GENERAL RADIOLOGY | Age: 67
End: 2022-05-10
Attending: EMERGENCY MEDICINE
Payer: MEDICARE

## 2022-05-10 ENCOUNTER — HOSPITAL ENCOUNTER (OUTPATIENT)
Age: 67
Discharge: HOME | End: 2022-05-10
Attending: EMERGENCY MEDICINE
Payer: MEDICARE

## 2022-05-10 VITALS
HEART RATE: 81 BPM | TEMPERATURE: 98 F | OXYGEN SATURATION: 97 % | SYSTOLIC BLOOD PRESSURE: 135 MMHG | DIASTOLIC BLOOD PRESSURE: 52 MMHG | RESPIRATION RATE: 16 BRPM

## 2022-05-10 DIAGNOSIS — S20.212A CONTUSION OF LEFT CHEST WALL, INITIAL ENCOUNTER: Primary | ICD-10-CM

## 2022-05-10 PROCEDURE — 99213 OFFICE O/P EST LOW 20 MIN: CPT

## 2022-05-10 PROCEDURE — 71101 X-RAY EXAM UNILAT RIBS/CHEST: CPT | Performed by: EMERGENCY MEDICINE

## 2022-05-10 NOTE — ED INITIAL ASSESSMENT (HPI)
C/o fell yesterday at home, left rib area hit the corner wood railing of the gazebo- with left rib pain and some bruising.

## 2022-05-16 RX ORDER — INSULIN DETEMIR 100 [IU]/ML
INJECTION, SOLUTION SUBCUTANEOUS
Qty: 45 ML | Refills: 0 | Status: SHIPPED | OUTPATIENT
Start: 2022-05-16

## 2022-06-01 ENCOUNTER — OFFICE VISIT (OUTPATIENT)
Dept: SURGERY | Facility: CLINIC | Age: 67
End: 2022-06-01
Payer: MEDICARE

## 2022-06-01 DIAGNOSIS — R97.20 ELEVATED PSA: ICD-10-CM

## 2022-06-01 DIAGNOSIS — N13.30 BILATERAL HYDRONEPHROSIS: ICD-10-CM

## 2022-06-01 DIAGNOSIS — N39.0 RECURRENT UTI: ICD-10-CM

## 2022-06-01 DIAGNOSIS — N13.8 BPH WITH OBSTRUCTION/LOWER URINARY TRACT SYMPTOMS: Primary | ICD-10-CM

## 2022-06-01 DIAGNOSIS — N31.9 HYPOTONIC NEUROGENIC BLADDER: ICD-10-CM

## 2022-06-01 DIAGNOSIS — N40.1 BPH WITH OBSTRUCTION/LOWER URINARY TRACT SYMPTOMS: Primary | ICD-10-CM

## 2022-06-01 LAB
APPEARANCE: CLEAR
BILIRUBIN: NEGATIVE
GLUCOSE (URINE DIPSTICK): NEGATIVE MG/DL
KETONES (URINE DIPSTICK): NEGATIVE MG/DL
LEUKOCYTES: NEGATIVE
MULTISTIX LOT#: NORMAL NUMERIC
NITRITE, URINE: NEGATIVE
OCCULT BLOOD: NEGATIVE
PH, URINE: 5 (ref 4.5–8)
PROTEIN (URINE DIPSTICK): NEGATIVE MG/DL
SPECIFIC GRAVITY: 1.02 (ref 1–1.03)
URINE-COLOR: YELLOW
UROBILINOGEN,SEMI-QN: 0.2 MG/DL (ref 0–1.9)

## 2022-06-01 PROCEDURE — 99214 OFFICE O/P EST MOD 30 MIN: CPT | Performed by: UROLOGY

## 2022-06-01 PROCEDURE — 81003 URINALYSIS AUTO W/O SCOPE: CPT | Performed by: UROLOGY

## 2022-06-01 RX ORDER — FINASTERIDE 5 MG/1
5 TABLET, FILM COATED ORAL DAILY
Qty: 90 TABLET | Refills: 6 | Status: SHIPPED | OUTPATIENT
Start: 2022-06-01

## 2022-06-01 RX ORDER — TAMSULOSIN HYDROCHLORIDE 0.4 MG/1
0.8 CAPSULE ORAL DAILY
Qty: 90 CAPSULE | Refills: 3 | Status: SHIPPED | OUTPATIENT
Start: 2022-06-01 | End: 2022-11-28

## 2022-06-01 RX ORDER — BETHANECHOL CHLORIDE 50 MG/1
100 TABLET ORAL 2 TIMES DAILY
Qty: 360 TABLET | Refills: 5 | Status: SHIPPED | OUTPATIENT
Start: 2022-06-01

## 2022-07-01 NOTE — TELEPHONE ENCOUNTER
6/3/2021 CYSTOSCOPY TRANSURETHRAL RESECTION PROSTATE    Pt is asking if he can take his hydrochlorothiazide 25 mg tomorrow before procedure. He states he received conflicting information and is asking for clarification. MPH please advise, thanks.
I called the pt and discussed that he can take his hydrochlorothiazide tomorrow. Pt verbalized understanding and all questions were answered.
I spoke to Day from pre admission testing and she stated that the pt should take his hydrochlorothiazide tomorrow.
I would touch base with either Gino Westbrook or pre-op for these questions. I think this is anesthesia dependent. Try touching base with either Gino Westbrook or pre-op and if you can't reach then I would recommend he not take.
Pt calling re letter he received re pre op instructions - and stopping diuretic
34

## 2022-07-15 ENCOUNTER — LAB ENCOUNTER (OUTPATIENT)
Dept: LAB | Age: 67
End: 2022-07-15
Attending: FAMILY MEDICINE
Payer: MEDICARE

## 2022-07-15 DIAGNOSIS — B35.1 ONYCHOMYCOSIS: ICD-10-CM

## 2022-07-15 DIAGNOSIS — I10 ESSENTIAL HYPERTENSION: ICD-10-CM

## 2022-07-15 DIAGNOSIS — E11.40 TYPE 2 DIABETES MELLITUS WITH DIABETIC NEUROPATHY, WITH LONG-TERM CURRENT USE OF INSULIN (HCC): ICD-10-CM

## 2022-07-15 DIAGNOSIS — Z87.39 HISTORY OF GOUT: ICD-10-CM

## 2022-07-15 DIAGNOSIS — E11.65 UNCONTROLLED TYPE 2 DIABETES MELLITUS WITH HYPERGLYCEMIA (HCC): ICD-10-CM

## 2022-07-15 DIAGNOSIS — E78.5 DYSLIPIDEMIA: ICD-10-CM

## 2022-07-15 DIAGNOSIS — Z79.4 TYPE 2 DIABETES MELLITUS WITH DIABETIC NEUROPATHY, WITH LONG-TERM CURRENT USE OF INSULIN (HCC): ICD-10-CM

## 2022-07-15 LAB
ALBUMIN SERPL-MCNC: 3.5 G/DL (ref 3.4–5)
ALBUMIN/GLOB SERPL: 0.9 {RATIO} (ref 1–2)
ALP LIVER SERPL-CCNC: 85 U/L
ALT SERPL-CCNC: 27 U/L
ANION GAP SERPL CALC-SCNC: 6 MMOL/L (ref 0–18)
AST SERPL-CCNC: 17 U/L (ref 15–37)
BILIRUB SERPL-MCNC: 0.5 MG/DL (ref 0.1–2)
BUN BLD-MCNC: 35 MG/DL (ref 7–18)
CALCIUM BLD-MCNC: 9 MG/DL (ref 8.5–10.1)
CHLORIDE SERPL-SCNC: 105 MMOL/L (ref 98–112)
CHOLEST SERPL-MCNC: 136 MG/DL (ref ?–200)
CO2 SERPL-SCNC: 25 MMOL/L (ref 21–32)
CREAT BLD-MCNC: 1.29 MG/DL
CREAT UR-SCNC: 55.8 MG/DL
EST. AVERAGE GLUCOSE BLD GHB EST-MCNC: 177 MG/DL (ref 68–126)
FASTING PATIENT LIPID ANSWER: YES
FASTING STATUS PATIENT QL REPORTED: YES
GLOBULIN PLAS-MCNC: 3.9 G/DL (ref 2.8–4.4)
GLUCOSE BLD-MCNC: 221 MG/DL (ref 70–99)
HBA1C MFR BLD: 7.8 % (ref ?–5.7)
HDLC SERPL-MCNC: 45 MG/DL (ref 40–59)
LDLC SERPL CALC-MCNC: 65 MG/DL (ref ?–100)
MICROALBUMIN UR-MCNC: 1.12 MG/DL
MICROALBUMIN/CREAT 24H UR-RTO: 20.1 UG/MG (ref ?–30)
NONHDLC SERPL-MCNC: 91 MG/DL (ref ?–130)
OSMOLALITY SERPL CALC.SUM OF ELEC: 297 MOSM/KG (ref 275–295)
POTASSIUM SERPL-SCNC: 4.8 MMOL/L (ref 3.5–5.1)
PROT SERPL-MCNC: 7.4 G/DL (ref 6.4–8.2)
SODIUM SERPL-SCNC: 136 MMOL/L (ref 136–145)
TRIGL SERPL-MCNC: 152 MG/DL (ref 30–149)
URATE SERPL-MCNC: 9.5 MG/DL
VLDLC SERPL CALC-MCNC: 23 MG/DL (ref 0–30)

## 2022-07-15 PROCEDURE — 80053 COMPREHEN METABOLIC PANEL: CPT

## 2022-07-15 PROCEDURE — 82570 ASSAY OF URINE CREATININE: CPT

## 2022-07-15 PROCEDURE — 84550 ASSAY OF BLOOD/URIC ACID: CPT

## 2022-07-15 PROCEDURE — 80061 LIPID PANEL: CPT

## 2022-07-15 PROCEDURE — 83036 HEMOGLOBIN GLYCOSYLATED A1C: CPT

## 2022-07-15 PROCEDURE — 82043 UR ALBUMIN QUANTITATIVE: CPT

## 2022-07-18 PROBLEM — E10.43 AUTONOMIC NEUROPATHY DUE TO TYPE 1 DIABETES MELLITUS (HCC): Status: ACTIVE | Noted: 2022-03-15

## 2022-07-18 PROBLEM — E66.01 MORBID (SEVERE) OBESITY DUE TO EXCESS CALORIES (HCC): Status: ACTIVE | Noted: 2022-07-18

## 2022-07-26 ENCOUNTER — HOSPITAL ENCOUNTER (OUTPATIENT)
Dept: CT IMAGING | Age: 67
Discharge: HOME | End: 2022-07-26
Attending: FAMILY MEDICINE
Payer: MEDICARE

## 2022-07-26 DIAGNOSIS — E66.9 DIABETES MELLITUS TYPE 2 IN OBESE (HCC): ICD-10-CM

## 2022-07-26 DIAGNOSIS — E11.69 DIABETES MELLITUS TYPE 2 IN OBESE (HCC): ICD-10-CM

## 2022-07-26 DIAGNOSIS — Z87.891 PERSONAL HISTORY OF NICOTINE DEPENDENCE: ICD-10-CM

## 2022-07-26 DIAGNOSIS — F17.200 CURRENT SMOKER: ICD-10-CM

## 2022-07-26 PROCEDURE — 71260 CT THORAX DX C+: CPT | Performed by: FAMILY MEDICINE

## 2022-08-02 DIAGNOSIS — B35.1 ONYCHOMYCOSIS: ICD-10-CM

## 2022-08-02 RX ORDER — TERBINAFINE HYDROCHLORIDE 250 MG/1
TABLET ORAL
Qty: 30 TABLET | Refills: 0 | Status: SHIPPED | OUTPATIENT
Start: 2022-08-02

## 2022-08-27 DIAGNOSIS — E11.40 TYPE 2 DIABETES MELLITUS WITH DIABETIC NEUROPATHY, WITH LONG-TERM CURRENT USE OF INSULIN (HCC): ICD-10-CM

## 2022-08-27 DIAGNOSIS — Z79.4 TYPE 2 DIABETES MELLITUS WITH DIABETIC NEUROPATHY, WITH LONG-TERM CURRENT USE OF INSULIN (HCC): ICD-10-CM

## 2022-08-27 DIAGNOSIS — B35.1 ONYCHOMYCOSIS: ICD-10-CM

## 2022-08-27 RX ORDER — INSULIN DETEMIR 100 [IU]/ML
INJECTION, SOLUTION SUBCUTANEOUS
Qty: 45 ML | Refills: 0 | Status: SHIPPED | OUTPATIENT
Start: 2022-08-27

## 2022-08-27 RX ORDER — TERBINAFINE HYDROCHLORIDE 250 MG/1
TABLET ORAL
Qty: 30 TABLET | Refills: 0 | Status: SHIPPED | OUTPATIENT
Start: 2022-08-27

## 2022-08-31 ENCOUNTER — MED REC SCAN ONLY (OUTPATIENT)
Dept: FAMILY MEDICINE CLINIC | Facility: CLINIC | Age: 67
End: 2022-08-31

## 2022-09-02 ENCOUNTER — TELEPHONE (OUTPATIENT)
Dept: SURGERY | Facility: CLINIC | Age: 67
End: 2022-09-02

## 2022-09-02 ENCOUNTER — OFFICE VISIT (OUTPATIENT)
Dept: SURGERY | Facility: CLINIC | Age: 67
End: 2022-09-02
Payer: MEDICARE

## 2022-09-02 DIAGNOSIS — N39.0 RECURRENT UTI: ICD-10-CM

## 2022-09-02 DIAGNOSIS — N13.30 BILATERAL HYDRONEPHROSIS: ICD-10-CM

## 2022-09-02 DIAGNOSIS — N13.8 BPH WITH OBSTRUCTION/LOWER URINARY TRACT SYMPTOMS: Primary | ICD-10-CM

## 2022-09-02 DIAGNOSIS — Z12.5 SCREENING PSA (PROSTATE SPECIFIC ANTIGEN): ICD-10-CM

## 2022-09-02 DIAGNOSIS — N31.9 HYPOTONIC NEUROGENIC BLADDER: ICD-10-CM

## 2022-09-02 DIAGNOSIS — N40.1 BPH WITH OBSTRUCTION/LOWER URINARY TRACT SYMPTOMS: Primary | ICD-10-CM

## 2022-09-02 DIAGNOSIS — R33.9 URINARY RETENTION: ICD-10-CM

## 2022-09-02 DIAGNOSIS — R97.20 ELEVATED PSA: ICD-10-CM

## 2022-09-02 DIAGNOSIS — R82.90 CLOUDY URINE: ICD-10-CM

## 2022-09-02 LAB
APPEARANCE: CLEAR
BILIRUBIN: NEGATIVE
GLUCOSE (URINE DIPSTICK): 250 MG/DL
KETONES (URINE DIPSTICK): NEGATIVE MG/DL
MULTISTIX LOT#: ABNORMAL NUMERIC
NITRITE, URINE: POSITIVE
OCCULT BLOOD: NEGATIVE
PH, URINE: 6 (ref 4.5–8)
PROTEIN (URINE DIPSTICK): NEGATIVE MG/DL
SPECIFIC GRAVITY: 1.01 (ref 1–1.03)
URINE-COLOR: YELLOW
UROBILINOGEN,SEMI-QN: 0.2 MG/DL (ref 0–1.9)

## 2022-09-02 PROCEDURE — 87086 URINE CULTURE/COLONY COUNT: CPT | Performed by: UROLOGY

## 2022-09-02 PROCEDURE — 87077 CULTURE AEROBIC IDENTIFY: CPT | Performed by: UROLOGY

## 2022-09-05 NOTE — PROGRESS NOTES
Results reviewed. Please inform patient his Ucx grew some bacteria. Please check to see if he's having UTI symptoms. If no symptoms he does not need to do anything differently. If he has symptoms I sent in abx which he can start.     Thanks,  MPH

## 2022-09-06 DIAGNOSIS — E11.69 DIABETES MELLITUS TYPE 2 IN OBESE: ICD-10-CM

## 2022-09-06 DIAGNOSIS — E66.9 DIABETES MELLITUS TYPE 2 IN OBESE: ICD-10-CM

## 2022-09-07 RX ORDER — HYDROCHLOROTHIAZIDE 25 MG/1
TABLET ORAL
Qty: 90 TABLET | Refills: 0 | Status: SHIPPED | OUTPATIENT
Start: 2022-09-07

## 2022-09-07 RX ORDER — OLMESARTAN MEDOXOMIL 40 MG/1
TABLET ORAL
Qty: 90 TABLET | Refills: 0 | Status: SHIPPED | OUTPATIENT
Start: 2022-09-07

## 2022-09-29 DIAGNOSIS — B35.1 ONYCHOMYCOSIS: ICD-10-CM

## 2022-09-29 NOTE — TELEPHONE ENCOUNTER
Terbinafine Hydrochloride 250 Mg Tab Nort  LOV: 7/18/2022 for DM/Nicotine use    UPCOMING APPT: 10/31/2022    LAST REFILL: 8/27/2022  QTY:  30  / 0 REFILLS      RX pended, please review if applicable. Thank You!

## 2022-09-30 RX ORDER — TERBINAFINE HYDROCHLORIDE 250 MG/1
TABLET ORAL
Qty: 30 TABLET | Refills: 0 | OUTPATIENT
Start: 2022-09-30

## 2022-10-02 DIAGNOSIS — B35.1 ONYCHOMYCOSIS: ICD-10-CM

## 2022-10-03 DIAGNOSIS — B35.1 ONYCHOMYCOSIS: ICD-10-CM

## 2022-10-03 RX ORDER — TERBINAFINE HYDROCHLORIDE 250 MG/1
TABLET ORAL
Qty: 30 TABLET | Refills: 0 | OUTPATIENT
Start: 2022-10-03

## 2022-10-03 NOTE — TELEPHONE ENCOUNTER
Terbinafine Hydrochloride 250 Mg Tab Nort    Medication was denied 3 days ago. Please advise what you would like us to inform patient? Thanks!

## 2022-10-03 NOTE — TELEPHONE ENCOUNTER
Received another refill request and Dr Cammy Damon was refused once more due to medication not appropriate. Patient should have only taken it 3 months in a row for a fungal infection. Left VM for patient to clarify why the refill was refused on our end.

## 2022-10-04 RX ORDER — TERBINAFINE HYDROCHLORIDE 250 MG/1
250 TABLET ORAL DAILY
Qty: 30 TABLET | Refills: 0 | OUTPATIENT
Start: 2022-10-04

## 2022-10-13 DIAGNOSIS — E11.40 TYPE 2 DIABETES MELLITUS WITH DIABETIC NEUROPATHY, WITH LONG-TERM CURRENT USE OF INSULIN (HCC): ICD-10-CM

## 2022-10-13 DIAGNOSIS — Z79.4 TYPE 2 DIABETES MELLITUS WITH DIABETIC NEUROPATHY, WITH LONG-TERM CURRENT USE OF INSULIN (HCC): ICD-10-CM

## 2022-10-13 RX ORDER — INSULIN ASPART 100 [IU]/ML
INJECTION, SOLUTION INTRAVENOUS; SUBCUTANEOUS
Qty: 60 ML | Refills: 2 | Status: SHIPPED | OUTPATIENT
Start: 2022-10-13

## 2022-10-13 NOTE — TELEPHONE ENCOUNTER
LOV: 7/18/22 for Diabetes     UPCOMING APPT: 10/31/22    LAST REFILL: 5/22/2021  QTY:  60 mL  / 1 REFILLS      RX pended, please review if applicable. Thank You!

## 2022-10-27 ENCOUNTER — LAB ENCOUNTER (OUTPATIENT)
Dept: LAB | Age: 67
End: 2022-10-27
Attending: FAMILY MEDICINE
Payer: MEDICARE

## 2022-10-27 DIAGNOSIS — E78.5 DYSLIPIDEMIA: ICD-10-CM

## 2022-10-27 DIAGNOSIS — Z87.39 HISTORY OF GOUT: ICD-10-CM

## 2022-10-27 DIAGNOSIS — Z12.5 SCREENING PSA (PROSTATE SPECIFIC ANTIGEN): ICD-10-CM

## 2022-10-27 DIAGNOSIS — E11.65 UNCONTROLLED TYPE 2 DIABETES MELLITUS WITH HYPERGLYCEMIA (HCC): ICD-10-CM

## 2022-10-27 LAB
ALBUMIN SERPL-MCNC: 3.6 G/DL (ref 3.4–5)
ALBUMIN/GLOB SERPL: 1 {RATIO} (ref 1–2)
ALP LIVER SERPL-CCNC: 99 U/L
ALT SERPL-CCNC: 34 U/L
ANION GAP SERPL CALC-SCNC: 8 MMOL/L (ref 0–18)
AST SERPL-CCNC: 18 U/L (ref 15–37)
BILIRUB SERPL-MCNC: 0.4 MG/DL (ref 0.1–2)
BUN BLD-MCNC: 27 MG/DL (ref 7–18)
BUN/CREAT SERPL: 22.3 (ref 10–20)
CALCIUM BLD-MCNC: 9.5 MG/DL (ref 8.5–10.1)
CHLORIDE SERPL-SCNC: 103 MMOL/L (ref 98–112)
CHOLEST SERPL-MCNC: 170 MG/DL (ref ?–200)
CO2 SERPL-SCNC: 24 MMOL/L (ref 21–32)
COMPLEXED PSA SERPL-MCNC: 0.19 NG/ML (ref ?–4)
CREAT BLD-MCNC: 1.21 MG/DL
CREAT UR-SCNC: 68.7 MG/DL
EST. AVERAGE GLUCOSE BLD GHB EST-MCNC: 197 MG/DL (ref 68–126)
FASTING PATIENT LIPID ANSWER: YES
FASTING STATUS PATIENT QL REPORTED: YES
GFR SERPLBLD BASED ON 1.73 SQ M-ARVRAT: 65 ML/MIN/1.73M2 (ref 60–?)
GLOBULIN PLAS-MCNC: 3.7 G/DL (ref 2.8–4.4)
GLUCOSE BLD-MCNC: 241 MG/DL (ref 70–99)
HBA1C MFR BLD: 8.5 % (ref ?–5.7)
HDLC SERPL-MCNC: 45 MG/DL (ref 40–59)
LDLC SERPL CALC-MCNC: 86 MG/DL (ref ?–100)
MICROALBUMIN UR-MCNC: 7.25 MG/DL
MICROALBUMIN/CREAT 24H UR-RTO: 105.5 UG/MG (ref ?–30)
NONHDLC SERPL-MCNC: 125 MG/DL (ref ?–130)
OSMOLALITY SERPL CALC.SUM OF ELEC: 293 MOSM/KG (ref 275–295)
POTASSIUM SERPL-SCNC: 4.8 MMOL/L (ref 3.5–5.1)
PROT SERPL-MCNC: 7.3 G/DL (ref 6.4–8.2)
SODIUM SERPL-SCNC: 135 MMOL/L (ref 136–145)
TRIGL SERPL-MCNC: 234 MG/DL (ref 30–149)
URATE SERPL-MCNC: 7.5 MG/DL
VLDLC SERPL CALC-MCNC: 38 MG/DL (ref 0–30)

## 2022-10-27 PROCEDURE — 80061 LIPID PANEL: CPT

## 2022-10-27 PROCEDURE — 84550 ASSAY OF BLOOD/URIC ACID: CPT

## 2022-10-27 PROCEDURE — 82570 ASSAY OF URINE CREATININE: CPT

## 2022-10-27 PROCEDURE — 80053 COMPREHEN METABOLIC PANEL: CPT

## 2022-10-27 PROCEDURE — 83036 HEMOGLOBIN GLYCOSYLATED A1C: CPT

## 2022-10-27 PROCEDURE — 82043 UR ALBUMIN QUANTITATIVE: CPT

## 2022-10-31 ENCOUNTER — OFFICE VISIT (OUTPATIENT)
Dept: FAMILY MEDICINE CLINIC | Facility: CLINIC | Age: 67
End: 2022-10-31
Payer: MEDICARE

## 2022-10-31 VITALS
RESPIRATION RATE: 16 BRPM | BODY MASS INDEX: 36.65 KG/M2 | OXYGEN SATURATION: 98 % | HEIGHT: 70 IN | DIASTOLIC BLOOD PRESSURE: 70 MMHG | SYSTOLIC BLOOD PRESSURE: 126 MMHG | HEART RATE: 95 BPM | WEIGHT: 256 LBS

## 2022-10-31 DIAGNOSIS — J44.9 CHRONIC OBSTRUCTIVE PULMONARY DISEASE, UNSPECIFIED COPD TYPE (HCC): ICD-10-CM

## 2022-10-31 DIAGNOSIS — K22.719 BARRETT'S ESOPHAGUS WITH DYSPLASIA: ICD-10-CM

## 2022-10-31 DIAGNOSIS — Z87.891 PERSONAL HISTORY OF NICOTINE DEPENDENCE: ICD-10-CM

## 2022-10-31 DIAGNOSIS — E66.9 OBESITY (BMI 30-39.9): ICD-10-CM

## 2022-10-31 DIAGNOSIS — Z71.85 VACCINE COUNSELING: ICD-10-CM

## 2022-10-31 DIAGNOSIS — E78.5 DYSLIPIDEMIA: ICD-10-CM

## 2022-10-31 DIAGNOSIS — E66.9 DIABETES MELLITUS TYPE 2 IN OBESE (HCC): ICD-10-CM

## 2022-10-31 DIAGNOSIS — I35.0 MILD AORTIC STENOSIS: ICD-10-CM

## 2022-10-31 DIAGNOSIS — I34.81 MITRAL VALVE ANNULAR CALCIFICATION: ICD-10-CM

## 2022-10-31 DIAGNOSIS — E11.69 DIABETES MELLITUS TYPE 2 IN OBESE (HCC): ICD-10-CM

## 2022-10-31 DIAGNOSIS — Z87.39 HISTORY OF GOUT: ICD-10-CM

## 2022-10-31 DIAGNOSIS — I10 ESSENTIAL HYPERTENSION: ICD-10-CM

## 2022-10-31 DIAGNOSIS — E55.9 VITAMIN D DEFICIENCY: ICD-10-CM

## 2022-10-31 DIAGNOSIS — I25.10 CORONARY ARTERY DISEASE INVOLVING NATIVE CORONARY ARTERY OF NATIVE HEART WITHOUT ANGINA PECTORIS: ICD-10-CM

## 2022-10-31 DIAGNOSIS — E11.65 UNCONTROLLED TYPE 2 DIABETES MELLITUS WITH HYPERGLYCEMIA (HCC): Primary | ICD-10-CM

## 2022-10-31 DIAGNOSIS — E11.40 TYPE 2 DIABETES MELLITUS WITH DIABETIC NEUROPATHY, WITH LONG-TERM CURRENT USE OF INSULIN (HCC): ICD-10-CM

## 2022-10-31 DIAGNOSIS — Z79.899 MEDICATION MANAGEMENT: ICD-10-CM

## 2022-10-31 DIAGNOSIS — Z79.4 TYPE 2 DIABETES MELLITUS WITH DIABETIC NEUROPATHY, WITH LONG-TERM CURRENT USE OF INSULIN (HCC): ICD-10-CM

## 2022-10-31 DIAGNOSIS — Z23 NEED FOR VACCINATION: ICD-10-CM

## 2022-10-31 DIAGNOSIS — F17.200 CURRENT SMOKER: ICD-10-CM

## 2022-10-31 DIAGNOSIS — I35.9 AORTIC VALVE CALCIFICATION: ICD-10-CM

## 2022-10-31 PROCEDURE — G0008 ADMIN INFLUENZA VIRUS VAC: HCPCS | Performed by: FAMILY MEDICINE

## 2022-10-31 PROCEDURE — 99214 OFFICE O/P EST MOD 30 MIN: CPT | Performed by: FAMILY MEDICINE

## 2022-10-31 PROCEDURE — 90662 IIV NO PRSV INCREASED AG IM: CPT | Performed by: FAMILY MEDICINE

## 2022-11-02 ENCOUNTER — MED REC SCAN ONLY (OUTPATIENT)
Dept: FAMILY MEDICINE CLINIC | Facility: CLINIC | Age: 67
End: 2022-11-02

## 2022-11-11 DIAGNOSIS — E78.5 DYSLIPIDEMIA: ICD-10-CM

## 2022-11-11 RX ORDER — OMEGA-3-ACID ETHYL ESTERS 1 G/1
CAPSULE, LIQUID FILLED ORAL
Qty: 360 CAPSULE | Refills: 1 | Status: SHIPPED | OUTPATIENT
Start: 2022-11-11

## 2022-11-11 RX ORDER — EZETIMIBE AND SIMVASTATIN 10; 40 MG/1; MG/1
TABLET ORAL
Qty: 45 TABLET | Refills: 1 | Status: SHIPPED | OUTPATIENT
Start: 2022-11-11

## 2022-11-21 DIAGNOSIS — E11.40 TYPE 2 DIABETES MELLITUS WITH DIABETIC NEUROPATHY, WITH LONG-TERM CURRENT USE OF INSULIN (HCC): ICD-10-CM

## 2022-11-21 DIAGNOSIS — Z79.4 TYPE 2 DIABETES MELLITUS WITH DIABETIC NEUROPATHY, WITH LONG-TERM CURRENT USE OF INSULIN (HCC): ICD-10-CM

## 2022-11-22 RX ORDER — INSULIN DETEMIR 100 [IU]/ML
INJECTION, SOLUTION SUBCUTANEOUS
Qty: 45 ML | Refills: 0 | Status: SHIPPED | OUTPATIENT
Start: 2022-11-22

## 2022-12-07 DIAGNOSIS — I10 ESSENTIAL HYPERTENSION: Primary | ICD-10-CM

## 2022-12-09 RX ORDER — HYDROCHLOROTHIAZIDE 25 MG/1
TABLET ORAL
Qty: 90 TABLET | Refills: 0 | Status: SHIPPED | OUTPATIENT
Start: 2022-12-09

## 2022-12-09 RX ORDER — OLMESARTAN MEDOXOMIL 40 MG/1
TABLET ORAL
Qty: 90 TABLET | Refills: 0 | Status: SHIPPED | OUTPATIENT
Start: 2022-12-09

## 2023-01-03 ENCOUNTER — OFFICE VISIT (OUTPATIENT)
Dept: SURGERY | Facility: CLINIC | Age: 68
End: 2023-01-03
Payer: MEDICARE

## 2023-01-03 DIAGNOSIS — R97.20 ELEVATED PSA: ICD-10-CM

## 2023-01-03 DIAGNOSIS — N31.9 HYPOTONIC NEUROGENIC BLADDER: ICD-10-CM

## 2023-01-03 DIAGNOSIS — N40.1 BPH WITH OBSTRUCTION/LOWER URINARY TRACT SYMPTOMS: Primary | ICD-10-CM

## 2023-01-03 DIAGNOSIS — N39.0 RECURRENT UTI: ICD-10-CM

## 2023-01-03 DIAGNOSIS — N13.8 BPH WITH OBSTRUCTION/LOWER URINARY TRACT SYMPTOMS: Primary | ICD-10-CM

## 2023-01-03 DIAGNOSIS — N13.30 BILATERAL HYDRONEPHROSIS: ICD-10-CM

## 2023-01-03 LAB
APPEARANCE: CLEAR
BILIRUBIN: NEGATIVE
GLUCOSE (URINE DIPSTICK): 500 MG/DL
KETONES (URINE DIPSTICK): NEGATIVE MG/DL
LEUKOCYTES: NEGATIVE
MULTISTIX LOT#: ABNORMAL NUMERIC
NITRITE, URINE: POSITIVE
OCCULT BLOOD: NEGATIVE
PH, URINE: 5.5 (ref 4.5–8)
PROTEIN (URINE DIPSTICK): NEGATIVE MG/DL
SPECIFIC GRAVITY: 1.01 (ref 1–1.03)
URINE-COLOR: YELLOW
UROBILINOGEN,SEMI-QN: 0.2 MG/DL (ref 0–1.9)

## 2023-01-03 PROCEDURE — 81003 URINALYSIS AUTO W/O SCOPE: CPT | Performed by: UROLOGY

## 2023-01-03 PROCEDURE — 51798 US URINE CAPACITY MEASURE: CPT | Performed by: UROLOGY

## 2023-01-03 PROCEDURE — 99214 OFFICE O/P EST MOD 30 MIN: CPT | Performed by: UROLOGY

## 2023-02-10 DIAGNOSIS — E11.40 TYPE 2 DIABETES MELLITUS WITH DIABETIC NEUROPATHY, WITH LONG-TERM CURRENT USE OF INSULIN (HCC): ICD-10-CM

## 2023-02-10 DIAGNOSIS — Z79.4 TYPE 2 DIABETES MELLITUS WITH DIABETIC NEUROPATHY, WITH LONG-TERM CURRENT USE OF INSULIN (HCC): ICD-10-CM

## 2023-02-13 ENCOUNTER — TELEPHONE (OUTPATIENT)
Dept: FAMILY MEDICINE CLINIC | Facility: CLINIC | Age: 68
End: 2023-02-13

## 2023-02-13 RX ORDER — INSULIN DETEMIR 100 [IU]/ML
INJECTION, SOLUTION SUBCUTANEOUS
Qty: 45 ML | Refills: 0 | Status: SHIPPED | OUTPATIENT
Start: 2023-02-13

## 2023-02-13 NOTE — TELEPHONE ENCOUNTER
Bella Escamilla from [address] [place] [address] is calling regarding the LEVEMIR FLEXTOUCH 100 UNIT/ML Subcutaneous Solution Pen-injector please call Bella Escamilla at [phone]

## 2023-02-15 ENCOUNTER — TELEPHONE (OUTPATIENT)
Dept: FAMILY MEDICINE CLINIC | Facility: CLINIC | Age: 68
End: 2023-02-15

## 2023-02-15 RX ORDER — INSULIN DETEMIR 100 [IU]/ML
INJECTION, SOLUTION SUBCUTANEOUS
Qty: 45 ML | Refills: 0 | Status: SHIPPED | OUTPATIENT
Start: 2023-02-15

## 2023-02-15 NOTE — TELEPHONE ENCOUNTER
[facility] is requesting a prescription for Levimer flex pens, the levimer flex touch is no longer available, Please call [facility] at [phone]

## 2023-02-17 ENCOUNTER — LAB ENCOUNTER (OUTPATIENT)
Dept: LAB | Age: 68
End: 2023-02-17
Attending: FAMILY MEDICINE
Payer: MEDICARE

## 2023-02-17 DIAGNOSIS — I10 HTN (HYPERTENSION): ICD-10-CM

## 2023-02-17 DIAGNOSIS — Z79.4 TYPE 2 DIABETES MELLITUS WITH DIABETIC NEUROPATHY, WITH LONG-TERM CURRENT USE OF INSULIN (HCC): ICD-10-CM

## 2023-02-17 DIAGNOSIS — E11.40 TYPE 2 DIABETES MELLITUS WITH DIABETIC NEUROPATHY, WITH LONG-TERM CURRENT USE OF INSULIN (HCC): ICD-10-CM

## 2023-02-17 DIAGNOSIS — I10 ESSENTIAL HYPERTENSION: ICD-10-CM

## 2023-02-17 DIAGNOSIS — E78.5 HYPERLIPEMIA: Primary | ICD-10-CM

## 2023-02-17 DIAGNOSIS — E78.5 DYSLIPIDEMIA: ICD-10-CM

## 2023-02-17 DIAGNOSIS — E55.9 VITAMIN D DEFICIENCY: ICD-10-CM

## 2023-02-17 DIAGNOSIS — E11.69 DIABETES MELLITUS TYPE 2 IN OBESE (HCC): ICD-10-CM

## 2023-02-17 DIAGNOSIS — Z87.39 HISTORY OF GOUT: ICD-10-CM

## 2023-02-17 DIAGNOSIS — E66.9 DIABETES MELLITUS TYPE 2 IN OBESE (HCC): ICD-10-CM

## 2023-02-17 DIAGNOSIS — J44.9 CHRONIC OBSTRUCTIVE PULMONARY DISEASE, UNSPECIFIED COPD TYPE (HCC): ICD-10-CM

## 2023-02-17 DIAGNOSIS — E11.65 UNCONTROLLED TYPE 2 DIABETES MELLITUS WITH HYPERGLYCEMIA (HCC): ICD-10-CM

## 2023-02-17 LAB
ALBUMIN SERPL-MCNC: 3.7 G/DL (ref 3.4–5)
ALBUMIN/GLOB SERPL: 1.1 {RATIO} (ref 1–2)
ALP LIVER SERPL-CCNC: 92 U/L
ALT SERPL-CCNC: 37 U/L
ANION GAP SERPL CALC-SCNC: 8 MMOL/L (ref 0–18)
AST SERPL-CCNC: 18 U/L (ref 15–37)
BASOPHILS # BLD AUTO: 0.04 X10(3) UL (ref 0–0.2)
BASOPHILS NFR BLD AUTO: 0.5 %
BILIRUB SERPL-MCNC: 0.5 MG/DL (ref 0.1–2)
BUN BLD-MCNC: 29 MG/DL (ref 7–18)
BUN/CREAT SERPL: 23.6 (ref 10–20)
CALCIUM BLD-MCNC: 9.1 MG/DL (ref 8.5–10.1)
CHLORIDE SERPL-SCNC: 105 MMOL/L (ref 98–112)
CHOLEST SERPL-MCNC: 161 MG/DL (ref ?–200)
CO2 SERPL-SCNC: 25 MMOL/L (ref 21–32)
CREAT BLD-MCNC: 1.23 MG/DL
CREAT UR-SCNC: 63.8 MG/DL
DEPRECATED RDW RBC AUTO: 43.2 FL (ref 35.1–46.3)
EOSINOPHIL # BLD AUTO: 0.22 X10(3) UL (ref 0–0.7)
EOSINOPHIL NFR BLD AUTO: 2.6 %
ERYTHROCYTE [DISTWIDTH] IN BLOOD BY AUTOMATED COUNT: 13.6 % (ref 11–15)
EST. AVERAGE GLUCOSE BLD GHB EST-MCNC: 197 MG/DL (ref 68–126)
FASTING PATIENT LIPID ANSWER: YES
FASTING STATUS PATIENT QL REPORTED: YES
GFR SERPLBLD BASED ON 1.73 SQ M-ARVRAT: 64 ML/MIN/1.73M2 (ref 60–?)
GLOBULIN PLAS-MCNC: 3.3 G/DL (ref 2.8–4.4)
GLUCOSE BLD-MCNC: 225 MG/DL (ref 70–99)
HBA1C MFR BLD: 8.5 % (ref ?–5.7)
HCT VFR BLD AUTO: 43.4 %
HDLC SERPL-MCNC: 40 MG/DL (ref 40–59)
HGB BLD-MCNC: 14.7 G/DL
IMM GRANULOCYTES # BLD AUTO: 0.06 X10(3) UL (ref 0–1)
IMM GRANULOCYTES NFR BLD: 0.7 %
LDLC SERPL CALC-MCNC: 84 MG/DL (ref ?–100)
LYMPHOCYTES # BLD AUTO: 2.51 X10(3) UL (ref 1–4)
LYMPHOCYTES NFR BLD AUTO: 29.5 %
MCH RBC QN AUTO: 29.5 PG (ref 26–34)
MCHC RBC AUTO-ENTMCNC: 33.9 G/DL (ref 31–37)
MCV RBC AUTO: 87.1 FL
MICROALBUMIN UR-MCNC: 3.51 MG/DL
MICROALBUMIN/CREAT 24H UR-RTO: 55 UG/MG (ref ?–30)
MONOCYTES # BLD AUTO: 0.4 X10(3) UL (ref 0.1–1)
MONOCYTES NFR BLD AUTO: 4.7 %
NEUTROPHILS # BLD AUTO: 5.28 X10 (3) UL (ref 1.5–7.7)
NEUTROPHILS # BLD AUTO: 5.28 X10(3) UL (ref 1.5–7.7)
NEUTROPHILS NFR BLD AUTO: 62 %
NONHDLC SERPL-MCNC: 121 MG/DL (ref ?–130)
OSMOLALITY SERPL CALC.SUM OF ELEC: 299 MOSM/KG (ref 275–295)
PLATELET # BLD AUTO: 227 10(3)UL (ref 150–450)
POTASSIUM SERPL-SCNC: 4.8 MMOL/L (ref 3.5–5.1)
PROT SERPL-MCNC: 7 G/DL (ref 6.4–8.2)
RBC # BLD AUTO: 4.98 X10(6)UL
SODIUM SERPL-SCNC: 138 MMOL/L (ref 136–145)
TRIGL SERPL-MCNC: 221 MG/DL (ref 30–149)
TSI SER-ACNC: 1.5 MIU/ML (ref 0.36–3.74)
URATE SERPL-MCNC: 7.6 MG/DL
VIT D+METAB SERPL-MCNC: 37.4 NG/ML (ref 30–100)
VLDLC SERPL CALC-MCNC: 35 MG/DL (ref 0–30)
WBC # BLD AUTO: 8.5 X10(3) UL (ref 4–11)

## 2023-02-17 PROCEDURE — 84550 ASSAY OF BLOOD/URIC ACID: CPT

## 2023-02-17 PROCEDURE — 84443 ASSAY THYROID STIM HORMONE: CPT

## 2023-02-17 PROCEDURE — 82306 VITAMIN D 25 HYDROXY: CPT

## 2023-02-17 PROCEDURE — 83036 HEMOGLOBIN GLYCOSYLATED A1C: CPT

## 2023-02-17 PROCEDURE — 85025 COMPLETE CBC W/AUTO DIFF WBC: CPT

## 2023-02-17 PROCEDURE — 82570 ASSAY OF URINE CREATININE: CPT

## 2023-02-17 PROCEDURE — 80061 LIPID PANEL: CPT

## 2023-02-17 PROCEDURE — 82043 UR ALBUMIN QUANTITATIVE: CPT

## 2023-02-17 PROCEDURE — 80053 COMPREHEN METABOLIC PANEL: CPT

## 2023-02-21 ENCOUNTER — OFFICE VISIT (OUTPATIENT)
Dept: FAMILY MEDICINE CLINIC | Facility: CLINIC | Age: 68
End: 2023-02-21
Payer: MEDICARE

## 2023-02-21 VITALS
HEIGHT: 70 IN | SYSTOLIC BLOOD PRESSURE: 122 MMHG | BODY MASS INDEX: 35.93 KG/M2 | WEIGHT: 251 LBS | HEART RATE: 91 BPM | OXYGEN SATURATION: 100 % | DIASTOLIC BLOOD PRESSURE: 74 MMHG | RESPIRATION RATE: 16 BRPM

## 2023-02-21 DIAGNOSIS — Z79.4 TYPE 2 DIABETES MELLITUS WITH DIABETIC NEUROPATHY, WITH LONG-TERM CURRENT USE OF INSULIN (HCC): Primary | ICD-10-CM

## 2023-02-21 DIAGNOSIS — Z71.85 VACCINE COUNSELING: ICD-10-CM

## 2023-02-21 DIAGNOSIS — K22.719 BARRETT'S ESOPHAGUS WITH DYSPLASIA: ICD-10-CM

## 2023-02-21 DIAGNOSIS — E66.9 OBESITY (BMI 30-39.9): ICD-10-CM

## 2023-02-21 DIAGNOSIS — E66.9 DIABETES MELLITUS TYPE 2 IN OBESE (HCC): ICD-10-CM

## 2023-02-21 DIAGNOSIS — I10 ESSENTIAL HYPERTENSION: ICD-10-CM

## 2023-02-21 DIAGNOSIS — E11.65 UNCONTROLLED TYPE 2 DIABETES MELLITUS WITH HYPERGLYCEMIA (HCC): ICD-10-CM

## 2023-02-21 DIAGNOSIS — I25.10 CORONARY ARTERY DISEASE INVOLVING NATIVE CORONARY ARTERY OF NATIVE HEART WITHOUT ANGINA PECTORIS: ICD-10-CM

## 2023-02-21 DIAGNOSIS — E55.9 VITAMIN D DEFICIENCY: ICD-10-CM

## 2023-02-21 DIAGNOSIS — Z87.891 PERSONAL HISTORY OF NICOTINE DEPENDENCE: ICD-10-CM

## 2023-02-21 DIAGNOSIS — J44.9 CHRONIC OBSTRUCTIVE PULMONARY DISEASE, UNSPECIFIED COPD TYPE (HCC): ICD-10-CM

## 2023-02-21 DIAGNOSIS — E78.5 DYSLIPIDEMIA: ICD-10-CM

## 2023-02-21 DIAGNOSIS — I34.81 MITRAL VALVE ANNULAR CALCIFICATION: ICD-10-CM

## 2023-02-21 DIAGNOSIS — Z87.39 HISTORY OF GOUT: ICD-10-CM

## 2023-02-21 DIAGNOSIS — E11.40 TYPE 2 DIABETES MELLITUS WITH DIABETIC NEUROPATHY, WITH LONG-TERM CURRENT USE OF INSULIN (HCC): Primary | ICD-10-CM

## 2023-02-21 DIAGNOSIS — E11.69 DIABETES MELLITUS TYPE 2 IN OBESE (HCC): ICD-10-CM

## 2023-02-21 DIAGNOSIS — I35.0 MILD AORTIC STENOSIS: ICD-10-CM

## 2023-02-21 DIAGNOSIS — F17.200 CURRENT SMOKER: ICD-10-CM

## 2023-02-21 DIAGNOSIS — I35.9 AORTIC VALVE CALCIFICATION: ICD-10-CM

## 2023-02-21 DIAGNOSIS — Z79.899 MEDICATION MANAGEMENT: ICD-10-CM

## 2023-02-21 PROCEDURE — 99214 OFFICE O/P EST MOD 30 MIN: CPT | Performed by: FAMILY MEDICINE

## 2023-02-21 PROCEDURE — 99406 BEHAV CHNG SMOKING 3-10 MIN: CPT | Performed by: FAMILY MEDICINE

## 2023-03-01 ENCOUNTER — TELEPHONE (OUTPATIENT)
Dept: ENDOCRINOLOGY CLINIC | Facility: CLINIC | Age: 68
End: 2023-03-01

## 2023-03-01 ENCOUNTER — OFFICE VISIT (OUTPATIENT)
Dept: ENDOCRINOLOGY CLINIC | Facility: CLINIC | Age: 68
End: 2023-03-01
Payer: MEDICARE

## 2023-03-01 VITALS
HEART RATE: 90 BPM | OXYGEN SATURATION: 98 % | WEIGHT: 249.19 LBS | BODY MASS INDEX: 36 KG/M2 | DIASTOLIC BLOOD PRESSURE: 58 MMHG | RESPIRATION RATE: 18 BRPM | SYSTOLIC BLOOD PRESSURE: 120 MMHG

## 2023-03-01 DIAGNOSIS — E11.65 TYPE 2 DIABETES MELLITUS WITH HYPERGLYCEMIA, WITH LONG-TERM CURRENT USE OF INSULIN (HCC): Primary | ICD-10-CM

## 2023-03-01 DIAGNOSIS — E11.65 TYPE 2 DIABETES MELLITUS WITH HYPERGLYCEMIA, WITH LONG-TERM CURRENT USE OF INSULIN (HCC): ICD-10-CM

## 2023-03-01 DIAGNOSIS — E66.01 MORBID (SEVERE) OBESITY DUE TO EXCESS CALORIES (HCC): ICD-10-CM

## 2023-03-01 DIAGNOSIS — I10 ESSENTIAL HYPERTENSION: ICD-10-CM

## 2023-03-01 DIAGNOSIS — R80.9 URINE PROTEIN INCREASED: ICD-10-CM

## 2023-03-01 DIAGNOSIS — F17.200 SMOKING: ICD-10-CM

## 2023-03-01 DIAGNOSIS — Z79.4 TYPE 2 DIABETES MELLITUS WITH HYPERGLYCEMIA, WITH LONG-TERM CURRENT USE OF INSULIN (HCC): ICD-10-CM

## 2023-03-01 DIAGNOSIS — Z79.4 TYPE 2 DIABETES MELLITUS WITH HYPERGLYCEMIA, WITH LONG-TERM CURRENT USE OF INSULIN (HCC): Primary | ICD-10-CM

## 2023-03-01 DIAGNOSIS — E78.5 DYSLIPIDEMIA: ICD-10-CM

## 2023-03-01 PROBLEM — E10.43 AUTONOMIC NEUROPATHY DUE TO TYPE 1 DIABETES MELLITUS (HCC): Status: RESOLVED | Noted: 2022-03-15 | Resolved: 2023-03-01

## 2023-03-01 PROBLEM — E10.9 INSULIN DEPENDENT DIABETES MELLITUS TYPE IA (HCC): Status: RESOLVED | Noted: 2020-06-05 | Resolved: 2023-03-01

## 2023-03-01 LAB
GLUCOSE BLOOD: 290
TEST STRIP LOT #: NORMAL NUMERIC

## 2023-03-01 PROCEDURE — 82947 ASSAY GLUCOSE BLOOD QUANT: CPT | Performed by: NURSE PRACTITIONER

## 2023-03-01 PROCEDURE — 99215 OFFICE O/P EST HI 40 MIN: CPT | Performed by: NURSE PRACTITIONER

## 2023-03-01 RX ORDER — BLOOD-GLUCOSE SENSOR
1 EACH MISCELLANEOUS
Qty: 3 EACH | Refills: 3 | Status: SHIPPED | OUTPATIENT
Start: 2023-03-01

## 2023-03-01 RX ORDER — BLOOD-GLUCOSE,RECEIVER,CONT
1 EACH MISCELLANEOUS ONCE
Qty: 1 EACH | Refills: 0 | Status: SHIPPED | OUTPATIENT
Start: 2023-03-01 | End: 2023-03-01

## 2023-03-07 NOTE — TELEPHONE ENCOUNTER
Contacted pharmacy, medicare needs notes. Faxed via [employer] to [employer] @ [phone],  Follow up in 2-3 days.

## 2023-03-07 NOTE — TELEPHONE ENCOUNTER
dexcom update Fluconazole Pregnancy And Lactation Text: This medication is Pregnancy Category C and it isn't know if it is safe during pregnancy. It is also excreted in breast milk.

## 2023-03-09 DIAGNOSIS — I10 ESSENTIAL HYPERTENSION: ICD-10-CM

## 2023-03-10 RX ORDER — HYDROCHLOROTHIAZIDE 25 MG/1
TABLET ORAL
Qty: 90 TABLET | Refills: 1 | Status: SHIPPED | OUTPATIENT
Start: 2023-03-10

## 2023-03-10 RX ORDER — OLMESARTAN MEDOXOMIL 40 MG/1
TABLET ORAL
Qty: 90 TABLET | Refills: 1 | Status: SHIPPED | OUTPATIENT
Start: 2023-03-10

## 2023-03-15 NOTE — TELEPHONE ENCOUNTER
Contacted [facility] medicare dexcom dept @ [phone]. Was informed they received our chart note, but it does not show sliding scale or frequent adjustment to meal time insulin.

## 2023-03-15 NOTE — TELEPHONE ENCOUNTER
addended note       Patient is on at least 3 insulin injections per day and has been for the past 6 months. Patient is making self-adjustments in insulin according to blood sugars or carbs.   Patient has had CGM education

## 2023-03-20 ENCOUNTER — PATIENT MESSAGE (OUTPATIENT)
Dept: ENDOCRINOLOGY CLINIC | Facility: CLINIC | Age: 68
End: 2023-03-20

## 2023-03-20 NOTE — TELEPHONE ENCOUNTER
From: Allyssa Baxter  To: DEACON Grajeda  Sent: 3/20/2023 11:01 AM CDT  Subject: Dexcon    Good morning, On March 1 you placed an order for a Dexcon to the [facility] at [employer]. After 2 weeks since I had not heard from them I called and was told that they needed my Medicare number. I went there and gave them my Medicare card. I stopped in after a week and was told that they are waiting for a document from you. Since they never contacted me I was wondering if they also failed to contact you. I have an appointment scheduled to learn how to use the device. If I do not receive the device by next weekend should I reschedule?

## 2023-03-20 NOTE — TELEPHONE ENCOUNTER
Contacted pharmacy, needs PA. Contacted Medicare Dexcom department and hold time was 11 minutes. Will call back later.

## 2023-03-20 NOTE — TELEPHONE ENCOUNTER
Received fax from [facility] department requesting for Sliding scale for pt Novolog faxed OV notes from 3/01/23 too [phone] confirmed

## 2023-03-24 ENCOUNTER — TELEPHONE (OUTPATIENT)
Dept: ENDOCRINOLOGY CLINIC | Facility: CLINIC | Age: 68
End: 2023-03-24

## 2023-03-24 NOTE — TELEPHONE ENCOUNTER
Received [employer] for dexcom G7 supplies. Completed and faxed back to [phone], confirmed. Send to scan, copy to brown folder. Call with Medicare dexcom dept at [facility]. Still pending, requiring CMN that they will complete. If this is not approved by Monday or Tuesday morning next week, per Rosanna Moreno lets send to [address] [place] [address]

## 2023-03-24 NOTE — TELEPHONE ENCOUNTER
Received [employer] for dexcom G7 supplies. Completed and faxed back to [phone], confirmed. Send to scan, copy to brown folder.

## 2023-03-28 RX ORDER — BLOOD-GLUCOSE SENSOR
1 EACH MISCELLANEOUS
Qty: 9 EACH | Refills: 2 | Status: SHIPPED | OUTPATIENT
Start: 2023-03-28

## 2023-03-28 NOTE — TELEPHONE ENCOUNTER
Contacted [facility], still not going through. Will send to [facility] and notify patient.

## 2023-04-12 NOTE — TELEPHONE ENCOUNTER
Received fax from [address] [place] [address] for pt Dexcom G7 sensors CB filled and signed faxed too [phone] sending to scan confirmed

## 2023-04-14 ENCOUNTER — TELEPHONE (OUTPATIENT)
Dept: ENDOCRINOLOGY CLINIC | Facility: CLINIC | Age: 68
End: 2023-04-14

## 2023-04-14 NOTE — TELEPHONE ENCOUNTER
Call from [facility] regarding CMN they faxed. Was completed, refaxed with chart note to [phone], confirmed.   Copy in brown folder

## 2023-04-17 DIAGNOSIS — E11.69 DIABETES MELLITUS TYPE 2 IN OBESE (HCC): ICD-10-CM

## 2023-04-17 DIAGNOSIS — E66.9 DIABETES MELLITUS TYPE 2 IN OBESE (HCC): ICD-10-CM

## 2023-04-21 ENCOUNTER — TELEPHONE (OUTPATIENT)
Dept: ENDOCRINOLOGY CLINIC | Facility: CLINIC | Age: 68
End: 2023-04-21

## 2023-04-25 NOTE — TELEPHONE ENCOUNTER
Spoke to [facility] and pts Dexcom shipped on 4/19/23 . Contacted pt to see if Dexcom G7 was received and to see if training is needed. No answer. Left message.

## 2023-04-28 ENCOUNTER — NURSE ONLY (OUTPATIENT)
Dept: ENDOCRINOLOGY CLINIC | Facility: CLINIC | Age: 68
End: 2023-04-28
Payer: MEDICARE

## 2023-04-28 DIAGNOSIS — E11.65 TYPE 2 DIABETES MELLITUS WITH HYPERGLYCEMIA, WITH LONG-TERM CURRENT USE OF INSULIN (HCC): Primary | ICD-10-CM

## 2023-04-28 DIAGNOSIS — Z79.4 TYPE 2 DIABETES MELLITUS WITH HYPERGLYCEMIA, WITH LONG-TERM CURRENT USE OF INSULIN (HCC): Primary | ICD-10-CM

## 2023-05-01 NOTE — PROGRESS NOTES
Steven Rodríguez  : 5/10/1955 was seen for Diabetic Education Follow up:    Date: 2023  Referring Provider: JOSE Tapia  Start time: 12:30pm End time: 1:30pm    Assessment:        HEMOGLOBIN A1c (% of total Hgb)   Date Value   2012 7.7 (H)     HGBA1C (%)   Date Value   2014 5.8 (H)     HgbA1C (%)   Date Value   2023 8.5 (H)    Pt. is accompanied by wife to visit;he wanted her to join visit because she does most of the cooking    Diagnosis: Type 2 DM    Reason for visit: Review carb-counting    Education:     Healthy Eating  - definition of carbohydrates vs protein and fat  - Impact of carb on blood sugar  - Benefits of high fiber vs refined types of carb  - General eating tips;handout provided  - Discussed number of carbs per meal-45-60   - Discussion of immediate food modifications to current diet    Dexcom G7:  1. Sensor is worn for 10 days with a 10 hr tia period  2. Test blood glucose if symptoms do not match sensor reading. 3. How to choose and rotate sensor sites/ alternative sites. 4. Provides high/low alert w/ multiple vibrate/sound options; fixed alert if BG falls below 55 mg/dL  5. /phone needs to be within 20 ft of transmitter to receive data  6. How to handle MRI/Cat scans  7. Waterproof  8. Ability to use phone as   9 . Provided w/Dexcom Care  & tech support contact information CGM       Recommendations:      1. Follow recommended meal plan:reduce carbs   2. Test BG 1-3 x daily until trained on Dexcom G7   3. Bring glucose logs to all provider visits for review. 4. Continue current medications   5. Encouraged Steven Rodríguez to call diabetes center with any questions or concerns. Follow up on 23 for Dexcom G7 training    Patient verbalized understanding and has no further questions at this time.     Vibha Mao RN,[facility]

## 2023-05-02 ENCOUNTER — NURSE ONLY (OUTPATIENT)
Dept: ENDOCRINOLOGY CLINIC | Facility: CLINIC | Age: 68
End: 2023-05-02
Payer: MEDICARE

## 2023-05-02 VITALS — WEIGHT: 251 LBS | BODY MASS INDEX: 36 KG/M2

## 2023-05-02 DIAGNOSIS — Z79.4 TYPE 2 DIABETES MELLITUS WITH HYPERGLYCEMIA, WITH LONG-TERM CURRENT USE OF INSULIN (HCC): ICD-10-CM

## 2023-05-02 DIAGNOSIS — E11.65 TYPE 2 DIABETES MELLITUS WITH HYPERGLYCEMIA, WITH LONG-TERM CURRENT USE OF INSULIN (HCC): ICD-10-CM

## 2023-05-02 PROCEDURE — 95249 CONT GLUC MNTR PT PROV EQP: CPT | Performed by: DIETITIAN, REGISTERED

## 2023-05-02 NOTE — PROGRESS NOTES
Lan Rodriguez  [id number]/ was seen for Personal Continuous Glucose Monitoring Training/Start:    Date: 2023  Referring Provider: JOSE Quesada  Start time: 1pm End time: 2pm    Sensor Type:Dexcom G7;streaming     Patient verbalized understanding of the followin. Sensor is worn for 10 days with a 10 hr tia period  2. Test blood glucose if symptoms do not match sensor reading. 3. How to choose and rotate sensor sites/ alternative sites. 4. Provides high/low alert w/ multiple vibrate/sound options; fixed alert if BG falls below 55 mg/dL  5. /phone needs to be within 20 ft of transmitter to receive data  6. How to handle MRI/Cat scans  7. Waterproof  8. Ability to use phone as   9 . Provided w/Dexcom Care  & tech support contact information     Patient demonstrated ability to insert and start the sensor successfully. Patient placed sensor: back of left arm    Sensor Settings:  High Alarm: 240 mg/dL     Low Alarm: 70 mg/dL      Current Oral Medication:  Metformin 64492 mg 1 tab twice daily    Current Insulin:  Levemir 27 units twice daily  Novolog 23 units before meals    Written materials were provided for all the content areas covered. Patient verbalized understanding and has no further questions at this time.     Plan:   Download pt.'s reports from clarity for LISAN's review  Patient is to follow up with [facility] as scheduled     Ebony Persaud RN,[facility]

## 2023-05-03 DIAGNOSIS — E11.65 UNCONTROLLED TYPE 2 DIABETES MELLITUS WITH HYPERGLYCEMIA (HCC): Primary | ICD-10-CM

## 2023-05-03 RX ORDER — INSULIN DETEMIR 100 [IU]/ML
INJECTION, SOLUTION SUBCUTANEOUS
Qty: 45 ML | Refills: 0 | Status: SHIPPED | OUTPATIENT
Start: 2023-05-03

## 2023-06-12 DIAGNOSIS — E78.5 DYSLIPIDEMIA: ICD-10-CM

## 2023-06-12 RX ORDER — EZETIMIBE AND SIMVASTATIN 10; 40 MG/1; MG/1
TABLET ORAL
Qty: 45 TABLET | Refills: 1 | Status: SHIPPED | OUTPATIENT
Start: 2023-06-12

## 2023-06-27 NOTE — PROGRESS NOTES
HPI:     Vijay Lopez is a 77year old male with a PMH of obesity, HTN, HL, DM, GERD, COPD, asthma, CAD, hemorrhoids    Following for:  1. Hypotonic neurogenic bladder with urinary retention requiring CIC  - incidentally found to have asymptomatic urinary retention with b/l hydronephrosis on imaging with > 2 liters out (conteh placed 4/20).   - s/p TURP 6/3/21  - pt had decreased CIC frequency based on post-void cath volumes with intermittent difficulty catheterizing and note to be in retention prior visit  - UDS 12/6/21 with poor sensation and impaired detrusor contractility  - Cysto 12/6/21: very mild prostate tissue regrowth and slight constriction near bladder neck but is wide open overall with no signs of significant obstruction. - on bethanechol 100 mg BID and resumed CIC    2. Severe BPH/LUTS  - on 0.8 mg flomax for years, started proscar 4/20/21  - s/p TURP 6/3/21 (~ 67 g resected, benign)    3. Recurrent UTIs  4. Elevated PSA  5. 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 PCP - Amber Cardenas  Gen Surg - [facility]  GI  [facility] 1/3/2023    Presents for check-up. Tries to void every 3-4 h. Catheterizes approximately once every 1-2 weeks. Last cath 2 days ago. Post-void caths ~  mL. He completed abx in Sep for possible UTI but wasn't having any symptoms and feels good currently. AUA SS is 6/35 with 2 n, f; 1 w, LOS. Was 18/31 prior to TURP with 5/5 w, LOS; 4/5 f; 3/5 n; 2/5 I; 1/5 u. Happy with LUTS. Incontinence: none - reported post-void dribbling prior visit    UA is negative and PVR is 137 mL - recent range  mL. Urine was cloudy in the past but now mainly light yellow. UCx 8/11/21: < 50 K Staph  UCx 9/2/22: >100 K Staph    Voided 342.9 mL prior to UDS with PVR of 425.   UDS 12/6/21: first sensation at 352 mL. He did not have strong desire even after 598 mL. Procedure stopped. Pt given permission to void and could not with with max detrusor pressure of 25 cm H2O and PVR of 650 mL. Drinks ~ 60 oz water per day and urine typically light yellow. We have discussed continuing to drink enough water to keep urine clear to pale yellow for UTI prevention. PSA History:  - 0.19 10/27/22  - 0.36 10/22/21  - 3.82 10/8/20  - 3.14 7/18/19  - 4.22 4/19/19  - 2.54 11/9/17  - 2.27 11/29/12    Cr 1.23 2/17/23    UDS 12/6/21: first sensation at 352 mL. He did not have strong desire even after 598 mL. Procedure stopped. Pt given permission to void and could not with with max detrusor pressure of 25 cm H2O and PVR of 650 mL. CT SP 9/14/21: resolution of b/l hydronephrosis, s/p TURP, stable air in contracted GB suggesting chronic fistula from GB to bowel  RBUS 4/27/21: hydronephrosis has resolved. Reporting b/l stones but I again reviewed CT and no stones noted other than possible vascular calcification on left. CT SP  4/19/21: markedly distended bladder, moderate to severe BPH with air noted in gallbladder. He has a ~ 5 mm left renal calcification but this may be vascular. No obstructing stones with hydroureter down to the bladder. RBUS 4/18/21: markedly distended bladder, mod b/l hydro with 8 mm left renal stone  CT chest 4/13/21: partially imaged b/l hydronephrosis. He would like to stop bethanechol. He will continue post-void CIC every ~ 2 weeks, stop bethanechol, continue 0.8 mg flomax, proscar, increased water intake for UTI prevention. F/u in 6 mo with PVR. Prior note: We discussed that UDS is consistent with poor bladder sensation. He has impaired bladder contractility but this is present. If he continues to have issues catheterizing we could consider cysto with TUR of bladder neck and partial TURP and discussed there is a chance he may empty a bit better. He'd prefer we avoid for now.     Prior note:    I initially saw him with > 10 y h/o worsening LUTS, needing to sit to void and imaging showing b/l hydronephrosis. Placed conteh last visit with > 2 liters out and UA showing moderate blood. UTI history: once ~ 1 y ago  Gross hematuria: none  Tobacco hx: 30 pack years, current smoker  Kidney stone hx: none    HISTORY:  Past Medical History:   Diagnosis Date    Elziondo's esophagus     Blood in urine 04/2021    Enlarged badder, heavy for week after catheter inserted    COPD (chronic obstructive pulmonary disease) ([address]     Diabetes mellitus ([address] 15-20 years ago    Disorder of prostate Enlarged for 15 to 20 years ago    Turp scheduled for [place] 3    Esophageal reflux     Frequent urination     Hemorrhoids 2.5 years ago    Dr Trenton Nuñez rubber banded 1.5 years ago    Hiatal hernia     High cholesterol     Painful urination     Sleep apnea Noticed by wife for a few years    Unspecified essential hypertension     Unspecified gastritis and gastroduodenitis without mention of hemorrhage     Weight loss 1 year due to diet and exercise      Past Surgical History:   Procedure Laterality Date    COLONOSCOPY  03/30/2007    COLONOSCOPY  2013    Dr. Cassidy Krishnan; due in 5 years    DENTAL SURGERY PROCEDURE  03/01/2011    EGD  8/2/2019    OTHER  06/03/2021    TURP    OTHER SURGICAL HISTORY  07/07/2008    Esophagogastroduodenscopy    OTHER SURGICAL HISTORY  06/2010    Surgery for ablation of Elizondo's Esophagus    OTHER SURGICAL HISTORY  01/2010    surgery for granular cell tumor    OTHER SURGICAL HISTORY  2013    EGD; Dr. Cassidy Krishnan; due in 2 years    OTHER SURGICAL HISTORY N/A 7/2/2015    Procedure: ESOPHAGOGASTRODUODENOSCOPY (EGD);   Surgeon: Jacki Jimenez MD;  Location: [facility] ENDOSCOPY      Family History   Problem Relation Age of Onset    Other (CHF[other]) Mother     Diabetes Mother         Type II    Diabetes Father         Type II    Other (Other[other]) Father     Stroke Father     Other (Amputation[other]) Brother         leg below knee    Diabetes Brother     Cancer Sister         colon    Diabetes Brother         Type II    Other (Diabetic retinopathy[other]) Brother     Cancer Sister         liver    Cancer Sister         ovarian      Social History:   Social History     Socioeconomic History    Marital status:    Tobacco Use    Smoking status: Every Day     Packs/day: 1.00     Years: 30.00     Pack years: 30.00     Types: Cigarettes    Smokeless tobacco: Never    Tobacco comments:     1.0 pack weekends, only 4 cigs QD week days   Vaping Use    Vaping Use: Never used   Substance and Sexual Activity    Alcohol use: Not Currently     Alcohol/week: 0.0 standard drinks of alcohol     Comment: 2-3 in the last 3 months    Drug use: No    Sexual activity: Yes   Other Topics Concern    Caffeine Concern Yes     Comment: 3-4 coffee daily    Exercise Yes     Comment: occ        Medications (Active prior to today's visit):  Current Outpatient Medications   Medication Sig Dispense Refill    EZETIMIBE-SIMVASTATIN 10-40 MG Oral Tab TAKE ONE TABLET BY MOUTH EVERY OTHER DAY 45 tablet 1    LEVEMIR FLEXPEN 100 UNIT/ML Subcutaneous Solution Pen-injector INJECT 25 UNITS SUBCUTANEOUSLY IN THE MORNING AND 25 UNITS AT NIGHT 45 mL 0    METFORMIN HCL 1000 MG Oral Tab TAKE ONE TABLET BY MOUTH TWICE A DAY WITH MEALS 180 tablet 0    Continuous Blood Gluc Sensor (DEXCOM G7 SENSOR) Does not apply Misc 1 each Every 10 days. 9 each 2    HYDROCHLOROTHIAZIDE 25 MG Oral Tab TAKE 1 TABLET BY MOUTH ONE TIME DAILY 90 tablet 1    OLMESARTAN MEDOXOMIL 40 MG Oral Tab TAKE 1 TABLET BY MOUTH ONE TIME DAILY 90 tablet 1    LEVEMIR FLEXTOUCH 100 UNIT/ML Subcutaneous Solution Pen-injector INJECT 25 UNITS SUBCUTANEOUSLY IN THE MORNING AND 25 UNITS AT NIGHT 45 mL 0    tamsulosin 0.4 MG Oral Cap Take 2 capsules (0.8 mg total) by mouth daily.  Take 1/2 hour following the same meal each day 90 capsule 3    OMEGA-3-ACID ETHYL ESTERS 1 g Oral Cap TAKE TWO CAPSULES BY MOUTH TWICE A  capsule 1    NOVOLOG FLEXPEN 100 UNIT/ML Subcutaneous Solution Pen-injector INJECT 20 before each meal, MUST BE BRAND NOVOLOG PER INSURANCE 60 mL 2    TERBINAFINE 250 MG Oral Tab TAKE ONE TABLET BY MOUTH DAILY 30 tablet 0    finasteride 5 MG Oral Tab Take 1 tablet (5 mg total) by mouth daily. 90 tablet 6    omeprazole 20 MG Oral Capsule Delayed Release Take 1 capsule (20 mg total) by mouth every morning before breakfast.      Fluticasone Propionate 50 MCG/ACT Nasal Suspension 2 sprays by Each Nare route daily as needed for Rhinitis. Glucose Blood (CONTOUR NEXT TEST) In Vitro Strip 1 each by Other route 4 (four) times daily with meals and nightly. 400 each 3    Tiotropium Bromide Monohydrate (SPIRIVA HANDIHALER) 18 MCG Inhalation Cap Inhale 1 capsule (18 mcg total) into the lungs daily. 90 capsule 1    Insulin Pen Needle (BD PEN NEEDLE SHORT U/F) 31G X 8 MM Does not apply Misc Please disregard previous qty on this. Pt uses up to 4x a day. Pt uses 360 a month~ 400 each 1    Insulin Pen Needle (BD PEN NEEDLE SHORT U/F) 31G X 8 MM Does not apply Misc Use with Levemir at night and Humalog 3 times daily depending on diet plan 360 each 1    Niacin  MG Oral Tab CR Take 1 tablet (500 mg total) by mouth 2 (two) times daily with meals. Multiple Vitamins-Minerals (CENTRUM SILVER OR) Take by mouth. Allergies:    Dander                      Comment:dog  Other                   UNKNOWN  Penicillins             DIARRHEA  Tetanus Toxoids         UNKNOWN      ROS:     A comprehensive 10 point review of systems was completed. Pertinent positives and negatives noted in the the HPI.     PHYSICAL EXAM:     GENERAL APPEARANCE: well, developed, well nourished, in no acute distress  NEUROLOGIC: nonfocal, alert and oriented  HEAD: normocephalic, atraumatic  EYES: sclera non-icteric  EARS: hearing intact  ORAL CAVITY: mucosa moist  NECK/THYROID: no obvious goiter or masses  LUNGS: nonlabored breathing  ABDOMEN: soft, no obvious masses or tenderness  SKIN: no obvious rashes    : as noted above     ASSESSMENT/PLAN:   Diagnoses and all orders for this visit:    BPH with obstruction/lower urinary tract symptoms  -     URINALYSIS, AUTO, W/O SCOPE    Hypotonic neurogenic bladder    Recurrent UTI    Elevated PSA    Cloudy urine      - as noted above  - Due to urinary retention patient is to cath up to 2 times daily for an indefinite period of time. Also, patient needs a coude catheter due to the inability to pass a straight catheter because of urethral anatomy. Thanks again for this consult.     Gustavo Thompson MD, [facility]  Urologist  [facility]  Office: [phone]

## 2023-07-05 ENCOUNTER — OFFICE VISIT (OUTPATIENT)
Dept: SURGERY | Facility: CLINIC | Age: 68
End: 2023-07-05

## 2023-07-05 DIAGNOSIS — R82.90 CLOUDY URINE: ICD-10-CM

## 2023-07-05 DIAGNOSIS — N39.0 RECURRENT UTI: ICD-10-CM

## 2023-07-05 DIAGNOSIS — N13.8 BPH WITH OBSTRUCTION/LOWER URINARY TRACT SYMPTOMS: Primary | ICD-10-CM

## 2023-07-05 DIAGNOSIS — N31.9 HYPOTONIC NEUROGENIC BLADDER: ICD-10-CM

## 2023-07-05 DIAGNOSIS — N40.1 BPH WITH OBSTRUCTION/LOWER URINARY TRACT SYMPTOMS: Primary | ICD-10-CM

## 2023-07-05 DIAGNOSIS — R97.20 ELEVATED PSA: ICD-10-CM

## 2023-07-05 LAB
APPEARANCE: CLEAR
BILIRUBIN: NEGATIVE
GLUCOSE (URINE DIPSTICK): NEGATIVE MG/DL
KETONES (URINE DIPSTICK): NEGATIVE MG/DL
LEUKOCYTES: NEGATIVE
MULTISTIX LOT#: NORMAL NUMERIC
NITRITE, URINE: NEGATIVE
OCCULT BLOOD: NEGATIVE
PH, URINE: 5.5 (ref 4.5–8)
PROTEIN (URINE DIPSTICK): NEGATIVE MG/DL
SPECIFIC GRAVITY: 1.01 (ref 1–1.03)
URINE-COLOR: YELLOW
UROBILINOGEN,SEMI-QN: 0.2 MG/DL (ref 0–1.9)

## 2023-07-05 PROCEDURE — 81003 URINALYSIS AUTO W/O SCOPE: CPT | Performed by: UROLOGY

## 2023-07-05 PROCEDURE — 99214 OFFICE O/P EST MOD 30 MIN: CPT | Performed by: UROLOGY

## 2023-07-05 RX ORDER — FINASTERIDE 5 MG/1
5 TABLET, FILM COATED ORAL DAILY
Qty: 90 TABLET | Refills: 6 | Status: SHIPPED | OUTPATIENT
Start: 2023-07-05

## 2023-07-05 RX ORDER — TAMSULOSIN HYDROCHLORIDE 0.4 MG/1
0.8 CAPSULE ORAL DAILY
Qty: 90 CAPSULE | Refills: 3 | Status: SHIPPED | OUTPATIENT
Start: 2023-07-05

## 2023-07-07 ENCOUNTER — LAB ENCOUNTER (OUTPATIENT)
Dept: LAB | Age: 68
End: 2023-07-07
Attending: FAMILY MEDICINE
Payer: MEDICARE

## 2023-07-07 DIAGNOSIS — Z87.39 HISTORY OF GOUT: ICD-10-CM

## 2023-07-07 DIAGNOSIS — I10 ESSENTIAL HYPERTENSION: ICD-10-CM

## 2023-07-07 DIAGNOSIS — E78.5 DYSLIPIDEMIA: ICD-10-CM

## 2023-07-07 DIAGNOSIS — E11.40 TYPE 2 DIABETES MELLITUS WITH DIABETIC NEUROPATHY, WITH LONG-TERM CURRENT USE OF INSULIN (HCC): ICD-10-CM

## 2023-07-07 DIAGNOSIS — Z79.4 TYPE 2 DIABETES MELLITUS WITH DIABETIC NEUROPATHY, WITH LONG-TERM CURRENT USE OF INSULIN (HCC): ICD-10-CM

## 2023-07-07 LAB
ALBUMIN SERPL-MCNC: 3.6 G/DL (ref 3.4–5)
ALBUMIN/GLOB SERPL: 0.9 {RATIO} (ref 1–2)
ALP LIVER SERPL-CCNC: 94 U/L
ALT SERPL-CCNC: 41 U/L
ANION GAP SERPL CALC-SCNC: 6 MMOL/L (ref 0–18)
AST SERPL-CCNC: 26 U/L (ref 15–37)
BILIRUB SERPL-MCNC: 0.4 MG/DL (ref 0.1–2)
BUN BLD-MCNC: 24 MG/DL (ref 7–18)
CALCIUM BLD-MCNC: 9.3 MG/DL (ref 8.5–10.1)
CHLORIDE SERPL-SCNC: 104 MMOL/L (ref 98–112)
CHOLEST SERPL-MCNC: 169 MG/DL (ref ?–200)
CO2 SERPL-SCNC: 27 MMOL/L (ref 21–32)
CREAT BLD-MCNC: 1.14 MG/DL
EST. AVERAGE GLUCOSE BLD GHB EST-MCNC: 148 MG/DL (ref 68–126)
FASTING PATIENT LIPID ANSWER: YES
FASTING STATUS PATIENT QL REPORTED: YES
GFR SERPLBLD BASED ON 1.73 SQ M-ARVRAT: 69 ML/MIN/1.73M2 (ref 60–?)
GLOBULIN PLAS-MCNC: 4 G/DL (ref 2.8–4.4)
GLUCOSE BLD-MCNC: 166 MG/DL (ref 70–99)
HBA1C MFR BLD: 6.8 % (ref ?–5.7)
HDLC SERPL-MCNC: 50 MG/DL (ref 40–59)
LDLC SERPL CALC-MCNC: 87 MG/DL (ref ?–100)
NONHDLC SERPL-MCNC: 119 MG/DL (ref ?–130)
OSMOLALITY SERPL CALC.SUM OF ELEC: 292 MOSM/KG (ref 275–295)
POTASSIUM SERPL-SCNC: 4.7 MMOL/L (ref 3.5–5.1)
PROT SERPL-MCNC: 7.6 G/DL (ref 6.4–8.2)
SODIUM SERPL-SCNC: 137 MMOL/L (ref 136–145)
TRIGL SERPL-MCNC: 192 MG/DL (ref 30–149)
URATE SERPL-MCNC: 9.5 MG/DL
VLDLC SERPL CALC-MCNC: 31 MG/DL (ref 0–30)

## 2023-07-07 PROCEDURE — 83036 HEMOGLOBIN GLYCOSYLATED A1C: CPT

## 2023-07-07 PROCEDURE — 80061 LIPID PANEL: CPT

## 2023-07-07 PROCEDURE — 84550 ASSAY OF BLOOD/URIC ACID: CPT

## 2023-07-07 PROCEDURE — 80053 COMPREHEN METABOLIC PANEL: CPT

## 2023-07-11 ENCOUNTER — OFFICE VISIT (OUTPATIENT)
Dept: FAMILY MEDICINE CLINIC | Facility: CLINIC | Age: 68
End: 2023-07-11
Payer: MEDICARE

## 2023-07-11 VITALS
OXYGEN SATURATION: 97 % | HEIGHT: 70 IN | HEART RATE: 87 BPM | WEIGHT: 255 LBS | SYSTOLIC BLOOD PRESSURE: 126 MMHG | BODY MASS INDEX: 36.51 KG/M2 | RESPIRATION RATE: 18 BRPM | DIASTOLIC BLOOD PRESSURE: 74 MMHG

## 2023-07-11 DIAGNOSIS — Z87.39 HISTORY OF GOUT: ICD-10-CM

## 2023-07-11 DIAGNOSIS — D17.22 BENIGN LIPOMATOUS NEOPLASM OF SKIN AND SUBCUTANEOUS TISSUE OF LEFT ARM: ICD-10-CM

## 2023-07-11 DIAGNOSIS — Z87.891 PERSONAL HISTORY OF NICOTINE DEPENDENCE: ICD-10-CM

## 2023-07-11 DIAGNOSIS — I35.9 AORTIC VALVE CALCIFICATION: ICD-10-CM

## 2023-07-11 DIAGNOSIS — E55.9 VITAMIN D DEFICIENCY: ICD-10-CM

## 2023-07-11 DIAGNOSIS — I34.81 MITRAL VALVE ANNULAR CALCIFICATION: ICD-10-CM

## 2023-07-11 DIAGNOSIS — K29.70 GASTRITIS AND GASTRODUODENITIS: ICD-10-CM

## 2023-07-11 DIAGNOSIS — Z79.4 TYPE 2 DIABETES MELLITUS WITH DIABETIC NEUROPATHY, WITH LONG-TERM CURRENT USE OF INSULIN (HCC): ICD-10-CM

## 2023-07-11 DIAGNOSIS — I35.0 MILD AORTIC STENOSIS: ICD-10-CM

## 2023-07-11 DIAGNOSIS — Z80.0 FAMILY HISTORY OF MALIGNANT NEOPLASM OF GASTROINTESTINAL TRACT: ICD-10-CM

## 2023-07-11 DIAGNOSIS — E11.69 DIABETES MELLITUS TYPE 2 IN OBESE: ICD-10-CM

## 2023-07-11 DIAGNOSIS — E66.9 OBESITY (BMI 30-39.9): ICD-10-CM

## 2023-07-11 DIAGNOSIS — I10 ESSENTIAL HYPERTENSION: ICD-10-CM

## 2023-07-11 DIAGNOSIS — K44.9 DIAPHRAGMATIC HERNIA WITHOUT OBSTRUCTION AND WITHOUT GANGRENE: ICD-10-CM

## 2023-07-11 DIAGNOSIS — Z79.899 MEDICATION MANAGEMENT: ICD-10-CM

## 2023-07-11 DIAGNOSIS — B35.1 ONYCHOMYCOSIS: ICD-10-CM

## 2023-07-11 DIAGNOSIS — I25.10 CORONARY ARTERY DISEASE INVOLVING NATIVE CORONARY ARTERY OF NATIVE HEART WITHOUT ANGINA PECTORIS: ICD-10-CM

## 2023-07-11 DIAGNOSIS — Z87.19 HISTORY OF HEMORRHOIDS: ICD-10-CM

## 2023-07-11 DIAGNOSIS — K82.3 CHOLECYSTODUODENAL FISTULA: ICD-10-CM

## 2023-07-11 DIAGNOSIS — Z00.00 ENCOUNTER FOR ANNUAL HEALTH EXAMINATION: Primary | ICD-10-CM

## 2023-07-11 DIAGNOSIS — K80.20 GALLSTONES: ICD-10-CM

## 2023-07-11 DIAGNOSIS — E78.5 DYSLIPIDEMIA: ICD-10-CM

## 2023-07-11 DIAGNOSIS — N40.1 BENIGN PROSTATIC HYPERPLASIA WITH LOWER URINARY TRACT SYMPTOMS, SYMPTOM DETAILS UNSPECIFIED: ICD-10-CM

## 2023-07-11 DIAGNOSIS — K29.90 GASTRITIS AND GASTRODUODENITIS: ICD-10-CM

## 2023-07-11 DIAGNOSIS — K57.30 DIVERTICULOSIS OF COLON: ICD-10-CM

## 2023-07-11 DIAGNOSIS — E66.9 DIABETES MELLITUS TYPE 2 IN OBESE: ICD-10-CM

## 2023-07-11 DIAGNOSIS — Z13.31 DEPRESSION SCREENING: ICD-10-CM

## 2023-07-11 DIAGNOSIS — E11.40 TYPE 2 DIABETES MELLITUS WITH DIABETIC NEUROPATHY, WITH LONG-TERM CURRENT USE OF INSULIN (HCC): ICD-10-CM

## 2023-07-11 DIAGNOSIS — J44.9 CHRONIC OBSTRUCTIVE PULMONARY DISEASE, UNSPECIFIED COPD TYPE (HCC): ICD-10-CM

## 2023-07-11 DIAGNOSIS — K22.719 BARRETT'S ESOPHAGUS WITH DYSPLASIA: ICD-10-CM

## 2023-07-11 DIAGNOSIS — Z71.85 VACCINE COUNSELING: ICD-10-CM

## 2023-07-11 DIAGNOSIS — F17.200 CURRENT SMOKER: ICD-10-CM

## 2023-07-11 DIAGNOSIS — H91.91 HEARING LOSS OF RIGHT EAR, UNSPECIFIED HEARING LOSS TYPE: ICD-10-CM

## 2023-07-11 DIAGNOSIS — Z23 NEED FOR VACCINATION: ICD-10-CM

## 2023-07-11 PROCEDURE — G0444 DEPRESSION SCREEN ANNUAL: HCPCS | Performed by: FAMILY MEDICINE

## 2023-07-11 PROCEDURE — G0439 PPPS, SUBSEQ VISIT: HCPCS | Performed by: FAMILY MEDICINE

## 2023-07-11 PROCEDURE — 99214 OFFICE O/P EST MOD 30 MIN: CPT | Performed by: FAMILY MEDICINE

## 2023-07-11 PROCEDURE — G0447 BEHAVIOR COUNSEL OBESITY 15M: HCPCS | Performed by: FAMILY MEDICINE

## 2023-07-11 PROCEDURE — 1125F AMNT PAIN NOTED PAIN PRSNT: CPT | Performed by: FAMILY MEDICINE

## 2023-07-25 ENCOUNTER — TELEPHONE (OUTPATIENT)
Dept: FAMILY MEDICINE CLINIC | Facility: CLINIC | Age: 68
End: 2023-07-25

## 2023-07-29 DIAGNOSIS — E66.9 DIABETES MELLITUS TYPE 2 IN OBESE: ICD-10-CM

## 2023-07-29 DIAGNOSIS — E11.69 DIABETES MELLITUS TYPE 2 IN OBESE: ICD-10-CM

## 2023-07-29 DIAGNOSIS — E78.5 DYSLIPIDEMIA: ICD-10-CM

## 2023-07-31 RX ORDER — OMEGA-3-ACID ETHYL ESTERS 1 G/1
CAPSULE, LIQUID FILLED ORAL
Qty: 360 CAPSULE | Refills: 1 | Status: SHIPPED | OUTPATIENT
Start: 2023-07-31

## 2023-07-31 NOTE — TELEPHONE ENCOUNTER
Last office visit: 7/11/2023   Labs last completed: 7/7/2023  Requested medication(s) are due for refill today: yes  Requested medication(s) are on the active medication list same strength, form, dose/ sig: yes  Requested medication(s) are managed by provider: yes  Patient has already received a courtsey refill: no

## 2023-07-31 NOTE — TELEPHONE ENCOUNTER
Last office visit: 7/11/2023   Protocol: pass  Requested medication(s) are due for refill today: yes  Requested medication(s) are on the active medication list same strength, form, dose/ sig: yes  Requested medication(s) are managed by provider: yes  Patient has already received a courtsey refill: no

## 2023-08-04 ENCOUNTER — NURSE ONLY (OUTPATIENT)
Dept: ENDOCRINOLOGY CLINIC | Facility: CLINIC | Age: 68
End: 2023-08-04
Payer: MEDICARE

## 2023-08-04 VITALS — WEIGHT: 257 LBS | BODY MASS INDEX: 37 KG/M2

## 2023-08-04 DIAGNOSIS — Z79.4 TYPE 2 DIABETES MELLITUS WITH HYPERGLYCEMIA, WITH LONG-TERM CURRENT USE OF INSULIN (HCC): Primary | ICD-10-CM

## 2023-08-04 DIAGNOSIS — E11.65 TYPE 2 DIABETES MELLITUS WITH HYPERGLYCEMIA, WITH LONG-TERM CURRENT USE OF INSULIN (HCC): Primary | ICD-10-CM

## 2023-08-04 PROCEDURE — G0108 DIAB MANAGE TRN  PER INDIV: HCPCS | Performed by: DIETITIAN, REGISTERED

## 2023-08-04 NOTE — PROGRESS NOTES
Levi Gaviria  [id number] was seen for Personal Continuous Glucose Monitoring Download:    Date: 8/4/2023  Referring Provider: JOSE Ellison  Start time: 9am End time: 10am    Sensor Type:Dexcom G7 -streaming w/phone &   CGM Analysis of data: dates  7/22/23-8/4/23   Sensor download: full report in media  Average glucose : 156 mg/dl     SD:46%  COEFFICIENT OF VARIATION: 29.6%    20% time above 180mg /dl   4% time above 250 mg/dl  74% time in target range:  mg/dl   2% time below target range: 70mg/dl     Food/activity diary findings:   Pt called his insurance & has a list of what meds are covered on his Medicare plan  Had some minor hypoglycemia after lunch & dinner. Will skip a meal or just eat a small  snack during the day instead of lunch. Not always sure how much he will actually eat so 23 units may be too much insulin. Only has coffee at 4-5am  Had questions about carb-counting . Reviewed carb handout; said we could calculate an ICR & CF. I suggested he see an [facility] for education. He wants to wait at this point but will schedule with them after his follow-up meeting with you on 8/15/23  Walks 6 days/per week between 8-8:30am before breakfast ; not on Sunday    Current diabetes medications:   Levemir 27 units bid- pt. started rotating sites like you suggested & feels that has made a big difference in his blood sugars  Novolog 23 units tid before meals  Metformin 1,000 mg bid    Plan: Will e-mail scan copy of Dexcom download for JOSE to review;will contact him with any changes in medication  Color copy sent to scanning  Requested pt. start marking events on his phone &  indication when he ate a meal or gave an injection or exercised;agreeable w/plan  Follow-up on 8/15/23 w/JOSE Ellison      Written materials were provided for all the content areas covered. Patient verbalized understanding and has no further questions at this time.     Valentín Zendejas RN,[facility]

## 2023-08-04 NOTE — PROGRESS NOTES
Patient to continue Metformin 1000mg po bid and Levemir 27 units bid. Decrease Novolog to 20 units tid. Patient to follow up as scheduled with Bernadette WORTHY on 8/15.     Fredi Villa APRN, BC-ADM, [facility]

## 2023-08-04 NOTE — PROGRESS NOTES
LM for pt. to continue Metformin 1,000 mg twice daily & Levemir 27 units twice daily. Decrease Novolog to 20 units 3 times daily before meals. Follow-up with JOSE Adame on 8/15. Encouraged to call with any questions.

## 2023-08-15 ENCOUNTER — OFFICE VISIT (OUTPATIENT)
Dept: ENDOCRINOLOGY CLINIC | Facility: CLINIC | Age: 68
End: 2023-08-15
Payer: MEDICARE

## 2023-08-15 VITALS
SYSTOLIC BLOOD PRESSURE: 112 MMHG | WEIGHT: 256.63 LBS | OXYGEN SATURATION: 98 % | BODY MASS INDEX: 37 KG/M2 | DIASTOLIC BLOOD PRESSURE: 60 MMHG | RESPIRATION RATE: 17 BRPM | HEART RATE: 84 BPM

## 2023-08-15 DIAGNOSIS — I10 ESSENTIAL HYPERTENSION: ICD-10-CM

## 2023-08-15 DIAGNOSIS — E66.01 MORBID (SEVERE) OBESITY DUE TO EXCESS CALORIES (HCC): ICD-10-CM

## 2023-08-15 DIAGNOSIS — E11.40 TYPE 2 DIABETES MELLITUS WITH DIABETIC NEUROPATHY, WITH LONG-TERM CURRENT USE OF INSULIN (HCC): ICD-10-CM

## 2023-08-15 DIAGNOSIS — I25.10 CORONARY ARTERY DISEASE INVOLVING NATIVE CORONARY ARTERY OF NATIVE HEART WITHOUT ANGINA PECTORIS: ICD-10-CM

## 2023-08-15 DIAGNOSIS — Z79.4 TYPE 2 DIABETES MELLITUS WITH DIABETIC NEUROPATHY, WITH LONG-TERM CURRENT USE OF INSULIN (HCC): ICD-10-CM

## 2023-08-15 DIAGNOSIS — E78.5 DYSLIPIDEMIA: Primary | ICD-10-CM

## 2023-08-15 PROCEDURE — 99215 OFFICE O/P EST HI 40 MIN: CPT | Performed by: NURSE PRACTITIONER

## 2023-08-15 PROCEDURE — 95251 CONT GLUC MNTR ANALYSIS I&R: CPT | Performed by: NURSE PRACTITIONER

## 2023-08-15 RX ORDER — SEMAGLUTIDE 0.68 MG/ML
INJECTION, SOLUTION SUBCUTANEOUS
Qty: 1 EACH | Refills: 0 | COMMUNITY
Start: 2023-08-15

## 2023-09-05 DIAGNOSIS — E11.40 TYPE 2 DIABETES MELLITUS WITH DIABETIC NEUROPATHY, WITH LONG-TERM CURRENT USE OF INSULIN (HCC): ICD-10-CM

## 2023-09-05 DIAGNOSIS — I10 ESSENTIAL HYPERTENSION: ICD-10-CM

## 2023-09-05 DIAGNOSIS — Z79.4 TYPE 2 DIABETES MELLITUS WITH DIABETIC NEUROPATHY, WITH LONG-TERM CURRENT USE OF INSULIN (HCC): ICD-10-CM

## 2023-09-05 RX ORDER — HYDROCHLOROTHIAZIDE 25 MG/1
TABLET ORAL
Qty: 90 TABLET | Refills: 0 | Status: SHIPPED | OUTPATIENT
Start: 2023-09-05

## 2023-09-05 RX ORDER — OLMESARTAN MEDOXOMIL 40 MG/1
TABLET ORAL
Qty: 90 TABLET | Refills: 0 | Status: SHIPPED | OUTPATIENT
Start: 2023-09-05

## 2023-09-05 RX ORDER — INSULIN ASPART 100 [IU]/ML
INJECTION, SOLUTION INTRAVENOUS; SUBCUTANEOUS
Qty: 60 ML | Refills: 0 | Status: SHIPPED | OUTPATIENT
Start: 2023-09-05

## 2023-09-05 NOTE — TELEPHONE ENCOUNTER
Last office visit: 7/11/2023   Protocol: 7/7/2023  Requested medication(s) are due for refill today: yes  Requested medication(s) are on the active medication list same strength, form, dose/ sig: yes  Requested medication(s) are managed by provider: yes  Patient has already received a courtsey refill: no

## 2023-09-14 ENCOUNTER — PATIENT MESSAGE (OUTPATIENT)
Dept: FAMILY MEDICINE CLINIC | Facility: CLINIC | Age: 68
End: 2023-09-14

## 2023-09-14 ENCOUNTER — TELEMEDICINE (OUTPATIENT)
Dept: ENDOCRINOLOGY CLINIC | Facility: CLINIC | Age: 68
End: 2023-09-14
Payer: MEDICARE

## 2023-09-14 DIAGNOSIS — E78.5 DYSLIPIDEMIA: ICD-10-CM

## 2023-09-14 DIAGNOSIS — Z79.4 TYPE 2 DIABETES MELLITUS WITH DIABETIC NEUROPATHY, WITH LONG-TERM CURRENT USE OF INSULIN (HCC): Primary | ICD-10-CM

## 2023-09-14 DIAGNOSIS — E11.40 TYPE 2 DIABETES MELLITUS WITH DIABETIC NEUROPATHY, WITH LONG-TERM CURRENT USE OF INSULIN (HCC): Primary | ICD-10-CM

## 2023-09-14 DIAGNOSIS — I25.10 CORONARY ARTERY DISEASE INVOLVING NATIVE CORONARY ARTERY OF NATIVE HEART WITHOUT ANGINA PECTORIS: ICD-10-CM

## 2023-09-14 DIAGNOSIS — E66.01 MORBID (SEVERE) OBESITY DUE TO EXCESS CALORIES (HCC): ICD-10-CM

## 2023-09-14 DIAGNOSIS — I10 ESSENTIAL HYPERTENSION: ICD-10-CM

## 2023-09-14 PROCEDURE — 99214 OFFICE O/P EST MOD 30 MIN: CPT | Performed by: NURSE PRACTITIONER

## 2023-09-14 PROCEDURE — 95251 CONT GLUC MNTR ANALYSIS I&R: CPT | Performed by: NURSE PRACTITIONER

## 2023-10-19 ENCOUNTER — PATIENT MESSAGE (OUTPATIENT)
Dept: ENDOCRINOLOGY CLINIC | Facility: CLINIC | Age: 68
End: 2023-10-19

## 2023-10-19 DIAGNOSIS — Z79.4 TYPE 2 DIABETES MELLITUS WITH DIABETIC NEUROPATHY, WITH LONG-TERM CURRENT USE OF INSULIN (HCC): Primary | ICD-10-CM

## 2023-10-19 DIAGNOSIS — E11.40 TYPE 2 DIABETES MELLITUS WITH DIABETIC NEUROPATHY, WITH LONG-TERM CURRENT USE OF INSULIN (HCC): Primary | ICD-10-CM

## 2023-10-19 PROCEDURE — 95251 CONT GLUC MNTR ANALYSIS I&R: CPT | Performed by: NURSE PRACTITIONER

## 2023-10-19 NOTE — TELEPHONE ENCOUNTER
Dexcom download: 10- 8 -2023 through 10-18 .-2023       Coefficient of variation: 27.1%   GMI: estimated A1C 6.6%       Average glucose: 136 mg/dl (last download 140mg/dl )      86% In target range(70-180mg/dl) ( last download: 84%)          12%High glucose targets (> 180mg/dl) :  last download: 14%)       0%Very high glucose targets:  (> 250mg/dl)  last download: 0%)       1%Low glucose targets:(less than 70mg/dl)  last download: 2%)       <1 %Very Low glucose targets: (< 54mg/dl )  last download: <1%)       Findings:   Hypoglycemia: 3% --> 2%    Time in target: 86 %  (ADA recommended target > 70%)     Continue Ozempic 0.5mg once weekly         Please decrease Toujeo to 40 units (from 46 units)   Decrease NOVOLOG to 10  units at lunch and dinner meals   Keep novolog 14 units at breakfast meal       My chart message sent today

## 2023-10-30 DIAGNOSIS — E11.69 DIABETES MELLITUS TYPE 2 IN OBESE: ICD-10-CM

## 2023-10-30 DIAGNOSIS — E66.9 DIABETES MELLITUS TYPE 2 IN OBESE: ICD-10-CM

## 2023-11-15 ENCOUNTER — TELEPHONE (OUTPATIENT)
Dept: ENDOCRINOLOGY CLINIC | Facility: CLINIC | Age: 68
End: 2023-11-15

## 2023-11-22 ENCOUNTER — TELEPHONE (OUTPATIENT)
Dept: ENDOCRINOLOGY CLINIC | Facility: CLINIC | Age: 68
End: 2023-11-22

## 2023-12-06 DIAGNOSIS — I10 ESSENTIAL HYPERTENSION: ICD-10-CM

## 2023-12-06 RX ORDER — OLMESARTAN MEDOXOMIL 40 MG/1
TABLET ORAL
Qty: 90 TABLET | Refills: 0 | Status: SHIPPED | OUTPATIENT
Start: 2023-12-06

## 2023-12-06 RX ORDER — HYDROCHLOROTHIAZIDE 25 MG/1
TABLET ORAL
Qty: 90 TABLET | Refills: 0 | Status: SHIPPED | OUTPATIENT
Start: 2023-12-06

## 2023-12-07 ENCOUNTER — LAB ENCOUNTER (OUTPATIENT)
Dept: LAB | Age: 68
End: 2023-12-07
Attending: FAMILY MEDICINE
Payer: MEDICARE

## 2023-12-07 DIAGNOSIS — E11.69 DIABETES MELLITUS TYPE 2 IN OBESE: ICD-10-CM

## 2023-12-07 DIAGNOSIS — E11.40 TYPE 2 DIABETES MELLITUS WITH DIABETIC NEUROPATHY, WITH LONG-TERM CURRENT USE OF INSULIN (HCC): ICD-10-CM

## 2023-12-07 DIAGNOSIS — E66.9 DIABETES MELLITUS TYPE 2 IN OBESE: ICD-10-CM

## 2023-12-07 DIAGNOSIS — E78.5 DYSLIPIDEMIA: ICD-10-CM

## 2023-12-07 DIAGNOSIS — Z79.4 TYPE 2 DIABETES MELLITUS WITH DIABETIC NEUROPATHY, WITH LONG-TERM CURRENT USE OF INSULIN (HCC): ICD-10-CM

## 2023-12-07 LAB
ALBUMIN SERPL-MCNC: 3.8 G/DL (ref 3.4–5)
ALBUMIN/GLOB SERPL: 1 {RATIO} (ref 1–2)
ALP LIVER SERPL-CCNC: 88 U/L
ALT SERPL-CCNC: 34 U/L
ANION GAP SERPL CALC-SCNC: 9 MMOL/L (ref 0–18)
AST SERPL-CCNC: 15 U/L (ref 15–37)
BILIRUB SERPL-MCNC: 0.6 MG/DL (ref 0.1–2)
BUN BLD-MCNC: 24 MG/DL (ref 9–23)
CALCIUM BLD-MCNC: 9 MG/DL (ref 8.5–10.1)
CHLORIDE SERPL-SCNC: 102 MMOL/L (ref 98–112)
CHOLEST SERPL-MCNC: 153 MG/DL (ref ?–200)
CO2 SERPL-SCNC: 26 MMOL/L (ref 21–32)
CREAT BLD-MCNC: 1.32 MG/DL
CREAT UR-SCNC: 59.7 MG/DL
EGFRCR SERPLBLD CKD-EPI 2021: 58 ML/MIN/1.73M2 (ref 60–?)
EST. AVERAGE GLUCOSE BLD GHB EST-MCNC: 140 MG/DL (ref 68–126)
FASTING PATIENT LIPID ANSWER: YES
FASTING STATUS PATIENT QL REPORTED: YES
GLOBULIN PLAS-MCNC: 3.9 G/DL (ref 2.8–4.4)
GLUCOSE BLD-MCNC: 172 MG/DL (ref 70–99)
HBA1C MFR BLD: 6.5 % (ref ?–5.7)
HDLC SERPL-MCNC: 44 MG/DL (ref 40–59)
LDLC SERPL CALC-MCNC: 82 MG/DL (ref ?–100)
MICROALBUMIN UR-MCNC: 4.09 MG/DL
MICROALBUMIN/CREAT 24H UR-RTO: 68.5 UG/MG (ref ?–30)
NONHDLC SERPL-MCNC: 109 MG/DL (ref ?–130)
OSMOLALITY SERPL CALC.SUM OF ELEC: 292 MOSM/KG (ref 275–295)
POTASSIUM SERPL-SCNC: 4.3 MMOL/L (ref 3.5–5.1)
PROT SERPL-MCNC: 7.7 G/DL (ref 6.4–8.2)
SODIUM SERPL-SCNC: 137 MMOL/L (ref 136–145)
TRIGL SERPL-MCNC: 155 MG/DL (ref 30–149)
VLDLC SERPL CALC-MCNC: 25 MG/DL (ref 0–30)

## 2023-12-07 PROCEDURE — 82043 UR ALBUMIN QUANTITATIVE: CPT

## 2023-12-07 PROCEDURE — 82570 ASSAY OF URINE CREATININE: CPT

## 2023-12-07 PROCEDURE — 80053 COMPREHEN METABOLIC PANEL: CPT

## 2023-12-07 PROCEDURE — 83036 HEMOGLOBIN GLYCOSYLATED A1C: CPT | Performed by: FAMILY MEDICINE

## 2023-12-07 PROCEDURE — 80061 LIPID PANEL: CPT

## 2023-12-12 ENCOUNTER — OFFICE VISIT (OUTPATIENT)
Dept: FAMILY MEDICINE CLINIC | Facility: CLINIC | Age: 68
End: 2023-12-12
Payer: MEDICARE

## 2023-12-12 VITALS
HEART RATE: 84 BPM | RESPIRATION RATE: 17 BRPM | WEIGHT: 247 LBS | SYSTOLIC BLOOD PRESSURE: 122 MMHG | HEIGHT: 70 IN | OXYGEN SATURATION: 99 % | BODY MASS INDEX: 35.36 KG/M2 | DIASTOLIC BLOOD PRESSURE: 74 MMHG

## 2023-12-12 DIAGNOSIS — I35.9 AORTIC VALVE CALCIFICATION: ICD-10-CM

## 2023-12-12 DIAGNOSIS — E11.69 DIABETES MELLITUS TYPE 2 IN OBESE  (HCC): Primary | ICD-10-CM

## 2023-12-12 DIAGNOSIS — Z79.4 TYPE 2 DIABETES MELLITUS WITH DIABETIC NEUROPATHY, WITH LONG-TERM CURRENT USE OF INSULIN (HCC): ICD-10-CM

## 2023-12-12 DIAGNOSIS — E11.40 TYPE 2 DIABETES MELLITUS WITH DIABETIC NEUROPATHY, WITH LONG-TERM CURRENT USE OF INSULIN (HCC): ICD-10-CM

## 2023-12-12 DIAGNOSIS — I25.10 CORONARY ARTERY DISEASE INVOLVING NATIVE CORONARY ARTERY OF NATIVE HEART WITHOUT ANGINA PECTORIS: ICD-10-CM

## 2023-12-12 DIAGNOSIS — K22.719 BARRETT'S ESOPHAGUS WITH DYSPLASIA: ICD-10-CM

## 2023-12-12 DIAGNOSIS — I10 ESSENTIAL HYPERTENSION: ICD-10-CM

## 2023-12-12 DIAGNOSIS — K44.9 DIAPHRAGMATIC HERNIA WITHOUT OBSTRUCTION AND WITHOUT GANGRENE: ICD-10-CM

## 2023-12-12 DIAGNOSIS — Z12.83 SKIN EXAM, SCREENING FOR CANCER: ICD-10-CM

## 2023-12-12 DIAGNOSIS — Z87.19 HISTORY OF HEMORRHOIDS: ICD-10-CM

## 2023-12-12 DIAGNOSIS — Z87.39 HISTORY OF GOUT: ICD-10-CM

## 2023-12-12 DIAGNOSIS — I34.81 MITRAL VALVE ANNULAR CALCIFICATION: ICD-10-CM

## 2023-12-12 DIAGNOSIS — Z90.79 S/P TURP (TRANSURETHRAL RESECTION OF PROSTATE): ICD-10-CM

## 2023-12-12 DIAGNOSIS — E66.9 OBESITY (BMI 30-39.9): ICD-10-CM

## 2023-12-12 DIAGNOSIS — E55.9 VITAMIN D DEFICIENCY: ICD-10-CM

## 2023-12-12 DIAGNOSIS — F17.200 CURRENT SMOKER: ICD-10-CM

## 2023-12-12 DIAGNOSIS — I35.0 MILD AORTIC STENOSIS: ICD-10-CM

## 2023-12-12 DIAGNOSIS — Z87.891 PERSONAL HISTORY OF NICOTINE DEPENDENCE: ICD-10-CM

## 2023-12-12 DIAGNOSIS — E66.9 DIABETES MELLITUS TYPE 2 IN OBESE  (HCC): Primary | ICD-10-CM

## 2023-12-12 DIAGNOSIS — Z13.0 SCREENING FOR DEFICIENCY ANEMIA: ICD-10-CM

## 2023-12-12 DIAGNOSIS — E78.5 DYSLIPIDEMIA: ICD-10-CM

## 2023-12-12 DIAGNOSIS — R35.0 FREQUENCY OF MICTURITION: ICD-10-CM

## 2023-12-12 DIAGNOSIS — J44.9 CHRONIC OBSTRUCTIVE PULMONARY DISEASE, UNSPECIFIED COPD TYPE (HCC): ICD-10-CM

## 2023-12-12 PROCEDURE — 99499 UNLISTED E&M SERVICE: CPT | Performed by: FAMILY MEDICINE

## 2023-12-12 PROCEDURE — 99214 OFFICE O/P EST MOD 30 MIN: CPT | Performed by: FAMILY MEDICINE

## 2023-12-16 NOTE — PROGRESS NOTES
No chief complaint on file. Amy Guerrier is a 77year old presenting for type 2 diabetes management. Primary care physician: Julia Dickerson DO  Most recent  A1C 6.5 ( last A1C 6.8% )   Started Ozempic 8-2023; has decreased insulin by ~ 40% since starting GLP1 rx. Due to cost concerns, he is using 2mg ozempic pen and dosing 1.8YH (36 clicks weekly)   Notices appetite is down   Recent trends higher due to holiday gatherings       Dexcom download: 12-4 -2023 through 12-18 .-2023       Coefficient of variation: 21.2%   GMI: estimated A1C 7.2%       Average glucose: 162 mg/dl (last download 136 mg/dl )      72% In target range(70-180mg/dl) ( last download: 86%)          27%High glucose targets (> 180mg/dl) :  last download: 12%)       1%Very high glucose targets:  (> 250mg/dl)  last download: 0%)       0%Low glucose targets:(less than 70mg/dl)  last download: 1%)       <1 %Very Low glucose targets: (< 54mg/dl )  last download: <1%)       Findings:   Hypoglycemia: rare .  No pattern    Time in target: 72 %  (ADA recommended target > 70%)         Diabetes History:  Type 2 DM: ~ 2008   Patient has not had hospitalizations for blood sugar issues  denies any history of pancreatitis      Previous DM therapies:  Glyburide ; change in therapy   Janumet : change in therapy   Levemir : 8-2023 : concentrated basal rx       Current DM Regimen:  Metformin 1000mg twice daily   Ozempic 0.5mg subcutaneous once wekely   Toujeo max solo star: 40 units once daily in AM   Novolog flex pen: 14 units Breakfast, 10 units dinner       HGBA1C:    Lab Results   Component Value Date    A1C 6.5 (H) 12/07/2023    A1C 6.8 (H) 07/07/2023    A1C 8.5 (H) 02/17/2023     (H) 12/07/2023     Lab Results   Component Value Date    CHOLEST 153 12/07/2023    TRIG 155 (H) 12/07/2023    HDL 44 12/07/2023    LDL 82 12/07/2023    MICROALBCREA 68.5 (H) 12/07/2023    CREATSERUM 1.32 (H) 12/07/2023    EGFRCR 58 (L) 12/07/2023    AST 15 12/07/2023 ALT 34 12/07/2023     Lab Results   Component Value Date    TSH 1.500 02/17/2023           DM Complications:  Microvascular:   Neuropathy: no  Retinopathy: no  Nephropathy: yes    Macrovascular:  PVD: no  CAD: yes Dr Gayle Canchola . Medical management (no hx CABG or stents)   Stroke/CVA: no        Modifying factors:  Medication adherence: yes   Not rotating insulin sites   Recent steroids, illness or infections ( past 3m): no     Allergies: Dander, Other, Penicillins, and Tetanus toxoids    Past Medical History:   Diagnosis Date    Elizondo's esophagus     Blood in urine 04/2021    Enlarged badder, heavy for week after catheter inserted    COPD (chronic obstructive pulmonary disease) ([facility] [address]     Diabetes mellitus ([facility] [address] 15-20 years ago    Disorder of prostate Enlarged for 15 to 20 years ago    Turp scheduled for Merlyn 3    Esophageal reflux     Frequent urination     Hemorrhoids 2.5 years ago    Dr Melina Thrasher rubber banded 1.5 years ago    Hiatal hernia     High cholesterol     Painful urination     Sleep apnea Noticed by wife for a few years    Unspecified essential hypertension     Unspecified gastritis and gastroduodenitis without mention of hemorrhage     Weight loss 1 year due to diet and exercise     Past Surgical History:   Procedure Laterality Date    COLONOSCOPY  03/30/2007    COLONOSCOPY  2013    Dr. Joan Jordan; due in 5 years    DENTAL SURGERY PROCEDURE  03/01/2011    EGD  8/2/2019    OTHER  06/03/2021    TURP    OTHER SURGICAL HISTORY  07/07/2008    Esophagogastroduodenscopy    OTHER SURGICAL HISTORY  06/2010    Surgery for ablation of Elizondo's Esophagus    OTHER SURGICAL HISTORY  01/2010    surgery for granular cell tumor    OTHER SURGICAL HISTORY  2013    EGD; Dr. Joan Jordan; due in 2 years    OTHER SURGICAL HISTORY N/A 7/2/2015    Procedure: ESOPHAGOGASTRODUODENOSCOPY (EGD);   Surgeon: Vijaya Velasco MD;  Location: [address] [place] [address] ENDOSCOPY     Social History     Socioeconomic History    Marital status:    Tobacco Use Smoking status: Every Day     Packs/day: 1.00     Years: 30.00     Additional pack years: 0.00     Total pack years: 30.00     Types: Cigarettes    Smokeless tobacco: Never    Tobacco comments:     1.0 pack weekends, only 4 cigs QD week days   Vaping Use    Vaping Use: Never used   Substance and Sexual Activity    Alcohol use: Not Currently     Alcohol/week: 0.0 standard drinks of alcohol     Comment: 2-3 in the last 3 months    Drug use: No    Sexual activity: Yes   Other Topics Concern    Caffeine Concern Yes     Comment: 3-4 coffee daily    Exercise Yes     Comment: occ     Family History   Problem Relation Age of Onset    Other (CHF[other]) Mother     Diabetes Mother         Type II    Diabetes Father         Type II    Other (Other[other]) Father     Stroke Father     Other (Amputation[other]) Brother         leg below knee    Diabetes Brother     Cancer Sister         colon    Diabetes Brother         Type II    Other (Diabetic retinopathy[other]) Brother     Cancer Sister         liver    Cancer Sister         ovarian     Current Medication List:   Current Outpatient Medications   Medication Sig Dispense Refill    OLMESARTAN MEDOXOMIL 40 MG Oral Tab TAKE 1 TABLET BY MOUTH ONE TIME DAILY 90 tablet 0    HYDROCHLOROTHIAZIDE 25 MG Oral Tab TAKE 1 TABLET BY MOUTH ONE TIME DAILY 90 tablet 0    metFORMIN HCl 1000 MG Oral Tab TAKE ONE TABLET BY MOUTH TWICE A DAY WITH MEALS 180 tablet 1    NOVOLOG FLEXPEN 100 UNIT/ML Subcutaneous Solution Pen-injector INJECT 20 units subcutaneously before each meal (Patient taking differently: INJECT 14 in the AM and 10 with other meals) 60 mL 0    semaglutide 8 MG/3ML Subcutaneous Solution Pen-injector As directed (Patient taking differently: 0.5 mg once a week.  As directed) 1 each 12    Insulin Glargine, 2 Unit Dial, (TOUJEO MAX SOLOSTAR) 300 UNIT/ML Subcutaneous Solution Pen-injector Use up to 65 units daily as directed (Patient taking differently: Inject 40 Units into the muscle every morning. Use up to 65 units daily as directed) 12 mL 1    OMEGA-3-ACID ETHYL ESTERS 1 g Oral Cap TAKE TWO CAPSULES BY MOUTH TWICE A  capsule 1    finasteride 5 MG Oral Tab Take 1 tablet (5 mg total) by mouth daily. 90 tablet 6    tamsulosin 0.4 MG Oral Cap Take 2 capsules (0.8 mg total) by mouth daily. Take 1/2 hour following the same meal each day 90 capsule 3    EZETIMIBE-SIMVASTATIN 10-40 MG Oral Tab TAKE ONE TABLET BY MOUTH EVERY OTHER DAY 45 tablet 1    Continuous Blood Gluc Sensor (DEXCOM G7 SENSOR) Does not apply Misc 1 each Every 10 days. 9 each 2    omeprazole 20 MG Oral Capsule Delayed Release Take 1 capsule (20 mg total) by mouth every morning before breakfast.      Fluticasone Propionate 50 MCG/ACT Nasal Suspension 2 sprays by Each Nare route daily as needed for Rhinitis. Glucose Blood (CONTOUR NEXT TEST) In Vitro Strip 1 each by Other route 4 (four) times daily with meals and nightly. 400 each 3    Tiotropium Bromide Monohydrate (SPIRIVA HANDIHALER) 18 MCG Inhalation Cap Inhale 1 capsule (18 mcg total) into the lungs daily. 90 capsule 1    Insulin Pen Needle (BD PEN NEEDLE SHORT U/F) 31G X 8 MM Does not apply Misc Please disregard previous qty on this. Pt uses up to 4x a day. Pt uses 360 a month~ 400 each 1    Niacin  MG Oral Tab CR Take 1 tablet (500 mg total) by mouth 2 (two) times daily with meals. Multiple Vitamins-Minerals (CENTRUM SILVER OR) Take by mouth. DM associated review of  symptoms:   Endocrine: Polyuria, polyphagia, polydipsia: no  Neurological: Paresthesias: yes   HEENT: Blurred vision: no  Skin: no rash or wounds  Hematological: Hypoglycemia: no      Review of Systems     LUNGS: denies shortness of breath   CARDIOVASCULAR: denies chest pain  GI: denies abdominal pain, nausea or diarrhea   : denies dysuria      Physical exam:  There were no vitals taken for this visit. There is no height or weight on file to calculate BMI.     Physical Exam Constitutional: Normal appearance   Cardiovascular: Normal rate   Pulmonary/Chest: Effort normal  Neurological: Alert and oriented . Psychiatric: Normal mood and affect. Assessment/Plan:    Hypertension  Well controlled but needs ongoing monitoring   CPM       Dyslipidemia:   Last  labs done    Continue vytorin 10-40mg  3 x weekly per pt   Trigs have improved since 7-2023       Obesity   Weight 246 --> 256  lb   Continue glp1 rx - see DM     CAD   Per ADA standards of care, guidelines, in patients with type 2 diabetes and established ASCVD or indicators of high CV risk dsease,  incorporating agents proven to reduce major adverse CV events and CV mortality is indicated  Continue glp1 rx - see DM        Type 2 diabetes mellitus with hyperglycemia, with long-term current use of insulin ([facility])  A1C: 6.5 % ( last A1C 6.8%)   Weight: 246 lb ( last weight: 256  lb )     Diabetes control is improving but needs ongoing management and evaluation  given the chronicity of Diabetes, ongoing medication monitoring and risk for complications     Continue   Dexcom G7 ([facility] )   Metformin 1000mg twice daily    Toujeo max 40  units once daily   Novolog flex pen: 14  units   w breakfast, decrease dinner to 10 units   If not eating meal, ok to hold NOVOLOG   Ozempic 0.5mg subcutaneous once weekly (dosing on Ozempic 2.0mg pen:   Reminded to store ozempic pen  refrigerated for all doses     Reviewed  clinical significance of A1c, adverse effects of suboptimal glucose control, and goals of therapy   Reviewed the A1C test, what the value reflects and the goal for the patient.    Reminded pt on A1C and blood sugar targets (Fasting < 130 and post prandial <605 ) and complications associated with hyperglycemia and uncontrolled DM (on AVS)   Recommended SMBG 2- x daily if not using CGM   Reviewed s/s and treatment of hypoglycemia (on AVS)   Cost barrier w Glucagon - pt has low alert on Dexcom at 75 mg/dl Continue with lifestyle modifications since they have positive impact on diabetes/blood sugars/health (portion control, physical activity, weight loss)   Reinforced timing and adherence with medication, self-monitoring of blood glucose and routine follow up  Dexcom check in 4 weeks   In office appt donato quigley   but recommended to contact DM clinic sooner if questions or concerns. The patient indicates understanding of these issues and agrees to the plan. No orders of the defined types were placed in this encounter. DM quality  A1C/Blood pressure: as reported above   Nephropathy screenin  continue ace /arb rx. LIPID screenin  . +  statin rx. Last dilated eye exam: Last Dilated Eye Exam: 23   Exam shows retinopathy?  Eye Exam shows Diabetic Retinopathy?: No  Last diabetic foot exam: Last Foot Exam: 23

## 2023-12-18 ENCOUNTER — OFFICE VISIT (OUTPATIENT)
Dept: ENDOCRINOLOGY CLINIC | Facility: CLINIC | Age: 68
End: 2023-12-18
Payer: MEDICARE

## 2023-12-18 VITALS
BODY MASS INDEX: 35 KG/M2 | DIASTOLIC BLOOD PRESSURE: 78 MMHG | WEIGHT: 246.81 LBS | OXYGEN SATURATION: 97 % | HEART RATE: 92 BPM | SYSTOLIC BLOOD PRESSURE: 137 MMHG

## 2023-12-18 DIAGNOSIS — Z79.4 TYPE 2 DIABETES MELLITUS WITH DIABETIC NEUROPATHY, WITH LONG-TERM CURRENT USE OF INSULIN (HCC): Primary | ICD-10-CM

## 2023-12-18 DIAGNOSIS — E78.5 DYSLIPIDEMIA: ICD-10-CM

## 2023-12-18 DIAGNOSIS — E66.01 MORBID (SEVERE) OBESITY DUE TO EXCESS CALORIES (HCC): ICD-10-CM

## 2023-12-18 DIAGNOSIS — I10 ESSENTIAL HYPERTENSION: ICD-10-CM

## 2023-12-18 DIAGNOSIS — I25.10 CORONARY ARTERY DISEASE INVOLVING NATIVE CORONARY ARTERY OF NATIVE HEART WITHOUT ANGINA PECTORIS: ICD-10-CM

## 2023-12-18 DIAGNOSIS — E11.40 TYPE 2 DIABETES MELLITUS WITH DIABETIC NEUROPATHY, WITH LONG-TERM CURRENT USE OF INSULIN (HCC): Primary | ICD-10-CM

## 2023-12-18 PROCEDURE — 99214 OFFICE O/P EST MOD 30 MIN: CPT | Performed by: NURSE PRACTITIONER

## 2023-12-18 PROCEDURE — 95251 CONT GLUC MNTR ANALYSIS I&R: CPT | Performed by: NURSE PRACTITIONER

## 2023-12-21 NOTE — PROGRESS NOTES
HPI:     Eric Rodriguez is a 68 year old male with a PMH of obesity, HTN, HL, DM, GERD, COPD, asthma, CAD, hemorrhoids    Following for:  1. Hypotonic neurogenic bladder with urinary retention requiring CIC  - incidentally found to have asymptomatic urinary retention with b/l hydronephrosis on imaging with > 2 liters out (conteh placed 4/20).   - s/p TURP 6/3/21  - pt had decreased CIC frequency based on post-void cath volumes with intermittent difficulty catheterizing and note to be in retention prior visit  - UDS 12/6/21 with poor sensation and impaired detrusor contractility  - Cysto 12/6/21: very mild prostate tissue regrowth and slight constriction near bladder neck but is wide open overall with no signs of significant obstruction.  - on bethanechol 100 mg BID 12/29/21 but stopped 7/5/23 and resumed CIC but has since decreased to once every 2 weeks    2. Severe BPH/LUTS  - on 0.8 mg flomax for years, started proscar 4/20/21  - s/p TURP 6/3/21 (~ 67 g resected, benign)    3. Recurrent UTIs  4. Elevated PSA  5. 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    [facility] - Katie  [facility]    Presents for check-up.  He feels well. No interim UTIs. He is taking 0.8 mg flomax and proscar. He stopped bethanechol.  Tries to void every 3-4 h and occasionally more frequent. Catheterizes approximately once every 2 weeks and gets ~ 100 mL out. Last cath 4 days ago.     AUA SS is 5/35 with 2 n; 1 f, w, LOS. Was 20/35 prior to TURP with 5/5 w, LOS; 4/5 f; 3/5 n; 2/5 I; 1/5 u. Happy with LUTS.  Incontinence: none - reported post-void dribbling prior visit    UA is + for nitrates and PVR is 167 mL - recent range  mL for the past several checks. Will check UCx but not treat unless he develops UTI  symptoms.    UCx 8/11/21: < 50 K Staph  UCx 9/2/22: >100 K Staph    Drinks ~ 60 oz water per day and urine  was cloudy in the past but now typically light yellow. We again discussed continuing to drink enough water to keep urine clear to pale yellow for UTI prevention.  Dr Wilson strongly recommended he increase water intake d/t mild renal insufficiency on recent labs.    Voided 342.9 mL prior to UDS with PVR of 425.  UDS 12/6/21: first sensation at 352 mL. He did not have strong desire even after 598 mL. Procedure stopped. Pt given permission to void and could not with with max detrusor pressure of 25 cm H2O and PVR of 650 mL.    PSA History:  - 0.19 10/27/22  - 0.36 10/22/21  - 3.82 10/8/20  - 3.14 7/18/19  - 4.22 4/19/19  - 2.54 11/9/17  - 2.27 11/29/12    Cr 1.23 2/17/23    UDS 12/6/21: first sensation at 352 mL. He did not have strong desire even after 598 mL. Procedure stopped. Pt given permission to void and could not with with max detrusor pressure of 25 cm H2O and PVR of 650 mL.    CT SP 9/14/21: resolution of b/l hydronephrosis, s/p TURP, stable air in contracted GB suggesting chronic fistula from GB to bowel  RBUS 4/27/21: hydronephrosis has resolved. Reporting b/l stones but I again reviewed CT and no stones noted other than possible vascular calcification on left.  CT SP  4/19/21: markedly distended bladder, moderate to severe BPH with air noted in gallbladder. He has a ~ 5 mm left renal calcification but this may be vascular. No obstructing stones with hydroureter down to the bladder.  RBUS 4/18/21: markedly distended bladder, mod b/l hydro with 8 mm left renal stone  CT chest 4/13/21: partially imaged b/l hydronephrosis.    Discussed option to decrease flomax to 0.4 mg if he'd like and doesn't mind continuing CIC for now but may want to eventually stop.  He will continue post-void CIC every ~ 2 weeks, continue proscar and decrease flomax to 0.4 mg. Continue annual PSA checks for h/o elevated PSA. F/u  in 6 mo with PVR.    Prior note:  We discussed that UDS is consistent with poor bladder sensation. He has impaired bladder contractility but this is present. If he continues to have issues catheterizing we could consider cysto with TUR of bladder neck and partial TURP and discussed there is a chance he may empty a bit better. He'd prefer we avoid for now.    Prior note:    I initially saw him with > 10 y h/o worsening LUTS, needing to sit to void and imaging showing b/l hydronephrosis. Placed conteh last visit with > 2 liters out and UA showing moderate blood.    UTI history: once ~ 1 y ago  Gross hematuria: none  Tobacco hx: 30 pack years, current smoker  Kidney stone hx: none    HISTORY:  Past Medical History:   Diagnosis Date    Elizondo's esophagus     Blood in urine 04/2021    Enlarged badder, heavy for week after catheter inserted    COPD (chronic obstructive pulmonary disease) (HCC)     Diabetes mellitus ([facility]) 15-20 years ago    Disorder of prostate Enlarged for 15 to 20 years ago    Turp scheduled for Merlyn 3    Esophageal reflux     Frequent urination     Hemorrhoids 2.5 years ago    Dr Nieves rubber banded 1.5 years ago    Hiatal hernia     High cholesterol     Painful urination     Sleep apnea Noticed by wife for a few years    Unspecified essential hypertension     Unspecified gastritis and gastroduodenitis without mention of hemorrhage     Weight loss 1 year due to diet and exercise      Past Surgical History:   Procedure Laterality Date    COLONOSCOPY  03/30/2007    COLONOSCOPY  2013    Dr. Gil; due in 5 years    DENTAL SURGERY PROCEDURE  03/01/2011    EGD  8/2/2019    OTHER  06/03/2021    TURP    OTHER SURGICAL HISTORY  07/07/2008    Esophagogastroduodenscopy    OTHER SURGICAL HISTORY  06/2010    Surgery for ablation of Elizondo's Esophagus    OTHER SURGICAL HISTORY  01/2010    surgery for granular cell tumor    OTHER SURGICAL HISTORY  2013    EGD; Dr. iGl; due in 2 years    OTHER SURGICAL HISTORY  N/A 7/2/2015    Procedure: ESOPHAGOGASTRODUODENOSCOPY (EGD);  Surgeon: Crescencio Gil MD;  Location:  ENDOSCOPY      Family History   Problem Relation Age of Onset    Other (CHF[other]) Mother     Diabetes Mother         Type II    Diabetes Father         Type II    Other (Other[other]) Father     Stroke Father     Other (Amputation[other]) Brother         leg below knee    Diabetes Brother     Cancer Sister         colon    Diabetes Brother         Type II    Other (Diabetic retinopathy[other]) Brother     Cancer Sister         liver    Cancer Sister         ovarian      Social History:   Social History     Socioeconomic History    Marital status:    Tobacco Use    Smoking status: Every Day     Packs/day: 1.00     Years: 30.00     Additional pack years: 0.00     Total pack years: 30.00     Types: Cigarettes    Smokeless tobacco: Never    Tobacco comments:     1.0 pack weekends, only 4 cigs QD week days   Vaping Use    Vaping Use: Never used   Substance and Sexual Activity    Alcohol use: Not Currently     Alcohol/week: 0.0 standard drinks of alcohol     Comment: 2-3 in the last 3 months    Drug use: No    Sexual activity: Yes   Other Topics Concern    Caffeine Concern Yes     Comment: 3-4 coffee daily    Exercise Yes     Comment: occ        Medications (Active prior to today's visit):  Current Outpatient Medications   Medication Sig Dispense Refill    finasteride 5 MG Oral Tab Take 1 tablet (5 mg total) by mouth daily. 90 tablet 6    tamsulosin 0.4 MG Oral Cap Take 1 capsule (0.4 mg total) by mouth daily. Take 1/2 hour following the same meal each day 90 capsule 5    semaglutide 8 MG/3ML Subcutaneous Solution Pen-injector Inject 2 mg into the skin once a week. 9 mL 1    OLMESARTAN MEDOXOMIL 40 MG Oral Tab TAKE 1 TABLET BY MOUTH ONE TIME DAILY 90 tablet 0    HYDROCHLOROTHIAZIDE 25 MG Oral Tab TAKE 1 TABLET BY MOUTH ONE TIME DAILY 90 tablet 0    metFORMIN HCl 1000 MG Oral Tab TAKE ONE TABLET BY MOUTH TWICE  A DAY WITH MEALS 180 tablet 1    NOVOLOG FLEXPEN 100 UNIT/ML Subcutaneous Solution Pen-injector INJECT 20 units subcutaneously before each meal (Patient taking differently: INJECT 14 in the AM and 10 with other meals) 60 mL 0    Insulin Glargine, 2 Unit Dial, (TOUJEO MAX SOLOSTAR) 300 UNIT/ML Subcutaneous Solution Pen-injector Use up to 65 units daily as directed (Patient taking differently: Inject 40 Units into the muscle every morning. Use up to 65 units daily as directed) 12 mL 1    OMEGA-3-ACID ETHYL ESTERS 1 g Oral Cap TAKE TWO CAPSULES BY MOUTH TWICE A  capsule 1    EZETIMIBE-SIMVASTATIN 10-40 MG Oral Tab TAKE ONE TABLET BY MOUTH EVERY OTHER DAY 45 tablet 1    Continuous Blood Gluc Sensor (DEXCOM G7 SENSOR) Does not apply Misc 1 each Every 10 days. 9 each 2    omeprazole 20 MG Oral Capsule Delayed Release Take 1 capsule (20 mg total) by mouth every morning before breakfast.      Fluticasone Propionate 50 MCG/ACT Nasal Suspension 2 sprays by Each Nare route daily as needed for Rhinitis.      Glucose Blood (CONTOUR NEXT TEST) In Vitro Strip 1 each by Other route 4 (four) times daily with meals and nightly. 400 each 3    Tiotropium Bromide Monohydrate (SPIRIVA HANDIHALER) 18 MCG Inhalation Cap Inhale 1 capsule (18 mcg total) into the lungs daily. 90 capsule 1    Insulin Pen Needle (BD PEN NEEDLE SHORT U/F) 31G X 8 MM Does not apply Misc Please disregard previous qty on this. Pt uses up to 4x a day. Pt uses 360 a month~ 400 each 1    Niacin  MG Oral Tab CR Take 1 tablet (500 mg total) by mouth 2 (two) times daily with meals.      Multiple Vitamins-Minerals (CENTRUM SILVER OR) Take by mouth.           Allergies:  Allergies   Allergen Reactions    Dander      dog    Other UNKNOWN    Penicillins DIARRHEA    Tetanus Toxoids UNKNOWN         ROS:     A comprehensive 10 point review of systems was completed.  Pertinent positives and negatives noted in the the HPI.    PHYSICAL EXAM:     GENERAL APPEARANCE:  well, developed, well nourished, in no acute distress  NEUROLOGIC: nonfocal, alert and oriented  HEAD: normocephalic, atraumatic  EYES: sclera non-icteric  EARS: hearing intact  ORAL CAVITY: mucosa moist  NECK/THYROID: no obvious goiter or masses  LUNGS: nonlabored breathing  ABDOMEN: soft, no obvious masses or tenderness  SKIN: no obvious rashes    : as noted above     ASSESSMENT/PLAN:   Diagnoses and all orders for this visit:    BPH with obstruction/lower urinary tract symptoms  -     URINALYSIS, AUTO, W/O SCOPE  -     finasteride 5 MG Oral Tab; Take 1 tablet (5 mg total) by mouth daily.  -     tamsulosin 0.4 MG Oral Cap; Take 1 capsule (0.4 mg total) by mouth daily. Take 1/2 hour following the same meal each day    Hypotonic neurogenic bladder    Recurrent UTI    Elevated PSA    - as noted above  - Due to urinary retention patient is to cath up to 2 times daily for an indefinite period of time. Also, patient needs a coude catheter due to the inability to pass a straight catheter because of urethral anatomy.    Thanks again for this consult.    Cesar Hutchinson MD, FACS  Urologist  [facility]  Office: [phone]

## 2024-01-03 ENCOUNTER — OFFICE VISIT (OUTPATIENT)
Dept: SURGERY | Facility: CLINIC | Age: 69
End: 2024-01-03
Payer: MEDICARE

## 2024-01-03 DIAGNOSIS — N39.0 RECURRENT UTI: ICD-10-CM

## 2024-01-03 DIAGNOSIS — N31.9 HYPOTONIC NEUROGENIC BLADDER: ICD-10-CM

## 2024-01-03 DIAGNOSIS — R97.20 ELEVATED PSA: ICD-10-CM

## 2024-01-03 DIAGNOSIS — N40.1 BPH WITH OBSTRUCTION/LOWER URINARY TRACT SYMPTOMS: Primary | ICD-10-CM

## 2024-01-03 DIAGNOSIS — N13.8 BPH WITH OBSTRUCTION/LOWER URINARY TRACT SYMPTOMS: Primary | ICD-10-CM

## 2024-01-03 LAB
APPEARANCE: CLEAR
BILIRUBIN: NEGATIVE
GLUCOSE (URINE DIPSTICK): NEGATIVE MG/DL
KETONES (URINE DIPSTICK): NEGATIVE MG/DL
LEUKOCYTES: NEGATIVE
MULTISTIX LOT#: ABNORMAL NUMERIC
NITRITE, URINE: POSITIVE
OCCULT BLOOD: NEGATIVE
PH, URINE: 5.5 (ref 4.5–8)
PROTEIN (URINE DIPSTICK): NEGATIVE MG/DL
SPECIFIC GRAVITY: 1.01 (ref 1–1.03)
URINE-COLOR: YELLOW
UROBILINOGEN,SEMI-QN: 0.2 MG/DL (ref 0–1.9)

## 2024-01-03 PROCEDURE — 51798 US URINE CAPACITY MEASURE: CPT | Performed by: UROLOGY

## 2024-01-03 PROCEDURE — 81003 URINALYSIS AUTO W/O SCOPE: CPT | Performed by: UROLOGY

## 2024-01-03 PROCEDURE — 99214 OFFICE O/P EST MOD 30 MIN: CPT | Performed by: UROLOGY

## 2024-01-03 RX ORDER — FINASTERIDE 5 MG/1
5 TABLET, FILM COATED ORAL DAILY
Qty: 90 TABLET | Refills: 6 | Status: SHIPPED | OUTPATIENT
Start: 2024-01-03

## 2024-01-03 RX ORDER — TAMSULOSIN HYDROCHLORIDE 0.4 MG/1
0.4 CAPSULE ORAL DAILY
Qty: 90 CAPSULE | Refills: 5 | Status: SHIPPED | OUTPATIENT
Start: 2024-01-03

## 2024-01-15 ENCOUNTER — HOSPITAL ENCOUNTER (OUTPATIENT)
Dept: CT IMAGING | Age: 69
Discharge: HOME | End: 2024-01-15
Attending: FAMILY MEDICINE
Payer: MEDICARE

## 2024-01-15 DIAGNOSIS — Z87.891 PERSONAL HISTORY OF NICOTINE DEPENDENCE: ICD-10-CM

## 2024-01-15 DIAGNOSIS — I25.10 CORONARY ARTERY DISEASE INVOLVING NATIVE CORONARY ARTERY OF NATIVE HEART WITHOUT ANGINA PECTORIS: ICD-10-CM

## 2024-01-15 DIAGNOSIS — F17.200 CURRENT SMOKER: ICD-10-CM

## 2024-01-15 DIAGNOSIS — J44.9 CHRONIC OBSTRUCTIVE PULMONARY DISEASE, UNSPECIFIED COPD TYPE (HCC): ICD-10-CM

## 2024-01-15 PROCEDURE — 71250 CT THORAX DX C-: CPT | Performed by: FAMILY MEDICINE

## 2024-01-29 RX ORDER — INSULIN GLARGINE 300 U/ML
INJECTION, SOLUTION SUBCUTANEOUS
Qty: 12 ML | Refills: 0 | Status: SHIPPED | OUTPATIENT
Start: 2024-01-29

## 2024-01-29 NOTE — TELEPHONE ENCOUNTER
Requested Prescriptions     Pending Prescriptions Disp Refills    TOUJEO MAX SOLOSTAR 300 UNIT/ML Subcutaneous Solution Pen-injector [Pharmacy Med Name: Toujeo Max Solostar 300 Unit/Ml Inj Abigail] 12 mL 0     Sig: Use up to 65 units daily as directed     Your appointments       Date & Time Appointment Department ([place])    Mar 25, 2024  9:00 AM CDT Established Patient with Dain Lew MD [facility] ([facility])        Apr 29, 2024  9:30 AM CDT Follow Up Visit with Jo Ann Wilson DO [facility] ([facility])    Contact your primary care provider if your insurance requires a referral.    Please arrive 15 minutes prior to your scheduled appointment. Be sure to bring your current Insurance card, Photo ID, and medication bottles or a list of your current medications.      A 24 hour notice is required to cancel any appointment or you may be charged a $40 No Show Fee.     Important: 24 hour notice is required to cancel any appointment or you may be charged a $40 No Show Fee. Please notify your physician office.         Jul 08, 2024  9:15 AM CDT Follow Up Visit with Cesar Hutchinson MD [facility] ([place] [address])              [facility]  [address]  [facility] [id number] [phone] [facility] [address]  [address]  [facility] [id number] [phone] [facility]  [facility]  [address]  [facility] [address]  [phone]          Last A1c value was 6.5% done 12/7/2023.    Refill 8/09/23  LOV 12/18/23 - no FU

## 2024-02-14 DIAGNOSIS — E78.5 DYSLIPIDEMIA: ICD-10-CM

## 2024-02-14 RX ORDER — EZETIMIBE AND SIMVASTATIN 10; 40 MG/1; MG/1
TABLET ORAL
Qty: 45 TABLET | Refills: 1 | Status: SHIPPED | OUTPATIENT
Start: 2024-02-14

## 2024-02-14 RX ORDER — OMEGA-3-ACID ETHYL ESTERS 1 G/1
CAPSULE, LIQUID FILLED ORAL
Qty: 360 CAPSULE | Refills: 1 | Status: SHIPPED | OUTPATIENT
Start: 2024-02-14

## 2024-02-14 NOTE — TELEPHONE ENCOUNTER
Last office visit: 12/12/2023   Protocol: pass  Requested medication(s) are due for refill today: yes  Requested medication(s) are on the active medication list same strength, form, dose/ sig: yes  Requested medication(s) are managed by provider: yes  Patient has already received a courtsey refill: no     NOV: 4/29/2024

## 2024-03-06 DIAGNOSIS — I10 ESSENTIAL HYPERTENSION: ICD-10-CM

## 2024-03-06 RX ORDER — HYDROCHLOROTHIAZIDE 25 MG/1
TABLET ORAL
Qty: 90 TABLET | Refills: 0 | Status: SHIPPED | OUTPATIENT
Start: 2024-03-06

## 2024-03-06 RX ORDER — OLMESARTAN MEDOXOMIL 40 MG/1
TABLET ORAL
Qty: 90 TABLET | Refills: 0 | Status: SHIPPED | OUTPATIENT
Start: 2024-03-06

## 2024-03-19 DIAGNOSIS — Z79.4 TYPE 2 DIABETES MELLITUS WITH DIABETIC NEUROPATHY, WITH LONG-TERM CURRENT USE OF INSULIN (HCC): ICD-10-CM

## 2024-03-19 DIAGNOSIS — E11.40 TYPE 2 DIABETES MELLITUS WITH DIABETIC NEUROPATHY, WITH LONG-TERM CURRENT USE OF INSULIN (HCC): ICD-10-CM

## 2024-03-19 RX ORDER — INSULIN ASPART 100 [IU]/ML
INJECTION, SOLUTION INTRAVENOUS; SUBCUTANEOUS
Qty: 60 ML | Refills: 0 | Status: SHIPPED | OUTPATIENT
Start: 2024-03-19

## 2024-04-08 NOTE — TELEPHONE ENCOUNTER
Requested Prescriptions     Pending Prescriptions Disp Refills    TOUJEO MAX SOLOSTAR 300 UNIT/ML Subcutaneous Solution Pen-injector [Pharmacy Med Name: Toujeo Max Solostar 300 Unit/Ml Inj Abigail] 12 mL 0     Sig: Use up to 65 units daily as directed     Your appointments       Date & Time Appointment Department ([place])    Apr 29, 2024  9:30 AM CDT Follow Up Visit with Jo Ann Wilson DO [facility] ([facility])    Contact your primary care provider if your insurance requires a referral.    Please arrive 15 minutes prior to your scheduled appointment. Be sure to bring your current Insurance card, Photo ID, and medication bottles or a list of your current medications.      A 24 hour notice is required to cancel any appointment or you may be charged a $40 No Show Fee.     Important: 24 hour notice is required to cancel any appointment or you may be charged a $40 No Show Fee. Please notify your physician office.         May 13, 2024  9:00 AM CDT Established Patient with Dain Lew MD [facility] ([facility])        Jul 08, 2024  9:15 AM CDT Follow Up Visit with Cesar Hutchinson MD [facility] ([place] [id number])              [facility]  [address]  [facility] [id number] [phone] [facility]  [address]  [facility] [id number] [phone] [facility]  [facility]  [address]  [facility] [id number] [phone]          Last A1c value was 6.5% done 12/7/2023.    Refill 1/29/24  LOV 12/18/23 no FU

## 2024-04-09 ENCOUNTER — ORDER TRANSCRIPTION (OUTPATIENT)
Dept: ADMINISTRATIVE | Facility: HOSPITAL | Age: 69
End: 2024-04-09

## 2024-04-09 DIAGNOSIS — I25.10 CAD IN NATIVE ARTERY: ICD-10-CM

## 2024-04-09 DIAGNOSIS — I35.9 AORTIC VALVE CALCIFICATION: Primary | ICD-10-CM

## 2024-04-09 DIAGNOSIS — J44.9 COPD (CHRONIC OBSTRUCTIVE PULMONARY DISEASE) (HCC): ICD-10-CM

## 2024-04-09 RX ORDER — INSULIN GLARGINE 300 U/ML
INJECTION, SOLUTION SUBCUTANEOUS
Qty: 12 ML | Refills: 1 | Status: SHIPPED | OUTPATIENT
Start: 2024-04-09

## 2024-04-09 NOTE — TELEPHONE ENCOUNTER
LOV: 12/2023  Future Appointments   Date Time Provider Department Center   4/29/2024  9:30 AM Jo Ann Wilson DO [facility] [place]   5/13/2024  9:00 AM Dain Lew MD [facility]   7/8/2024  9:15 AM Cesar Hutchinson MD [id number] 4   8/20/2024 12:00 PM Elisa Mata APRN [facility]     Last A1c value was 6.5% done 12/7/2023.

## 2024-04-09 NOTE — TELEPHONE ENCOUNTER
Pt is wearing dexcom  Needs appt before June 2024 to avoid refill disruption of CGM     May6th is open in [place]

## 2024-04-23 ENCOUNTER — LAB ENCOUNTER (OUTPATIENT)
Dept: LAB | Age: 69
End: 2024-04-23
Attending: FAMILY MEDICINE
Payer: MEDICARE

## 2024-04-23 DIAGNOSIS — E55.9 VITAMIN D DEFICIENCY: ICD-10-CM

## 2024-04-23 DIAGNOSIS — Z87.39 HISTORY OF GOUT: ICD-10-CM

## 2024-04-23 DIAGNOSIS — E11.69 TYPE 2 DIABETES MELLITUS WITH OBESITY (HCC): ICD-10-CM

## 2024-04-23 DIAGNOSIS — E11.40 TYPE 2 DIABETES MELLITUS WITH DIABETIC NEUROPATHY, WITH LONG-TERM CURRENT USE OF INSULIN (HCC): ICD-10-CM

## 2024-04-23 DIAGNOSIS — E66.9 TYPE 2 DIABETES MELLITUS WITH OBESITY (HCC): ICD-10-CM

## 2024-04-23 DIAGNOSIS — Z90.79 S/P TURP (TRANSURETHRAL RESECTION OF PROSTATE): ICD-10-CM

## 2024-04-23 DIAGNOSIS — E78.5 DYSLIPIDEMIA: ICD-10-CM

## 2024-04-23 DIAGNOSIS — I10 ESSENTIAL HYPERTENSION: ICD-10-CM

## 2024-04-23 DIAGNOSIS — Z79.4 TYPE 2 DIABETES MELLITUS WITH DIABETIC NEUROPATHY, WITH LONG-TERM CURRENT USE OF INSULIN (HCC): ICD-10-CM

## 2024-04-23 DIAGNOSIS — Z13.0 SCREENING FOR DEFICIENCY ANEMIA: ICD-10-CM

## 2024-04-23 DIAGNOSIS — R35.0 FREQUENCY OF MICTURITION: ICD-10-CM

## 2024-04-23 LAB
ALBUMIN SERPL-MCNC: 3.5 G/DL (ref 3.4–5)
ALBUMIN/GLOB SERPL: 0.9 {RATIO} (ref 1–2)
ALP LIVER SERPL-CCNC: 85 U/L
ALT SERPL-CCNC: 33 U/L
ANION GAP SERPL CALC-SCNC: 6 MMOL/L (ref 0–18)
AST SERPL-CCNC: 16 U/L (ref 15–37)
BASOPHILS # BLD AUTO: 0.07 X10(3) UL (ref 0–0.2)
BASOPHILS NFR BLD AUTO: 0.9 %
BILIRUB SERPL-MCNC: 0.5 MG/DL (ref 0.1–2)
BILIRUB UR QL STRIP.AUTO: NEGATIVE
BUN BLD-MCNC: 34 MG/DL (ref 9–23)
CALCIUM BLD-MCNC: 9.5 MG/DL (ref 8.5–10.1)
CHLORIDE SERPL-SCNC: 104 MMOL/L (ref 98–112)
CHOLEST SERPL-MCNC: 149 MG/DL (ref ?–200)
CLARITY UR REFRACT.AUTO: CLEAR
CO2 SERPL-SCNC: 26 MMOL/L (ref 21–32)
CREAT BLD-MCNC: 1.45 MG/DL
CREAT UR-SCNC: 101 MG/DL
EGFRCR SERPLBLD CKD-EPI 2021: 52 ML/MIN/1.73M2 (ref 60–?)
EOSINOPHIL # BLD AUTO: 0.25 X10(3) UL (ref 0–0.7)
EOSINOPHIL NFR BLD AUTO: 3.1 %
ERYTHROCYTE [DISTWIDTH] IN BLOOD BY AUTOMATED COUNT: 14.4 %
EST. AVERAGE GLUCOSE BLD GHB EST-MCNC: 140 MG/DL (ref 68–126)
FASTING PATIENT LIPID ANSWER: YES
FASTING STATUS PATIENT QL REPORTED: YES
GLOBULIN PLAS-MCNC: 3.9 G/DL (ref 2.8–4.4)
GLUCOSE BLD-MCNC: 174 MG/DL (ref 70–99)
GLUCOSE UR STRIP.AUTO-MCNC: NORMAL MG/DL
HBA1C MFR BLD: 6.5 % (ref ?–5.7)
HCT VFR BLD AUTO: 46 %
HDLC SERPL-MCNC: 42 MG/DL (ref 40–59)
HGB BLD-MCNC: 15.2 G/DL
IMM GRANULOCYTES # BLD AUTO: 0.03 X10(3) UL (ref 0–1)
IMM GRANULOCYTES NFR BLD: 0.4 %
KETONES UR STRIP.AUTO-MCNC: NEGATIVE MG/DL
LDLC SERPL CALC-MCNC: 82 MG/DL (ref ?–100)
LEUKOCYTE ESTERASE UR QL STRIP.AUTO: 75
LYMPHOCYTES # BLD AUTO: 2.49 X10(3) UL (ref 1–4)
LYMPHOCYTES NFR BLD AUTO: 30.4 %
MCH RBC QN AUTO: 29.2 PG (ref 26–34)
MCHC RBC AUTO-ENTMCNC: 33 G/DL (ref 31–37)
MCV RBC AUTO: 88.5 FL
MICROALBUMIN UR-MCNC: 2.19 MG/DL
MICROALBUMIN/CREAT 24H UR-RTO: 21.7 UG/MG (ref ?–30)
MONOCYTES # BLD AUTO: 0.46 X10(3) UL (ref 0.1–1)
MONOCYTES NFR BLD AUTO: 5.6 %
NEUTROPHILS # BLD AUTO: 4.89 X10 (3) UL (ref 1.5–7.7)
NEUTROPHILS # BLD AUTO: 4.89 X10(3) UL (ref 1.5–7.7)
NEUTROPHILS NFR BLD AUTO: 59.6 %
NONHDLC SERPL-MCNC: 107 MG/DL (ref ?–130)
OSMOLALITY SERPL CALC.SUM OF ELEC: 294 MOSM/KG (ref 275–295)
PH UR STRIP.AUTO: 5.5 [PH] (ref 5–8)
PLATELET # BLD AUTO: 219 10(3)UL (ref 150–450)
POTASSIUM SERPL-SCNC: 4.7 MMOL/L (ref 3.5–5.1)
PROT SERPL-MCNC: 7.4 G/DL (ref 6.4–8.2)
PROT UR STRIP.AUTO-MCNC: NEGATIVE MG/DL
PSA SERPL-MCNC: 0.19 NG/ML (ref ?–4)
RBC # BLD AUTO: 5.2 X10(6)UL
RBC UR QL AUTO: NEGATIVE
SODIUM SERPL-SCNC: 136 MMOL/L (ref 136–145)
SP GR UR STRIP.AUTO: 1.02 (ref 1–1.03)
TRIGL SERPL-MCNC: 140 MG/DL (ref 30–149)
TSI SER-ACNC: 1.91 MIU/ML (ref 0.36–3.74)
URATE SERPL-MCNC: 9.5 MG/DL
UROBILINOGEN UR STRIP.AUTO-MCNC: NORMAL MG/DL
VIT D+METAB SERPL-MCNC: 39 NG/ML (ref 30–100)
VLDLC SERPL CALC-MCNC: 22 MG/DL (ref 0–30)
WBC # BLD AUTO: 8.2 X10(3) UL (ref 4–11)

## 2024-04-23 PROCEDURE — 84443 ASSAY THYROID STIM HORMONE: CPT

## 2024-04-23 PROCEDURE — 85025 COMPLETE CBC W/AUTO DIFF WBC: CPT

## 2024-04-23 PROCEDURE — 80053 COMPREHEN METABOLIC PANEL: CPT

## 2024-04-23 PROCEDURE — 81001 URINALYSIS AUTO W/SCOPE: CPT

## 2024-04-23 PROCEDURE — 82306 VITAMIN D 25 HYDROXY: CPT

## 2024-04-23 PROCEDURE — 80061 LIPID PANEL: CPT

## 2024-04-23 PROCEDURE — 84153 ASSAY OF PSA TOTAL: CPT

## 2024-04-23 PROCEDURE — 84550 ASSAY OF BLOOD/URIC ACID: CPT

## 2024-04-23 PROCEDURE — 83036 HEMOGLOBIN GLYCOSYLATED A1C: CPT

## 2024-04-23 PROCEDURE — 82043 UR ALBUMIN QUANTITATIVE: CPT

## 2024-04-23 PROCEDURE — 82570 ASSAY OF URINE CREATININE: CPT

## 2024-04-24 ENCOUNTER — PATIENT MESSAGE (OUTPATIENT)
Dept: FAMILY MEDICINE CLINIC | Facility: CLINIC | Age: 69
End: 2024-04-24

## 2024-04-24 ENCOUNTER — LAB ENCOUNTER (OUTPATIENT)
Dept: LAB | Age: 69
End: 2024-04-24
Attending: FAMILY MEDICINE
Payer: MEDICARE

## 2024-04-24 DIAGNOSIS — R82.90 ABNORMAL URINALYSIS: ICD-10-CM

## 2024-04-24 PROCEDURE — 87077 CULTURE AEROBIC IDENTIFY: CPT

## 2024-04-24 PROCEDURE — 87086 URINE CULTURE/COLONY COUNT: CPT

## 2024-04-24 NOTE — TELEPHONE ENCOUNTER
Dallin pickens pt's mcm     Specimen Collected: 01/03/24  9:52 AM  >100,000 CFU/ML Staphylococcus species, not aureus Abnormal   Cesar Hutchinson MD  1/5/2024  2:12 PM CST Back to Top      No UTI sx at present. Will not treat unless he develops UTI symptoms

## 2024-04-24 NOTE — TELEPHONE ENCOUNTER
From: Eric Rodriguez  To: Jo Ann Wilson  Sent: 4/24/2024 1:10 PM CDT  Subject: Urine culture    On my way home from giving the urine sample I remembered that Dr Hutchinson sees something. I don’t know it is what you are seeing. He doesn’t give me an antibiotic but says if I develop any symptoms , to give him a call. The lab results from his visit are in my chart. Unfortunately I am leaving for [facility] early tomorrow, will be home Sunday. I feel fine right now

## 2024-04-26 RX ORDER — METOPROLOL TARTRATE 100 MG/1
TABLET ORAL
COMMUNITY
Start: 2024-04-09

## 2024-04-29 ENCOUNTER — OFFICE VISIT (OUTPATIENT)
Dept: FAMILY MEDICINE CLINIC | Facility: CLINIC | Age: 69
End: 2024-04-29
Payer: MEDICARE

## 2024-04-29 VITALS
HEIGHT: 70 IN | BODY MASS INDEX: 35.15 KG/M2 | WEIGHT: 245.5 LBS | SYSTOLIC BLOOD PRESSURE: 128 MMHG | OXYGEN SATURATION: 97 % | HEART RATE: 84 BPM | DIASTOLIC BLOOD PRESSURE: 74 MMHG | RESPIRATION RATE: 18 BRPM

## 2024-04-29 DIAGNOSIS — F17.200 CURRENT SMOKER: ICD-10-CM

## 2024-04-29 DIAGNOSIS — I35.0 MILD AORTIC STENOSIS: ICD-10-CM

## 2024-04-29 DIAGNOSIS — Z90.79 S/P TURP (TRANSURETHRAL RESECTION OF PROSTATE): ICD-10-CM

## 2024-04-29 DIAGNOSIS — Z71.85 VACCINE COUNSELING: ICD-10-CM

## 2024-04-29 DIAGNOSIS — E11.40 TYPE 2 DIABETES MELLITUS WITH DIABETIC NEUROPATHY, WITH LONG-TERM CURRENT USE OF INSULIN (HCC): Primary | ICD-10-CM

## 2024-04-29 DIAGNOSIS — K44.9 DIAPHRAGMATIC HERNIA WITHOUT OBSTRUCTION AND WITHOUT GANGRENE: ICD-10-CM

## 2024-04-29 DIAGNOSIS — I34.81 MITRAL VALVE ANNULAR CALCIFICATION: ICD-10-CM

## 2024-04-29 DIAGNOSIS — Z87.39 HISTORY OF GOUT: ICD-10-CM

## 2024-04-29 DIAGNOSIS — E78.5 DYSLIPIDEMIA: ICD-10-CM

## 2024-04-29 DIAGNOSIS — E66.9 OBESITY (BMI 30-39.9): ICD-10-CM

## 2024-04-29 DIAGNOSIS — I35.9 AORTIC VALVE CALCIFICATION: ICD-10-CM

## 2024-04-29 DIAGNOSIS — I25.10 CORONARY ARTERY DISEASE INVOLVING NATIVE CORONARY ARTERY OF NATIVE HEART WITHOUT ANGINA PECTORIS: ICD-10-CM

## 2024-04-29 DIAGNOSIS — E55.9 VITAMIN D DEFICIENCY: ICD-10-CM

## 2024-04-29 DIAGNOSIS — J44.9 CHRONIC OBSTRUCTIVE PULMONARY DISEASE, UNSPECIFIED COPD TYPE (HCC): ICD-10-CM

## 2024-04-29 DIAGNOSIS — I10 ESSENTIAL HYPERTENSION: ICD-10-CM

## 2024-04-29 DIAGNOSIS — Z87.891 PERSONAL HISTORY OF NICOTINE DEPENDENCE: ICD-10-CM

## 2024-04-29 DIAGNOSIS — Z79.899 MEDICATION MANAGEMENT: ICD-10-CM

## 2024-04-29 DIAGNOSIS — Z79.4 TYPE 2 DIABETES MELLITUS WITH DIABETIC NEUROPATHY, WITH LONG-TERM CURRENT USE OF INSULIN (HCC): Primary | ICD-10-CM

## 2024-04-29 DIAGNOSIS — K22.719 BARRETT'S ESOPHAGUS WITH DYSPLASIA: ICD-10-CM

## 2024-04-29 NOTE — PROGRESS NOTES
HPI:   Eric Rodriguez is a 68 year old male who presents for recheck of his diabetes, hypertension and dyslipidemia. Patient has  been checking his FBS's at home regularly -- has not checked recently.  Last eye exam -- 7/24/23  Trujeo 40 units in the AM and appt May 6 with Elisa  Novolog -- 16 in AM and 10 at other meals.    Patient presents for recheck of his hypertension. Pt has been taking medications as instructed, no medication side effects, home BP monitoring in the range of 120's systolic and 70-80's diastolic.  His cholesterol is stable. He has been taking his Vytorin as ordered. He denies any side effects of the medications.  Lab work reviewed with the patient.  Also, he is a current every day smoker, he reports he smokes about 1 ppd.   He is aware he needs to quit  Counseling provided and smoking cessation advised. He will consider cessation but not now.  Had echo per Dr. Issa  -- done 4/18/24  Will have CT angio on May 22.    Wt Readings from Last 6 Encounters:   04/29/24 245 lb 8 oz (111.4 kg)   12/18/23 246 lb 12.8 oz (111.9 kg)   12/12/23 247 lb (112 kg)   08/15/23 256 lb 9.6 oz (116.4 kg)   08/04/23 257 lb (116.6 kg)   07/11/23 255 lb (115.7 kg)     Body mass index is 35.23 kg/m².     Results for orders placed or performed in visit on 04/24/24   Urine Culture, Routine [E]    Specimen: Urine, clean catch   Result Value Ref Range    Urine Culture >100,000 CFU/ML Staphylococcus epidermidis (A)        Current Outpatient Medications   Medication Sig Dispense Refill    metoprolol tartrate 100 MG Oral Tab       Insulin Glargine, 2 Unit Dial, (TOUJEO MAX SOLOSTAR) 300 UNIT/ML Subcutaneous Solution Pen-injector Use up to 65 units daily as directed 12 mL 1    mometasone 0.1 % External Ointment Apply 1 Application topically in the morning and 1 Application before bedtime. 45 g 1    NOVOLOG FLEXPEN 100 UNIT/ML Subcutaneous Solution Pen-injector INJECT 20 units INTO THE SKIN before each meal 60 mL 0     HYDROCHLOROTHIAZIDE 25 MG Oral Tab TAKE 1 TABLET BY MOUTH ONE TIME DAILY 90 tablet 0    OLMESARTAN MEDOXOMIL 40 MG Oral Tab TAKE 1 TABLET BY MOUTH ONE TIME DAILY 90 tablet 0    EZETIMIBE-SIMVASTATIN 10-40 MG Oral Tab TAKE ONE TABLET BY MOUTH EVERY OTHER DAY 45 tablet 1    omega-3-acid ethyl esters 1 g Oral Cap TAKE 2 CAPSULES BY MOUTH TWICE A  capsule 1    finasteride 5 MG Oral Tab Take 1 tablet (5 mg total) by mouth daily. 90 tablet 6    tamsulosin 0.4 MG Oral Cap Take 1 capsule (0.4 mg total) by mouth daily. Take 1/2 hour following the same meal each day 90 capsule 5    semaglutide 8 MG/3ML Subcutaneous Solution Pen-injector Inject 2 mg into the skin once a week. 9 mL 1    metFORMIN HCl 1000 MG Oral Tab TAKE ONE TABLET BY MOUTH TWICE A DAY WITH MEALS 180 tablet 1    Continuous Blood Gluc Sensor (DEXCOM G7 SENSOR) Does not apply Misc 1 each Every 10 days. 9 each 2    omeprazole 20 MG Oral Capsule Delayed Release Take 1 capsule (20 mg total) by mouth every morning before breakfast.      Fluticasone Propionate 50 MCG/ACT Nasal Suspension 2 sprays by Each Nare route daily as needed for Rhinitis.      Glucose Blood (CONTOUR NEXT TEST) In Vitro Strip 1 each by Other route 4 (four) times daily with meals and nightly. 400 each 3    Tiotropium Bromide Monohydrate (SPIRIVA HANDIHALER) 18 MCG Inhalation Cap Inhale 1 capsule (18 mcg total) into the lungs daily. 90 capsule 1    Insulin Pen Needle (BD PEN NEEDLE SHORT U/F) 31G X 8 MM Does not apply Misc Please disregard previous qty on this. Pt uses up to 4x a day. Pt uses 360 a month~ 400 each 1    Niacin  MG Oral Tab CR Take 1 tablet (500 mg total) by mouth 2 (two) times daily with meals.      Multiple Vitamins-Minerals (CENTRUM SILVER OR) Take by mouth.          Allergies   Allergen Reactions    Dander      dog    Other UNKNOWN    Penicillins DIARRHEA    Tetanus Toxoids UNKNOWN      Past Medical History:    Elizondo's esophagus    Blood in urine    Enlarged  sampson, heavy for week after catheter inserted    COPD (chronic obstructive pulmonary disease) (HCC)    Diabetes mellitus (HCC)    Disorder of prostate    Turp scheduled for [facility] 3    Esophageal reflux    Frequent urination    Hemorrhoids    Dr Nieves rubber banded 1.5 years ago    Hiatal hernia    High cholesterol    Painful urination    Sleep apnea    Unspecified essential hypertension    Unspecified gastritis and gastroduodenitis without mention of hemorrhage    Weight loss      Past Surgical History:   Procedure Laterality Date    Colonoscopy  03/30/2007    Colonoscopy  2013    Dr. 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 Gil; due in 2 years    Other surgical history N/A 7/2/2015    Procedure: ESOPHAGOGASTRODUODENOSCOPY (EGD);  Surgeon: Crescencio Gil MD;  Location:  ENDOSCOPY      Social History:   Social History     Socioeconomic History    Marital status:    Tobacco Use    Smoking status: Every Day     Current packs/day: 1.00     Average packs/day: 1 pack/day for 30.0 years (30.0 ttl pk-yrs)     Types: Cigarettes    Smokeless tobacco: Never    Tobacco comments:     1.0 pack weekends, only 4 cigs QD week days   Vaping Use    Vaping status: Never Used   Substance and Sexual Activity    Alcohol use: Not Currently     Alcohol/week: 0.0 standard drinks of alcohol     Comment: 2-3 in the last 3 months    Drug use: No    Sexual activity: Yes   Other Topics Concern    Caffeine Concern Yes     Comment: 3-4 coffee daily    Exercise Yes     Comment: occ     Social Determinants of Health     Physical Activity: Unknown (3/10/2021)    [facility]    Exercise Vital Sign     Days of Exercise per Week: 6 days     Exercise:  around the house .  Diet: watches fats closely, watches sugar closely and watches calories closely     REVIEW OF SYSTEMS:   GENERAL HEALTH: feels well otherwise  SKIN: denies any unusual skin lesions or rashes  EYES: denies blurry vision or eye pain  RESPIRATORY: denies shortness of breath with exertion  CARDIOVASCULAR: denies chest pain on exertion  GI: denies abdominal pain and denies nausea or vomitting  NEURO: denies headaches, dizziness, numbness or tingling  MUSCULOSKELETAL: denies any joint aches or pain. Denies any muscle aches or cramps.    EXAM:   /74 (BP Location: Right arm, Patient Position: Sitting, Cuff Size: adult)   Pulse 84   Resp 18   Ht 5' 10\" (1.778 m)   Wt 245 lb 8 oz (111.4 kg)   SpO2 97%   BMI 35.23 kg/m²   GENERAL: well developed, well nourished,in no apparent distress, pleasant  SKIN: no rashes,no suspicious lesions  NECK: supple,no adenopathy,no bruits; no thyromegaly  LUNGS: clear to auscultation bilaterally; no rhonchi, rales or wheezing  CARDIO: RRR with 2/6 murmur  GI: good BS's,soft, non-tender, nondistended, no masses.  EXTREMITIES: no cyanosis, clubbing or edema; removed shoes and socks  2+ dorsalis pedis pulses bilaterally; skin of the feet examined and intact bilaterally; onychomycosis -- good nail is growing in on his left foot  Bilateral barefoot skin diabetic exam is normal, visualized feet and the appearance is normal.  Bilateral monofilament/sensation of both feet is normal   Pulsation pedal pulse exam of both lower legs/feet is normal as well.    ASSESSMENT AND PLAN:   Eric Rodriguez is a 68 year old male who presents for a recheck of his diabetes, HTN, and dyslipidemia.   Diabetic control is good    Recommendations are: continue present meds, check HgbA1C, check fasting lipids, CMP in 3 months.   Advised to continue to focus on healthier eating and exercise and weight  loss. Discussed carbohydrate controlled diet.   Continue to check feet daily.    Cont. Trujeo and novolog.  Cont. With DM center.     Colonoscopy due in 2024.  Hx of TURP. 6/2021  Seeing Dr. Hutchinson.  Seeing Dr. Issa for his heart.      Smoking -- still smoking. 1-1.5 ppd Stable on Spiriva for COPD. Advised strongly to quit smoking.  He will think about it.  Vitamin d def -- stable  Aortic stenosis -- stable  Hx of gout -- Uric acid is stable.  HTN -- stable, continue monitoring BP at home regularly.  COPD -- stable on spiriva  DYSLIPIDEMIA-- Continue Vytorin QOD.  Takes it MWF. Continue working on diet and exercise.  GERD -- stable  Obese -- advised to lose 10 lbs.  Barett's -- sees Dr. Jeffery Durant in 8/2024      Encounter Diagnoses   Name Primary?    Type 2 diabetes mellitus with diabetic neuropathy, with long-term current use of insulin (HCC) Yes    Coronary artery disease involving native coronary artery of native heart without angina pectoris     Essential hypertension     Dyslipidemia     Current smoker     Personal history of nicotine dependence     Chronic obstructive pulmonary disease, unspecified COPD type (HCC)     Elizondo's esophagus with dysplasia     Aortic valve calcification     Mitral valve annular calcification     Mild aortic stenosis     History of gout     Diaphragmatic hernia without obstruction and without gangrene     S/P TURP (transurethral resection of prostate)     Vitamin D deficiency     Obesity (BMI 30-39.9)     Medication management     Vaccine counseling        Orders Placed This Encounter   Procedures    Comp Metabolic Panel (14)    Lipid Panel    Hemoglobin A1C    Uric Acid       Meds & Refills for this Visit:  Requested Prescriptions      No prescriptions requested or ordered in this encounter       Imaging & Consults:  None    The patient indicates understanding of these issues and agrees to the plan.  The patient is asked to return in Return in about 3 months (around 7/29/2024)  for AWV/Med check, weight check.

## 2024-05-06 ENCOUNTER — OFFICE VISIT (OUTPATIENT)
Dept: ENDOCRINOLOGY CLINIC | Facility: CLINIC | Age: 69
End: 2024-05-06
Payer: MEDICARE

## 2024-05-06 VITALS
DIASTOLIC BLOOD PRESSURE: 62 MMHG | RESPIRATION RATE: 16 BRPM | HEART RATE: 80 BPM | WEIGHT: 244 LBS | BODY MASS INDEX: 35 KG/M2 | OXYGEN SATURATION: 97 % | SYSTOLIC BLOOD PRESSURE: 124 MMHG

## 2024-05-06 DIAGNOSIS — E11.59 HYPERTENSION ASSOCIATED WITH TYPE 2 DIABETES MELLITUS (HCC): ICD-10-CM

## 2024-05-06 DIAGNOSIS — Z79.4 TYPE 2 DIABETES MELLITUS WITH HYPERGLYCEMIA, WITH LONG-TERM CURRENT USE OF INSULIN (HCC): ICD-10-CM

## 2024-05-06 DIAGNOSIS — I25.10 CORONARY ARTERY DISEASE INVOLVING NATIVE CORONARY ARTERY OF NATIVE HEART WITHOUT ANGINA PECTORIS: Primary | ICD-10-CM

## 2024-05-06 DIAGNOSIS — E11.69 DYSLIPIDEMIA ASSOCIATED WITH TYPE 2 DIABETES MELLITUS (HCC): ICD-10-CM

## 2024-05-06 DIAGNOSIS — E11.65 TYPE 2 DIABETES MELLITUS WITH HYPERGLYCEMIA, WITH LONG-TERM CURRENT USE OF INSULIN (HCC): ICD-10-CM

## 2024-05-06 DIAGNOSIS — E78.5 DYSLIPIDEMIA ASSOCIATED WITH TYPE 2 DIABETES MELLITUS (HCC): ICD-10-CM

## 2024-05-06 DIAGNOSIS — I15.2 HYPERTENSION ASSOCIATED WITH TYPE 2 DIABETES MELLITUS (HCC): ICD-10-CM

## 2024-05-06 DIAGNOSIS — N18.31 CKD STAGE 3A, GFR 45-59 ML/MIN (HCC): ICD-10-CM

## 2024-05-06 PROCEDURE — 99214 OFFICE O/P EST MOD 30 MIN: CPT | Performed by: NURSE PRACTITIONER

## 2024-05-06 PROCEDURE — 95251 CONT GLUC MNTR ANALYSIS I&R: CPT | Performed by: NURSE PRACTITIONER

## 2024-05-06 RX ORDER — ACYCLOVIR 400 MG/1
1 TABLET ORAL
COMMUNITY
Start: 2024-05-06

## 2024-05-06 RX ORDER — INSULIN ASPART 100 [IU]/ML
INJECTION, SOLUTION INTRAVENOUS; SUBCUTANEOUS
Qty: 45 ML | Refills: 0 | OUTPATIENT
Start: 2024-05-06

## 2024-05-06 NOTE — PROGRESS NOTES
Chief Complaint   Patient presents with    Diabetes     Follow up streaming dexcom          Eric Rodriguez is a 68 year old presenting for type 2 diabetes management.   Primary care physician: Jo Ann Wilson DO  Last DM appt       Most recent  A1C 6.5 ( last A1C 6.5% )     Due to cost concerns, he is using 2mg ozempic pen and dosing 0.5mg (36 clicks weekly)     HX TURP and prior UTIs but nothing recent  Had + nitrite on recent urinalysis but no sxs.   See PCP notes         Dexcom download: 4- thru 5-5-2024     Coefficient of variation: 21.2%   GMI: estimated A1C 7.2%       Average glucose: 157 mg/dl (last download 162 mg/dl )      73 % In target range(70-180mg/dl) ( last download: 72%)          26%High glucose targets (> 180mg/dl) :  last download: 27%)       1%Very high glucose targets:  (> 250mg/dl)  last download: 1%)       0%Low glucose targets:(less than 70mg/dl)  last download: 0%)       0 %Very Low glucose targets: (< 54mg/dl )  last download: <1%)       Findings:   Hypoglycemia: rare . No pattern    Time in target: 73%  (ADA recommended target > 70%)         Diabetes History:  Type 2 DM: ~ 2008   Patient has not had hospitalizations for blood sugar issues  denies any history of pancreatitis      Previous DM therapies:  Glyburide ; change in therapy   Janumet : change in therapy   Levemir : 8-2023 : concentrated basal rx       Current DM Regimen:  Metformin 1000mg twice daily   Ozempic 0.5mg subcutaneous once weekly (dosing on 2mg pen)    Toujeo max solo star: 40 units once daily in AM   Novolog flex pen: 12 units Breakfast, 10 units dinner       HGBA1C:    Lab Results   Component Value Date    A1C 6.5 (H) 04/23/2024    A1C 6.5 (H) 12/07/2023    A1C 6.8 (H) 07/07/2023     (H) 04/23/2024       Lab Results   Component Value Date    CHOLEST 149 04/23/2024    CHOLEST 153 12/07/2023    TRIG 140 04/23/2024    TRIG 155 (H) 12/07/2023    HDL 42 04/23/2024    HDL 44 12/07/2023    LDL 82  04/23/2024    LDL 82 12/07/2023     Lab Results   Component Value Date    MICROALBCREA 21.7 04/23/2024    MICROALBCREA 68.5 (H) 12/07/2023      Lab Results   Component Value Date    CREATSERUM 1.45 (H) 04/23/2024    CREATSERUM 1.32 (H) 12/07/2023    EGFRCR 52 (L) 04/23/2024    EGFRCR 58 (L) 12/07/2023     Lab Results   Component Value Date    AST 16 04/23/2024    AST 15 12/07/2023    ALT 33 04/23/2024    ALT 34 12/07/2023       Lab Results   Component Value Date    TSH 1.910 04/23/2024    TSH 1.500 02/17/2023           DM Complications:  Microvascular:   Neuropathy: no  Retinopathy: no  Nephropathy: yes    Macrovascular:  PVD: no  CAD: yes Dr Issa . Medical management (no hx CABG or stents)   Stroke/CVA: no        Modifying factors:  Medication adherence: yes     Recent steroids, illness or infections ( past 3m): no     Allergies: Dander, Other, Penicillins, and Tetanus toxoids    Past Medical History:    Elizondo's esophagus    Blood in urine    Enlarged badder, heavy for week after catheter inserted    COPD (chronic obstructive pulmonary disease) (HCC)    Diabetes mellitus (HCC)    Disorder of prostate    Turp scheduled for Merlyn 3    Esophageal reflux    Frequent urination    Hemorrhoids    Dr Nieves rubber banded 1.5 years ago    Hiatal hernia    High cholesterol    Painful urination    Sleep apnea    Unspecified essential hypertension    Unspecified gastritis and gastroduodenitis without mention of hemorrhage    Weight loss     Past Surgical History:   Procedure Laterality Date    Colonoscopy  03/30/2007    Colonoscopy  2013    Dr. 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 Gil; due  in 2 years    Other surgical history N/A 7/2/2015    Procedure: ESOPHAGOGASTRODUODENOSCOPY (EGD);  Surgeon: Crescencio Gil MD;  Location:  ENDOSCOPY     Social History     Socioeconomic History    Marital status:    Tobacco Use    Smoking status: Every Day     Current packs/day: 1.00     Average packs/day: 1 pack/day for 30.0 years (30.0 ttl pk-yrs)     Types: Cigarettes    Smokeless tobacco: Never    Tobacco comments:     1.0 pack weekends, only 4 cigs QD week days   Vaping Use    Vaping status: Never Used   Substance and Sexual Activity    Alcohol use: Not Currently     Alcohol/week: 0.0 standard drinks of alcohol     Comment: 2-3 in the last 3 months    Drug use: No    Sexual activity: Yes   Other Topics Concern    Caffeine Concern Yes     Comment: 3-4 coffee daily    Exercise Yes     Comment: occ     Social Determinants of Health     Physical Activity: Unknown (3/10/2021)    [facility]    Exercise Vital Sign     Days of Exercise per Week: 6 days     Family History   Problem Relation Age of Onset    Other (CHF[other]) Mother     Diabetes Mother         Type II    Diabetes Father         Type II    Other (Other[other]) Father     Stroke Father     Other (Amputation[other]) Brother         leg below knee    Diabetes Brother     Cancer Sister         colon    Diabetes Brother         Type II    Other (Diabetic retinopathy[other]) Brother     Cancer Sister         liver    Cancer Sister         ovarian     Current Medication List:   Current Outpatient Medications   Medication Sig Dispense Refill    metFORMIN HCl 1000 MG Oral Tab Take 1 tablet (1,000 mg total) by mouth 2 (two) times daily with meals. 180 tablet 1    Continuous Glucose Sensor (DEXCOM G7 SENSOR) Does not apply Misc 1 each Every 10 days.      NOVOLOG FLEXPEN 100 UNIT/ML Subcutaneous Solution Pen-injector TDD = 50 units  before meals 3 x daily as directed 45 mL 0    metoprolol tartrate 100 MG  Oral Tab       Insulin Glargine, 2 Unit Dial, (TOUJEO MAX SOLOSTAR) 300 UNIT/ML Subcutaneous Solution Pen-injector Use up to 65 units daily as directed 12 mL 1    HYDROCHLOROTHIAZIDE 25 MG Oral Tab TAKE 1 TABLET BY MOUTH ONE TIME DAILY 90 tablet 0    OLMESARTAN MEDOXOMIL 40 MG Oral Tab TAKE 1 TABLET BY MOUTH ONE TIME DAILY 90 tablet 0    EZETIMIBE-SIMVASTATIN 10-40 MG Oral Tab TAKE ONE TABLET BY MOUTH EVERY OTHER DAY 45 tablet 1    omega-3-acid ethyl esters 1 g Oral Cap TAKE 2 CAPSULES BY MOUTH TWICE A  capsule 1    finasteride 5 MG Oral Tab Take 1 tablet (5 mg total) by mouth daily. 90 tablet 6    tamsulosin 0.4 MG Oral Cap Take 1 capsule (0.4 mg total) by mouth daily. Take 1/2 hour following the same meal each day 90 capsule 5    semaglutide 8 MG/3ML Subcutaneous Solution Pen-injector Inject 2 mg into the skin once a week. 9 mL 1    Tiotropium Bromide Monohydrate (SPIRIVA HANDIHALER) 18 MCG Inhalation Cap Inhale 1 capsule (18 mcg total) into the lungs daily. 90 capsule 1    Multiple Vitamins-Minerals (CENTRUM SILVER OR) Take by mouth.        mometasone 0.1 % External Ointment Apply 1 Application topically in the morning and 1 Application before bedtime. 45 g 1    omeprazole 20 MG Oral Capsule Delayed Release Take 1 capsule (20 mg total) by mouth every morning before breakfast. (Patient not taking: Reported on 5/6/2024)      Fluticasone Propionate 50 MCG/ACT Nasal Suspension 2 sprays by Each Nare route daily as needed for Rhinitis.      Glucose Blood (CONTOUR NEXT TEST) In Vitro Strip 1 each by Other route 4 (four) times daily with meals and nightly. 400 each 3    Insulin Pen Needle (BD PEN NEEDLE SHORT U/F) 31G X 8 MM Does not apply Misc Please disregard previous qty on this. Pt uses up to 4x a day. Pt uses 360 a month~ 400 each 1    Niacin  MG Oral Tab CR Take 1 tablet (500 mg total) by mouth 2 (two) times daily with meals.             DM associated review of  symptoms:   Endocrine: Polyuria,  polyphagia, polydipsia: no  Neurological: Paresthesias: yes   HEENT: Blurred vision: no  Skin: no rash or wounds  Hematological: Hypoglycemia: no      Review of Systems     LUNGS: denies shortness of breath   CARDIOVASCULAR: denies chest pain  GI: denies abdominal pain, nausea or diarrhea   : denies dysuria      Physical exam:  /62   Pulse 80   Resp 16   Wt 244 lb (110.7 kg)   SpO2 97%   BMI 35.01 kg/m²   Body mass index is 35.01 kg/m².    Physical Exam     Constitutional: Normal appearance   Cardiovascular: Normal rate   Pulmonary/Chest: Effort normal  Neurological: Alert and oriented .   Psychiatric: Normal mood and affect.         Assessment/Plan:    Hypertension  Well controlled but needs ongoing monitoring   CPM       Dyslipidemia:   Last  labs 4-2024   Improved trigs   Continue vytorin 10-40mg  rx         Obesity   Weight 244   lb   Continue glp1 rx - see DM     CAD   Per ADA standards of care, guidelines, in patients with type 2 diabetes and established ASCVD or indicators of high CV risk dsease,  incorporating agents proven to reduce major adverse CV events and CV mortality is indicated  Continue glp1 rx - see DM      CKD stage 3 a   Reviewed benefit of SGLT2i rx- will investigate cost  Upcoming release of FLOW trial is showing benefit w Ozempic-       Type 2 diabetes mellitus with hyperglycemia, with long-term current use of insulin ([facility])  A1C: 6.5 % ( last A1C 6.5%)   Weight: 246 lb ( last weight: 246  lb )     Diabetes control is improving but needs ongoing management and evaluation  given the chronicity of Diabetes, ongoing medication monitoring and risk for complications     Discussed SGLT2 rx for T2DM and CKD benefit   but desires to investigate cost    On recent Dexcom , needs more basal insulin   Increase: Toujeo max 44  units once daily   Novolog flex pen: 14  units   w breakfast--> increase dinner to 12--> 14 units       Continue   Dexcom G7 (Medicare at [facility] )   Metformin 1000mg  twice daily    Ozempic 0.5mg subcutaneous once weekly (dosing on Ozempic 2.0mg pen for cost savings)     Reminded to store ozempic pen  refrigerated for all doses     Reviewed  clinical significance of A1c, adverse effects of suboptimal glucose control, and goals of therapy   Reviewed the A1C test, what the value reflects and the goal for the patient.   Reminded pt on A1C and blood sugar targets (Fasting < 130 and post prandial <180 ) and complications associated with hyperglycemia and uncontrolled DM (on AVS)   Recommended SMBG 2- x daily if not using CGM   Reviewed s/s and treatment of hypoglycemia (on AVS)   Cost barrier w Glucagon - pt has low alert on Dexcom at 75 mg/dl   Continue with lifestyle modifications since they have positive impact on diabetes/blood sugars/health (portion control, physical activity, weight loss)   Reinforced timing and adherence with medication, self-monitoring of blood glucose and routine follow up  In office appt w me -4 m  but recommended to contact DM clinic sooner if questions or concerns.    The patient indicates understanding of these issues and agrees to the plan.      Orders Placed This Encounter    metFORMIN HCl 1000 MG Oral Tab     Sig: Take 1 tablet (1,000 mg total) by mouth 2 (two) times daily with meals.     Dispense:  180 tablet     Refill:  1    Continuous Glucose Sensor (DEXCOM G7 SENSOR) Does not apply Misc     Si each Every 10 days.     [facility]    NOVOLOG FLEXPEN 100 UNIT/ML Subcutaneous Solution Pen-injector     Sig: TDD = 50 units  before meals 3 x daily as directed     Dispense:  45 mL     Refill:  0     DM quality  A1C/Blood pressure: as reported above   Nephropathy screenin   continue ace /arb rx.   LIPID screenin  . +  statin rx.   Last dilated eye exam: Last Dilated Eye Exam: 23   Exam shows retinopathy? Eye Exam shows Diabetic Retinopathy?: No  Last diabetic foot exam: Last Foot Exam: 24

## 2024-05-06 NOTE — PATIENT INSTRUCTIONS
Your A1C: 6.5%   This is still good  for you and I am happy to  work together with you with improving your health.   The A1C:  The A1C test provides us with your average blood sugar for the past 3 months. Keeping an A1C less than 7% helps reduce or delay health problems that are related to diabetes.   The main goal of diabetes treatment is to keep your sugar from going too high to prevent or delay complications from the disease as well as maintain your quality of life.   The target for A1C is less than 7.0%  but sometimes we make exceptions based on age, health history and other factors.     It is important to take all of your medications as prescribed. Please call me if you cannot get the prescriptions filled or are having issues with refills.   Please call me if you have any issues with medication questions, side effects, dosing questions or problems with your blood sugar trends BEFORE CHANGING OR STOPPING ANY MEDICATIONS.   MEDICATIONS:   Changes today - I would recommend starting Farxiga   Check on cost:    Farxiga (dapagliflozin) 10mg once daily  (90 tabs = 90 day supply)   FARXIGA  This medication will remove extra sugar out of blood into your urine.   This medication will not only help improve your blood sugars but have also shown to help protect the kidneys and heart.       It can be dosed anytime of day but morning is probably best time to take it due to increase in urination effect.     Please drink at least 4 glasses of water daily to avoid dehydration       It does not cause low blood sugars (hypoglycemia) . If you develop any low blood sugars, we will need to adjust other medications    Please call me if you develop any symptoms of urinary tract infection (burning with urination) or yeast infection (new rash itching or burning in the genital area)      If you are ever sick and not keeping fluids down, please hold the  Farxiga  until you are tolerating fluids again     Also when taking these  medications avoid following a  very-low-carbohydrate or ketogenic diet  as this  could increase the production of ketones, particularly during times of illness, dehydration, and/or metabolic stress, leading to diabetes ketoacidosis     INCREASE   Pedro Pablo scott star: 44 units once daily in AM   Novolog flex pen: 14 units Breakfast, 14 units dinner    Continue:   Metformin 1000mg twice daily   Ozempic 0.5mg subcutaneous once weekly (dosing on 2mg pen)         Blood sugar targets:  Before breakfast:   (preferably less than 110)  2 hours After meals: less than 180 (preferably less than 150)   Please call me if you are having blood sugars less than 75 or more than 200 more than 2 days in a week time span.     Hypoglycemia:    Watch for low blood sugars: (less than 70)  Symptoms of low blood sugar:   Shakiness or dizziness  Cold, clammy skin or sweating  Feeling hungry  Headache  Nervousness  A hard, fast heartbeat  Weakness  Confusion or irritability  Blurred eyesight  Having nightmares or waking up confused or sweating  Numbness or tingling in the lips or tongue     Treatment of Low Blood sugar Action Plan  1. Check blood glucose to be sure that it is low. You can’t always go by symptoms. If in doubt, treat your low blood glucose anyway.  2. Take 15 grams of carbohydrate (carb). Here are some choices:  4 oz. regular fruit juice  3-4 glucose tablets  6 oz. regular soda   7-8 jelly beans  3. Recheck blood glucose after 10-15 minutes. If blood glucose is still low (less than 70 mg/dl) repeat the treatment (step 2).  4. If your next meal is more than one hour away, eat a small snack.  5. If you’re not sure what caused your low blood glucose, call us .  6. Always check your blood glucose before you drive         Diabetes health monitoring      1. LABS: It is important to monitor your kidney function (blood and urine protein levels) , liver function tests and cholesterol levels when you have diabetes. If your primary  care provider has not ordered these tests, I will order them for you.   2. FOOT exams:  daily foot inspections for foot wounds or skin changes are important for foot care. Any unusual changes should be reported immediately.  3. EYE exams: Checking your eyes for diabetes changes is important and you should have a dilated eye exam done by an eye doctor EVERY year since these changes occur in the BACK of the eye and not visible by you.     Office protocols:   My Chart Messages - Our goal is to respond to all My Chart messages within 2 business days of receipt. Messages received after 4 pm on Friday, will be addressed on Monday, except if holiday.   Please DO NOT send urgent messages through My Chart as these messages are not monitored after hours.       Patient Calls -We make every attempt to answer all patient calls within 1 business day. Calls that come in after 4pm daily will be addressed the following business day. Nurses are available to answer your calls Monday - Friday from 8am - 4 pm except for holidays.      Refill policies:     Refills may be delayed if you have not had a recent office visit or recent labs as requested by your prescriber.       Our goal is to process your refill within 3  business day of receiving the refill request.   Contact your pharmacy at least 5 days prior to running out of medication and have them send an electronic request or submit a refill through My chart: select  “request refill” option in your [employer] account.  Refills are not addressed after hours or on weekends; covering providers  do not authorize routine medications on weekends.  Refills  received after 4pm on Fridays will be addressed on Mondays.    If  your prescription is due for a refill, and you are due for a follow up appointment., this will  may impact the ability for you to get a 90 supply if requested.   Yearly blood tests are  required for many medications to insure safe prescribing. If you are due for labs, you  will have 30 days to complete the  requested labs before future refills are authorized.   In the event that your preferred pharmacy does not have the requested medication in stock (e.g. Backordered), it is your responsibility to find another pharmacy that has the requested medication available.  We will gladly send a new prescription to that pharmacy at your request.  To best provide you care, patients receiving routine medications need to be seen at least twice per year. However if the A1C is above 8% you will be need to be seen more frequently as recommended by provider and this will also impact your ability get obtain refills          Thank you   Elisa Mata   [phone]

## 2024-05-09 ENCOUNTER — PATIENT MESSAGE (OUTPATIENT)
Dept: ENDOCRINOLOGY CLINIC | Facility: CLINIC | Age: 69
End: 2024-05-09

## 2024-05-09 RX ORDER — DAPAGLIFLOZIN 5 MG/1
5 TABLET, FILM COATED ORAL DAILY
Qty: 90 TABLET | Refills: 0 | Status: CANCELLED
Start: 2024-05-09 | End: 2024-08-07

## 2024-05-09 NOTE — TELEPHONE ENCOUNTER
From: Eric Rodriguez  To: Elisa Mata  Sent: 5/9/2024 11:43 AM CDT  Subject: Farxiga    Please issue a prescription for the Farxiga that we discussed last Monday. Order the brand name as the generic is not covered by my insurance. The 90 day supply is quite a bit less expensive than the 30 day supply so please put the order in for 90 day supply. Please send prescription to [facility] in [place] on [address].    Thank you

## 2024-05-10 ENCOUNTER — NURSE ONLY (OUTPATIENT)
Dept: ENDOCRINOLOGY CLINIC | Facility: CLINIC | Age: 69
End: 2024-05-10
Payer: MEDICARE

## 2024-05-10 ENCOUNTER — PATIENT MESSAGE (OUTPATIENT)
Dept: ENDOCRINOLOGY CLINIC | Facility: CLINIC | Age: 69
End: 2024-05-10

## 2024-05-10 DIAGNOSIS — Z79.4 TYPE 2 DIABETES MELLITUS WITH DIABETIC NEUROPATHY, WITH LONG-TERM CURRENT USE OF INSULIN (HCC): Primary | ICD-10-CM

## 2024-05-10 DIAGNOSIS — E11.40 TYPE 2 DIABETES MELLITUS WITH DIABETIC NEUROPATHY, WITH LONG-TERM CURRENT USE OF INSULIN (HCC): Primary | ICD-10-CM

## 2024-05-10 NOTE — TELEPHONE ENCOUNTER
Scheduled Nurse visit for Farxiga sample and co-pay card  Future Appointments   Date Time Provider Department Center   5/10/2024  9:15 AM [facility] NURSE [facility]

## 2024-05-12 RX ORDER — DAPAGLIFLOZIN 10 MG/1
10 TABLET, FILM COATED ORAL DAILY
Qty: 14 TABLET | Refills: 0 | COMMUNITY
Start: 2024-05-12

## 2024-05-15 ENCOUNTER — TELEPHONE (OUTPATIENT)
Dept: ENDOCRINOLOGY CLINIC | Facility: CLINIC | Age: 69
End: 2024-05-15

## 2024-05-15 NOTE — TELEPHONE ENCOUNTER
Recievd order form for patient G7 sensor through [facility] attached chart notes and placed on CB to review and sign

## 2024-05-22 ENCOUNTER — HOSPITAL ENCOUNTER (OUTPATIENT)
Dept: CT IMAGING | Facility: HOSPITAL | Age: 69
Discharge: HOME | End: 2024-05-22
Attending: INTERNAL MEDICINE

## 2024-05-22 VITALS — HEART RATE: 65 BPM | RESPIRATION RATE: 10 BRPM | DIASTOLIC BLOOD PRESSURE: 60 MMHG | SYSTOLIC BLOOD PRESSURE: 95 MMHG

## 2024-05-22 DIAGNOSIS — I35.9 AORTIC VALVE CALCIFICATION: ICD-10-CM

## 2024-05-22 DIAGNOSIS — I25.10 CAD IN NATIVE ARTERY: ICD-10-CM

## 2024-05-22 DIAGNOSIS — J44.9 COPD (CHRONIC OBSTRUCTIVE PULMONARY DISEASE) (HCC): ICD-10-CM

## 2024-05-22 PROCEDURE — 75574 CT ANGIO HRT W/3D IMAGE: CPT | Performed by: INTERNAL MEDICINE

## 2024-05-22 RX ORDER — METOPROLOL TARTRATE 1 MG/ML
INJECTION, SOLUTION INTRAVENOUS
Status: DISPENSED
Start: 2024-05-22 | End: 2024-05-22

## 2024-05-22 RX ORDER — NITROGLYCERIN 0.4 MG/1
TABLET SUBLINGUAL
Status: COMPLETED
Start: 2024-05-22 | End: 2024-05-22

## 2024-05-22 RX ORDER — DILTIAZEM HYDROCHLORIDE 5 MG/ML
INJECTION INTRAVENOUS
Status: DISCONTINUED
Start: 2024-05-22 | End: 2024-05-22 | Stop reason: WASHOUT

## 2024-05-22 NOTE — IMAGING NOTE
Eric Rodriguez to CT Rm 4 GE.   Denies use of long acting nitrates like, Imdur, Cialis, Levitra and Viagra.   O2 applied via nasal cannula @ 2LPM.  Procedure explained and questions answered.   GFR = 52    Contrast injection = 105ml  0.9 NS flush = 65ml  Average HR = 62    Patient tolerated the procedure without complication. Denies any contrast reaction. Denies lightheadedness or dizziness. Discontinued IV saline lock.   Escorted pt to [facility]'s Dressing Room and discharged in stable condition.

## 2024-05-22 NOTE — IMAGING NOTE
Radha Reyes in [facility] for CT gated coronary study. Procedure discussed and IV placed. Please see flow-sheets for vital signs and/ or additional information. SBAR given to Margi Walker RN

## 2024-05-24 ENCOUNTER — PATIENT MESSAGE (OUTPATIENT)
Dept: ENDOCRINOLOGY CLINIC | Facility: CLINIC | Age: 69
End: 2024-05-24

## 2024-05-24 DIAGNOSIS — E11.40 TYPE 2 DIABETES MELLITUS WITH DIABETIC NEUROPATHY, WITH LONG-TERM CURRENT USE OF INSULIN (HCC): ICD-10-CM

## 2024-05-24 DIAGNOSIS — Z79.4 TYPE 2 DIABETES MELLITUS WITH DIABETIC NEUROPATHY, WITH LONG-TERM CURRENT USE OF INSULIN (HCC): ICD-10-CM

## 2024-05-24 RX ORDER — DAPAGLIFLOZIN 10 MG/1
10 TABLET, FILM COATED ORAL DAILY
Qty: 30 TABLET | Refills: 0 | Status: SHIPPED | OUTPATIENT
Start: 2024-05-24

## 2024-05-24 RX ORDER — DAPAGLIFLOZIN 10 MG/1
10 TABLET, FILM COATED ORAL DAILY
Qty: 90 TABLET | Refills: 1 | Status: SHIPPED | OUTPATIENT
Start: 2024-05-24

## 2024-05-26 NOTE — TELEPHONE ENCOUNTER
"Pleasantly disorganized. Brightens upon approach and rates mood as \"good\". Reports \"good\" sleep last night but as per nursing report, patient did not sleep. Patient actively visible on unit walking hallways. Continues to ask staff personal questions, inquire about off-duty staff, and requires redirection. Interacts positively with peers. Denies SI/HI/AVH and encouraged to approach staff if ideations occur. Plan of care ongoing. Denies unmet needs.    " LOV 12/21/2016

## 2024-05-31 NOTE — TELEPHONE ENCOUNTER
Received fax from [address] [place] [address] fax via [facility] for patient OV notes [phone] Tim from [employer] is calling in regards to this patient because they need a PA for the sildenafil. Tim stated you can either do a verbal PA over the phone or you can do a PA through cover my meds or by fax. Fax number is [phone] reference number is [id number]. Please advise.

## 2024-06-11 DIAGNOSIS — I10 ESSENTIAL HYPERTENSION: ICD-10-CM

## 2024-06-11 RX ORDER — OLMESARTAN MEDOXOMIL 40 MG/1
TABLET ORAL
Qty: 90 TABLET | Refills: 1 | Status: SHIPPED | OUTPATIENT
Start: 2024-06-11

## 2024-06-11 RX ORDER — HYDROCHLOROTHIAZIDE 25 MG/1
TABLET ORAL
Qty: 90 TABLET | Refills: 1 | Status: SHIPPED | OUTPATIENT
Start: 2024-06-11

## 2024-06-11 NOTE — TELEPHONE ENCOUNTER
Last office visit: 04/29/24  Protocol: pass    Requested medication(s) are due for refill today: Yes    Requested medication(s) are on the active medication list same strength, form, dose/ sig: Yes    Requested medication(s) are managed by provider: Yes    Patient has already received a courtsey refill: No    NOV: 7/29/24  Last Labs: 04/23/24  Asked to Return: 7/29/24

## 2024-06-25 ENCOUNTER — TELEPHONE (OUTPATIENT)
Dept: ENDOCRINOLOGY CLINIC | Facility: CLINIC | Age: 69
End: 2024-06-25

## 2024-06-25 NOTE — TELEPHONE ENCOUNTER
Received PA for Dapagliflozin called [facility] [facility] LVM for them to run under brand name and not generic.     LVM with clinic office number as well if any questions on order not going through
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
## 2024-07-08 ENCOUNTER — OFFICE VISIT (OUTPATIENT)
Dept: SURGERY | Facility: CLINIC | Age: 69
End: 2024-07-08
Payer: MEDICARE

## 2024-07-08 DIAGNOSIS — N39.0 RECURRENT UTI: ICD-10-CM

## 2024-07-08 DIAGNOSIS — R82.90 URINE FINDING: Primary | ICD-10-CM

## 2024-07-08 DIAGNOSIS — N13.30 BILATERAL HYDRONEPHROSIS: ICD-10-CM

## 2024-07-08 DIAGNOSIS — N31.9 HYPOTONIC NEUROGENIC BLADDER: ICD-10-CM

## 2024-07-08 DIAGNOSIS — R97.20 ELEVATED PSA: ICD-10-CM

## 2024-07-08 DIAGNOSIS — R33.9 URINARY RETENTION: ICD-10-CM

## 2024-07-08 DIAGNOSIS — N40.1 BPH WITH OBSTRUCTION/LOWER URINARY TRACT SYMPTOMS: ICD-10-CM

## 2024-07-08 DIAGNOSIS — N13.8 BPH WITH OBSTRUCTION/LOWER URINARY TRACT SYMPTOMS: ICD-10-CM

## 2024-07-08 PROCEDURE — 99214 OFFICE O/P EST MOD 30 MIN: CPT | Performed by: UROLOGY

## 2024-07-08 PROCEDURE — 81003 URINALYSIS AUTO W/O SCOPE: CPT | Performed by: UROLOGY

## 2024-07-08 PROCEDURE — 51798 US URINE CAPACITY MEASURE: CPT | Performed by: UROLOGY

## 2024-07-08 RX ORDER — FINASTERIDE 5 MG/1
5 TABLET, FILM COATED ORAL DAILY
Qty: 90 TABLET | Refills: 6 | Status: SHIPPED | OUTPATIENT
Start: 2024-07-08

## 2024-07-08 RX ORDER — DAPAGLIFLOZIN 10 MG/1
TABLET, FILM COATED ORAL DAILY
COMMUNITY

## 2024-07-08 RX ORDER — TAMSULOSIN HYDROCHLORIDE 0.4 MG/1
0.4 CAPSULE ORAL DAILY
Qty: 90 CAPSULE | Refills: 5 | Status: SHIPPED | OUTPATIENT
Start: 2024-07-08

## 2024-07-25 ENCOUNTER — LAB ENCOUNTER (OUTPATIENT)
Dept: LAB | Age: 69
End: 2024-07-25
Attending: FAMILY MEDICINE
Payer: MEDICARE

## 2024-07-25 DIAGNOSIS — Z79.4 TYPE 2 DIABETES MELLITUS WITH DIABETIC NEUROPATHY, WITH LONG-TERM CURRENT USE OF INSULIN (HCC): ICD-10-CM

## 2024-07-25 DIAGNOSIS — Z87.39 HISTORY OF GOUT: ICD-10-CM

## 2024-07-25 DIAGNOSIS — E11.40 TYPE 2 DIABETES MELLITUS WITH DIABETIC NEUROPATHY, WITH LONG-TERM CURRENT USE OF INSULIN (HCC): ICD-10-CM

## 2024-07-25 DIAGNOSIS — E78.5 DYSLIPIDEMIA: ICD-10-CM

## 2024-07-25 LAB
ALBUMIN SERPL-MCNC: 4.6 G/DL (ref 3.2–4.8)
ALBUMIN/GLOB SERPL: 1.5 {RATIO} (ref 1–2)
ALP LIVER SERPL-CCNC: 95 U/L
ALT SERPL-CCNC: 19 U/L
ANION GAP SERPL CALC-SCNC: 6 MMOL/L (ref 0–18)
AST SERPL-CCNC: 15 U/L (ref ?–34)
BILIRUB SERPL-MCNC: 0.5 MG/DL (ref 0.2–1.1)
BUN BLD-MCNC: 20 MG/DL (ref 9–23)
CALCIUM BLD-MCNC: 9.6 MG/DL (ref 8.7–10.4)
CHLORIDE SERPL-SCNC: 103 MMOL/L (ref 98–112)
CHOLEST SERPL-MCNC: 150 MG/DL (ref ?–200)
CO2 SERPL-SCNC: 24 MMOL/L (ref 21–32)
CREAT BLD-MCNC: 1.37 MG/DL
EGFRCR SERPLBLD CKD-EPI 2021: 55 ML/MIN/1.73M2 (ref 60–?)
EST. AVERAGE GLUCOSE BLD GHB EST-MCNC: 123 MG/DL (ref 68–126)
FASTING PATIENT LIPID ANSWER: YES
FASTING STATUS PATIENT QL REPORTED: YES
GLOBULIN PLAS-MCNC: 3 G/DL (ref 2.8–4.4)
GLUCOSE BLD-MCNC: 171 MG/DL (ref 70–99)
HBA1C MFR BLD: 5.9 % (ref ?–5.7)
HDLC SERPL-MCNC: 44 MG/DL (ref 40–59)
LDLC SERPL CALC-MCNC: 83 MG/DL (ref ?–100)
NONHDLC SERPL-MCNC: 106 MG/DL (ref ?–130)
OSMOLALITY SERPL CALC.SUM OF ELEC: 283 MOSM/KG (ref 275–295)
POTASSIUM SERPL-SCNC: 4.4 MMOL/L (ref 3.5–5.1)
PROT SERPL-MCNC: 7.6 G/DL (ref 5.7–8.2)
SODIUM SERPL-SCNC: 133 MMOL/L (ref 136–145)
TRIGL SERPL-MCNC: 128 MG/DL (ref 30–149)
URATE SERPL-MCNC: 9.4 MG/DL
VLDLC SERPL CALC-MCNC: 20 MG/DL (ref 0–30)

## 2024-07-25 PROCEDURE — 80061 LIPID PANEL: CPT

## 2024-07-25 PROCEDURE — 84550 ASSAY OF BLOOD/URIC ACID: CPT

## 2024-07-25 PROCEDURE — 83036 HEMOGLOBIN GLYCOSYLATED A1C: CPT

## 2024-07-25 PROCEDURE — 80053 COMPREHEN METABOLIC PANEL: CPT

## 2024-07-29 ENCOUNTER — OFFICE VISIT (OUTPATIENT)
Dept: FAMILY MEDICINE CLINIC | Facility: CLINIC | Age: 69
End: 2024-07-29
Payer: MEDICARE

## 2024-07-29 ENCOUNTER — TELEPHONE (OUTPATIENT)
Dept: FAMILY MEDICINE CLINIC | Facility: CLINIC | Age: 69
End: 2024-07-29

## 2024-07-29 VITALS
SYSTOLIC BLOOD PRESSURE: 110 MMHG | OXYGEN SATURATION: 97 % | HEIGHT: 70 IN | RESPIRATION RATE: 18 BRPM | BODY MASS INDEX: 33.93 KG/M2 | WEIGHT: 237 LBS | HEART RATE: 87 BPM | DIASTOLIC BLOOD PRESSURE: 60 MMHG

## 2024-07-29 DIAGNOSIS — K80.20 GALLSTONES: ICD-10-CM

## 2024-07-29 DIAGNOSIS — D17.22 BENIGN LIPOMATOUS NEOPLASM OF SKIN AND SUBCUTANEOUS TISSUE OF LEFT ARM: ICD-10-CM

## 2024-07-29 DIAGNOSIS — G89.29 CHRONIC BILATERAL LOW BACK PAIN WITHOUT SCIATICA: ICD-10-CM

## 2024-07-29 DIAGNOSIS — R35.0 FREQUENCY OF MICTURITION: ICD-10-CM

## 2024-07-29 DIAGNOSIS — I35.0 MILD AORTIC STENOSIS: ICD-10-CM

## 2024-07-29 DIAGNOSIS — Z87.19 HISTORY OF HEMORRHOIDS: ICD-10-CM

## 2024-07-29 DIAGNOSIS — Z00.00 ENCOUNTER FOR ANNUAL HEALTH EXAMINATION: Primary | ICD-10-CM

## 2024-07-29 DIAGNOSIS — E11.40 TYPE 2 DIABETES MELLITUS WITH DIABETIC NEUROPATHY, WITH LONG-TERM CURRENT USE OF INSULIN (HCC): ICD-10-CM

## 2024-07-29 DIAGNOSIS — E78.5 DYSLIPIDEMIA: ICD-10-CM

## 2024-07-29 DIAGNOSIS — K57.30 DIVERTICULOSIS OF COLON: ICD-10-CM

## 2024-07-29 DIAGNOSIS — I35.9 AORTIC VALVE CALCIFICATION: ICD-10-CM

## 2024-07-29 DIAGNOSIS — Z79.4 TYPE 2 DIABETES MELLITUS WITH DIABETIC NEUROPATHY, WITH LONG-TERM CURRENT USE OF INSULIN (HCC): ICD-10-CM

## 2024-07-29 DIAGNOSIS — M54.50 CHRONIC BILATERAL LOW BACK PAIN WITHOUT SCIATICA: ICD-10-CM

## 2024-07-29 DIAGNOSIS — E55.9 VITAMIN D DEFICIENCY: ICD-10-CM

## 2024-07-29 DIAGNOSIS — M51.36 DDD (DEGENERATIVE DISC DISEASE), LUMBAR: ICD-10-CM

## 2024-07-29 DIAGNOSIS — K44.9 DIAPHRAGMATIC HERNIA WITHOUT OBSTRUCTION AND WITHOUT GANGRENE: ICD-10-CM

## 2024-07-29 DIAGNOSIS — Z87.39 HISTORY OF GOUT: ICD-10-CM

## 2024-07-29 DIAGNOSIS — Z90.79 S/P TURP (TRANSURETHRAL RESECTION OF PROSTATE): ICD-10-CM

## 2024-07-29 DIAGNOSIS — Z71.85 VACCINE COUNSELING: ICD-10-CM

## 2024-07-29 DIAGNOSIS — F17.200 CURRENT SMOKER: ICD-10-CM

## 2024-07-29 DIAGNOSIS — Z13.31 DEPRESSION SCREENING: ICD-10-CM

## 2024-07-29 DIAGNOSIS — I25.10 CORONARY ARTERY DISEASE INVOLVING NATIVE CORONARY ARTERY OF NATIVE HEART WITHOUT ANGINA PECTORIS: ICD-10-CM

## 2024-07-29 DIAGNOSIS — Z87.891 PERSONAL HISTORY OF NICOTINE DEPENDENCE: ICD-10-CM

## 2024-07-29 DIAGNOSIS — I10 ESSENTIAL HYPERTENSION: ICD-10-CM

## 2024-07-29 DIAGNOSIS — Z80.0 FAMILY HISTORY OF MALIGNANT NEOPLASM OF GASTROINTESTINAL TRACT: ICD-10-CM

## 2024-07-29 DIAGNOSIS — J44.9 CHRONIC OBSTRUCTIVE PULMONARY DISEASE, UNSPECIFIED COPD TYPE (HCC): ICD-10-CM

## 2024-07-29 DIAGNOSIS — Z79.899 MEDICATION MANAGEMENT: ICD-10-CM

## 2024-07-29 DIAGNOSIS — K22.719 BARRETT'S ESOPHAGUS WITH DYSPLASIA: ICD-10-CM

## 2024-07-29 DIAGNOSIS — I34.81 MITRAL VALVE ANNULAR CALCIFICATION: ICD-10-CM

## 2024-07-29 NOTE — PATIENT INSTRUCTIONS
Quitting Smoking    Quitting smoking is the most important step you can take to improve your health. We're glad you have set a goal to improve your health.    Quit Smoking Resources    In addition to medications, use the STAR plan to help you successfully quit.   Stick with your quit date!   Tell friends, family, and coworkers your quit date. Request their understanding and support.  Anticipate and prepare for challenges. Some examples are withdrawal symptoms, being around others who smoke, and drinking alcohol.  Remove all tobacco products and paraphernalia from your environment. Make your home and vehicles smoke-free.    Free resources for additional support:  National tobacco quitline: 1-800-QUIT-NOW (1-934.334.8087).  SmokefreeTXT is a free text program to assist you in quitting. Visit https://www.smokefree.gov/smokefreetxt for more information.  Feel free to call your care manager at ([phone]) for additional support.    Getting Support for Quitting Smoking  You don’t have to go through the process of quitting smoking without support. Tell people you are quitting. The support of friends, coworkers, and family members can make a big difference. Face-to-face or telephone counseling can also be helpful, as can a stop-smoking class or an ex-smokers’ group.     Set a quit date  If you’re serious about quitting smoking, choose a specific quit date within the next 2 to 4 weeks. Jaspal it in bright, bold letters on a calendar you use often. Tell people about your quit date. Ask for their support. Let your friends, coworkers, and family know how they can help you quit.   Make a contract  A quit-smoking contract gives you a goal. Write out the contract and sign it. Have it witnessed, if you like. Then keep the contract where you’ll see it often, or carry it with you. Read the contract when you’re tempted to smoke.   Take action  On the day you quit, reread your quit contract. Think about the benefits you gain by  quitting, such as better health and an improved sense of taste, as well as the money you will save from not smoking. Also:   Remove cigarettes from your home, car, or any other place where you stash them.  Throw away all smoking materials, including matches, lighters, and ashtrays.  Review your list of triggers and your plan for coping with each of them.  Stay away from people or settings you link with smoking.  Make a quit kit that includes gum, mints, carrot sticks, and things to keep your hands and mouth busy.  Talk to your healthcare provider about using quit-smoking products, such as medicine or a nicotine patch, inhaler, nasal spray, gum, or lozenges.  Ask for help  Sometimes you may just need to talk when you miss smoking. Ex-smokers are good to talk to, because they’re likely to know how you feel. You may need extra support in the first few weeks after you quit. Ask a friend to call you each day to see how you’re doing. Telephone counseling can also help you keep on track. Ask your healthcare provider, local hospital, or public health department to put you in touch with a phone counselor. You may also have to deal with doubters when you decide to quit. Explain to any doubters why you are quitting. Tell them that quitting is important to you. Ask for their support.   Tell your smoking buddies that you can walk together instead of smoking together. If someone thinks you won’t succeed, say that you have a good quit plan and ask for their support. Let them know you’re sticking with it. If they can't give you support, consider limiting contact with them for a while.   Stay positive, and if you slip, start again. Quitting smoking often requires repeated attempts. But smokers do quit for good. It's hard, but with determination and support, you can do it.   To learn more  Get more tips from these resources:  CDC at www.cdc.gov/tobacco/quit_smoking  or 800-QUIT-NOW (885-297-7702)  National Cancer [place] at  www.smokefree.gov  or 877-44U-QUIT (520-505-2447)  American Lung Association at www.lung.org/stop-smoking  or 800-LUNGUSA (258-727-6320)  [employer] last reviewed this educational content on 1/1/2022  © 1438-4861 [employer]. All rights reserved. This information is not intended as a substitute for professional medical care. Always follow your healthcare professional's instructions.

## 2024-07-29 NOTE — PROGRESS NOTES
Subjective:   Eric Rodriguez is a 69 year old male who presents for a Subsequent Annual Wellness visit (Pt already had Initial Annual Wellness) and scheduled follow up of multiple significant but stable problems and acute uncomplicated problem.   Pt. Complains of low back pain and leaning forward helps.  He notes it for the past few years. Sitting helps.    Patient has  been checking his FBS's at home regularly -- has not checked recently.  Last eye exam -- 7/24/23 and today.  Trujeo 40 units in the AM   Novolog -- 14 in AM and 10 at other meals.  On faxiga and ozempic.      Patient presents for recheck of his hypertension. Pt has been taking medications as instructed, no medication side effects, home BP monitoring in the range of 120's systolic and 70-80's diastolic.  His cholesterol is stable. He has been taking his Vytorin as ordered. He denies any side effects of the medications.  Lab work reviewed with the patient.  Also, he is a current every day smoker, he reports he smokes about 1 ppd.   He is aware he needs to quit  Counseling provided and smoking cessation advised. He will consider cessation but not now.  Had echo per Dr. Issa  -- done 4/18/24  Had CT angio on May 22.  Seeing Dr. Issa in 1 year.    History/Other:   Fall Risk Assessment:   He has been screened for Falls and is low risk.      Cognitive Assessment:   He had a completely normal cognitive assessment - see flowsheet entries       Functional Ability/Status:   Eric Rodriguez has some abnormal functions as listed below:  He has Vision problems based on screening of functional status.       Depression Screening (PHQ):  PHQ-2 SCORE: 0  , done 7/23/2024   If you checked off any problems, how difficult have these problems made it for you to do your work, take care of things at home, or get along with other people?: Not difficult at all         5 minutes spent screening and counseling for depression    Advanced Directives:   He has a  Living Will on file in Epic; reviewed and discussed documents with patient (and family/surrogate if present).  He has a Power of  for Health Care on file in [facility].  Patient has Advance Care Planning documents present in EMR. Reviewed documents with patient (and family/surrogate if present).      Patient Active Problem List   Diagnosis    Dyslipidemia    Essential hypertension    Vitamin D deficiency    Smoking    Chronic obstructive pulmonary disease (HCC)    History of gout    Type 2 diabetes mellitus with diabetic neuropathy (HCC)    Mild aortic stenosis    BPH with urinary obstruction    Aortic valve calcification    Mitral valve annular calcification    Elizondo's esophagus    Diaphragmatic hernia    Gastritis and gastroduodenitis    Family history of malignant neoplasm of gastrointestinal tract    Esophagitis    Diverticulosis of colon    Benign neoplasm of colon    Coronary artery disease involving native coronary artery of native heart without angina pectoris    History of hemorrhoids    Abnormal findings on diagnostic imaging of gallbladder    Abnormal finding on GI tract imaging    Cholecystoduodenal fistula    Morbid (severe) obesity due to excess calories ([facility])    Type 2 diabetes mellitus with hyperglycemia, with long-term current use of insulin ([facility])     Allergies:  He is allergic to dander, other, penicillins, and tetanus toxoids.    Current Medications:  Outpatient Medications Marked as Taking for the 7/29/24 encounter (Office Visit) with Jo Ann Wilson, DO   Medication Sig    dapagliflozin (FARXIGA) 10 MG Oral Tab Take by mouth daily.    finasteride 5 MG Oral Tab Take 1 tablet (5 mg total) by mouth daily.    tamsulosin 0.4 MG Oral Cap Take 1 capsule (0.4 mg total) by mouth daily. Take 1/2 hour following the same meal each day    hydroCHLOROthiazide 25 MG Oral Tab TAKE 1 TABLET BY MOUTH ONE TIME DAILY    Olmesartan Medoxomil 40 MG Oral Tab TAKE 1 TABLET BY MOUTH ONE TIME DAILY    dapagliflozin  (FARXIGA) 10 MG Oral Tab Take 1 tablet (10 mg total) by mouth daily.    metFORMIN HCl 1000 MG Oral Tab Take 1 tablet (1,000 mg total) by mouth 2 (two) times daily with meals.    Continuous Glucose Sensor (DEXCOM G7 SENSOR) Does not apply Misc 1 each Every 10 days.    NOVOLOG FLEXPEN 100 UNIT/ML Subcutaneous Solution Pen-injector TDD = 50 units  before meals 3 x daily as directed    metoprolol tartrate 100 MG Oral Tab     Insulin Glargine, 2 Unit Dial, (TOUJEO MAX SOLOSTAR) 300 UNIT/ML Subcutaneous Solution Pen-injector Use up to 65 units daily as directed    mometasone 0.1 % External Ointment Apply 1 Application topically in the morning and 1 Application before bedtime.    EZETIMIBE-SIMVASTATIN 10-40 MG Oral Tab TAKE ONE TABLET BY MOUTH EVERY OTHER DAY    omega-3-acid ethyl esters 1 g Oral Cap TAKE 2 CAPSULES BY MOUTH TWICE A DAY    semaglutide 8 MG/3ML Subcutaneous Solution Pen-injector Inject 2 mg into the skin once a week.    omeprazole 20 MG Oral Capsule Delayed Release Take 1 capsule (20 mg total) by mouth every morning before breakfast.    Fluticasone Propionate 50 MCG/ACT Nasal Suspension 2 sprays by Each Nare route daily as needed for Rhinitis.    Glucose Blood (CONTOUR NEXT TEST) In Vitro Strip 1 each by Other route 4 (four) times daily with meals and nightly.    Tiotropium Bromide Monohydrate (SPIRIVA HANDIHALER) 18 MCG Inhalation Cap Inhale 1 capsule (18 mcg total) into the lungs daily.    Insulin Pen Needle (BD PEN NEEDLE SHORT U/F) 31G X 8 MM Does not apply Misc Please disregard previous qty on this. Pt uses up to 4x a day. Pt uses 360 a month~    Niacin  MG Oral Tab CR Take 1 tablet (500 mg total) by mouth 2 (two) times daily with meals.    Multiple Vitamins-Minerals (CENTRUM SILVER OR) Take by mouth.         Medical History:  He  has a past medical history of Elizondo's esophagus, Blood in urine (04/2021), COPD (chronic obstructive pulmonary disease) (HCC), Diabetes mellitus (HCC) (15-20 years  ago), Disorder of prostate (Enlarged for 15 to 20 years ago), Esophageal reflux, Frequent urination, Hemorrhoids (2.5 years ago), Hiatal hernia, High cholesterol, Painful urination, Sleep apnea (Noticed by wife for a few years), Unspecified essential hypertension, Unspecified gastritis and gastroduodenitis without mention of hemorrhage, and Weight loss (1 year due to diet and exercise).  Surgical History:  He  has a past surgical history that includes colonoscopy (03/30/2007); dental surgery procedure (03/01/2011); colonoscopy (2013); egd (8/2/2019); other surgical history (07/07/2008); other surgical history (06/2010); other surgical history (01/2010); other surgical history (2013); other surgical history (N/A, 7/2/2015); and other (06/03/2021).   Family History:  His family history includes Amputation in his brother; CHF in his mother; Cancer in his sister, sister, and sister; Diabetes in his brother, brother, father, and mother; Diabetic retinopathy in his brother; Other in his father; Stroke in his father.  Social History:  He  reports that he has been smoking cigarettes. He has a 30 pack-year smoking history. He has never used smokeless tobacco. He reports that he does not currently use alcohol. He reports that he does not use drugs.    Tobacco:  Social History     Tobacco Use   Smoking Status Every Day    Current packs/day: 1.00    Average packs/day: 1 pack/day for 30.0 years (30.0 ttl pk-yrs)    Types: Cigarettes   Smokeless Tobacco Never   Tobacco Comments    1.0 pack weekends, only 4 cigs QD week days     E-Cigarettes/Vaping       Questions Responses    E-Cigarette Use Never User           Tobacco cessation counseling for 3-10 minutes (add E/M code #97881).      CAGE Alcohol Screen:   CAGE screening score of 0 on 7/23/2024, showing low risk of alcohol abuse.      Patient Care Team:  Jo Ann Wilson DO as PCP - General (Family Practice)  Shakeel Capps NP (Nurse Practitioner)  Crescencio Gil MD  (GASTROENTEROLOGY)  Margie La RN, [facility] [place] as Registered Nurse (Registered Nurse)  Cesar Hutchinson MD (UROLOGY)  Elisa Mata APRN (Nurse Practitioner)    Review of Systems  GENERAL: feels well otherwise  SKIN: denies any unusual skin lesions  EYES: denies blurred vision or double vision  HEENT: denies nasal congestion, sinus pain or ST  LUNGS: denies shortness of breath with exertion  CARDIOVASCULAR: denies chest pain on exertion  GI: denies abdominal pain, denies heartburn  : 2 per night nocturia, no complaint of urinary incontinence  MUSCULOSKELETAL:  back pain  NEURO: denies headaches  PSYCHE: denies depression or anxiety  HEMATOLOGIC: denies hx of anemia  ENDOCRINE: denies thyroid history  ALL/ASTHMA: denies hx of allergy or asthma    Objective:   Physical Exam  General Appearance:  Alert, cooperative, no distress, appears stated age   Head:  Normocephalic, without obvious abnormality, atraumatic   Eyes:  Conjunctiva/corneas clear, EOM's intact, both eyes   Ears:  Normal TM's and external ear canals, both ears   Nose: Nares normal, septum midline, mucosa normal, no drainage or sinus tenderness   Throat: Lips, mucosa, and tongue normal; teeth and gums normal   Neck: Supple, symmetrical, trachea midline, no adenopathy, thyroid: not enlarged, symmetric, no tenderness/mass/nodules, no carotid bruit or JVD   Back:   Symmetric, no curvature, ROM normal, no CVA tenderness   Lungs:   Clear to auscultation bilaterally, respirations unlabored   Chest Wall:  No tenderness or deformity   Heart:  Regular rate and rhythm, S1, S2 normal, no murmur, rub or gallop   Abdomen:   Soft, non-tender, bowel sounds active all four quadrants,  no masses, no organomegaly   Genitalia:  Kristy GARSIA RN present; Normal male   Rectal: DEFERRED   Extremities: Extremities normal, atraumatic, no cyanosis or edema   Pulses: 2+ and symmetric   Skin: Skin color, texture, turgor normal, no rashes or lesions   Lymph nodes: Cervical,  supraclavicular nodes normal   Neurologic: Normal     /60 (BP Location: Left arm, Patient Position: Sitting, Cuff Size: adult)   Pulse 87   Resp 18   Ht 5' 10\" (1.778 m)   Wt 237 lb (107.5 kg)   SpO2 97%   BMI 34.01 kg/m²  Estimated body mass index is 34.01 kg/m² as calculated from the following:    Height as of this encounter: 5' 10\" (1.778 m).    Weight as of this encounter: 237 lb (107.5 kg).    Medicare Hearing Assessment:   Hearing Screening    Time taken: 7/29/2024  7:59 AM  Entry User: Catalina Ruff MA  Screening Method: Finger Rub  Finger Rub Result: Pass             Assessment & Plan:   Eric Rodriguez is a 69 year old male who presents for a Medicare Assessment.     1. Encounter for annual health examination (Primary)  2. Depression screening  -     Depression Screening (Medicare, Annual) []  -     Detailed, Mod Complex (92890)  3. Type 2 diabetes mellitus with diabetic neuropathy, with long-term current use of insulin (HCC)  -     Comp Metabolic Panel (14); Future; Expected date: 10/29/2024  -     Hemoglobin A1C; Future; Expected date: 10/29/2024  -     Detailed, Mod Complex (26267)  4. Essential hypertension  -     Comp Metabolic Panel (14); Future; Expected date: 10/29/2024  -     Hemoglobin A1C; Future; Expected date: 10/29/2024  -     Detailed, Mod Complex (75544)  5. Coronary artery disease involving native coronary artery of native heart without angina pectoris  -     Detailed, Mod Complex (99568)  6. Dyslipidemia  -     Lipid Panel; Future; Expected date: 10/29/2024  -     Detailed, Mod Complex (06190)  7. Current smoker  -     Tobacco Use Counseling 3-10 Min [80066]  -     Detailed, Mod Complex (62728)  8. Personal history of nicotine dependence  -     Tobacco Use Counseling 3-10 Min [39377]  -     Detailed, Mod Complex (28384)  9. Chronic obstructive pulmonary disease, unspecified COPD type (HCC)  -     Detailed, Mod Complex (23360)  10. Elizondo's esophagus with  dysplasia  Overview:  removed scar tissue - 2010 (donis)  Orders:  -     Detailed, Mod Complex (99214)  11. Aortic valve calcification  -     Detailed, Mod Complex (99214)  12. Mitral valve annular calcification  -     Detailed, Mod Complex (99214)  13. Mild aortic stenosis  -     Detailed, Mod Complex (99214)  14. History of gout  -     Uric Acid; Future; Expected date: 10/29/2024  -     Detailed, Mod Complex (68616)  15. Diaphragmatic hernia without obstruction and without gangrene  -     Detailed, Mod Complex (99214)  16. S/P TURP (transurethral resection of prostate)  -     PSA, Total W Reflex To Free; Future; Expected date: 10/29/2024  -     Detailed, Mod Complex (99214)  17. Vitamin D deficiency  -     Detailed, Mod Complex (99214)  18. History of hemorrhoids  -     Detailed, Mod Complex (99214)  19. Frequency of micturition  -     PSA, Total W Reflex To Free; Future; Expected date: 10/29/2024  -     Detailed, Mod Complex (46397)  20. Gallstones  -     Detailed, Mod Complex (55643)  21. Benign lipomatous neoplasm of skin and subcutaneous tissue of left arm  -     Detailed, Mod Complex (38702)  22. Family history of malignant neoplasm of gastrointestinal tract  -     Detailed, Mod Complex (51866)  23. Diverticulosis of colon  -     Detailed, Mod Complex (14628)  24. Medication management  -     Detailed, Mod Complex (23597)  25. Vaccine counseling  -     Detailed, Mod Complex (40864)  26. Body mass index (BMI) of 34.0-34.9 in adult  -     Detailed, Mod Complex (89847)  -     Face to Face Behavioral counseling on Obesity (BMI>30)  27. Chronic bilateral low back pain without sciatica  -     Detailed, Mod Complex (04985)  -     Physical Therapy Referral - [facility]  -     XR LUMBAR SPINE (MIN 4 VIEWS) (CPT=72110); Future; Expected date: 07/29/2024  28. DDD (degenerative disc disease), lumbar  -     Detailed, Mod Complex (45782)  -     Physical Therapy Referral - [facility]  -     XR LUMBAR SPINE (MIN  4 VIEWS) (CPT=72110); Future; Expected date: 07/29/2024    The patient indicates understanding of these issues and agrees to the plan.  Reinforced healthy diet, lifestyle, and exercise.      1. Encounter for annual health examination  Done today.    2. Depression screening  Done today.  - Depression Screening (Medicare, Annual) []  - Detailed, Mod Complex (69200)    3. Type 2 diabetes mellitus with diabetic neuropathy, with long-term current use of insulin (HCC)  Stable; CPM  - Comp Metabolic Panel (14); Future  - Hemoglobin A1C; Future  - Detailed, Mod Complex (70200)    4. Essential hypertension  Stable; CPM  - Comp Metabolic Panel (14); Future  - Hemoglobin A1C; Future  - Detailed, Mod Complex (05187)    5. Coronary artery disease involving native coronary artery of native heart without angina pectoris  Stable; CPM  - Detailed, Mod Complex (86828)    6. Dyslipidemia  Stable; CPM  - Lipid Panel; Future  - Detailed, Mod Complex (33401)    7. Current smoker  Stable; CPM  - Tobacco Use Counseling 3-10 Min [04689]  - Detailed, Mod Complex (15703)    8. Personal history of nicotine dependence  Stable; CPM  - Tobacco Use Counseling 3-10 Min [38152]  - Detailed, Mod Complex (75593)    9. Chronic obstructive pulmonary disease, unspecified COPD type (HCC)  Stable; CPM  - Detailed, Mod Complex (81591)    10. Elizondo's esophagus with dysplasia  Stable; CPM  - Detailed, Mod Complex (53842)    11. Aortic valve calcification  Stable; CPM  - Detailed, Mod Complex (24934)    12. Mitral valve annular calcification  Stable; CPM  - Detailed, Mod Complex (54546)    13. Mild aortic stenosis  Stable; CPM  - Detailed, Mod Complex (25713)    14. History of gout  Stable; CPM  - Uric Acid; Future  - Detailed, Mod Complex (88847)    15. Diaphragmatic hernia without obstruction and without gangrene  Stable; CPM  - Detailed, Mod Complex (07476)    16. S/P TURP (transurethral resection of prostate)  Stable; CPM  - PSA, Total W Reflex To  Free; Future  - Detailed, Mod Complex (99214)    17. Vitamin D deficiency  Stable; CPM  - Detailed, Mod Complex (73201)    18. History of hemorrhoids  Stable; CPM  - Detailed, Mod Complex (30571)    19. Frequency of micturition  Stable; CPM  - PSA, Total W Reflex To Free; Future  - Detailed, Mod Complex (00716)    20. Gallstones  Stable; CPM  - Detailed, Mod Complex (62678)    21. Benign lipomatous neoplasm of skin and subcutaneous tissue of left arm  Stable; CPM  - Detailed, Mod Complex (00112)    22. Family history of malignant neoplasm of gastrointestinal tract  Stable; CPM  - Detailed, Mod Complex (83772)    23. Diverticulosis of colon  Stable; CPM  - Detailed, Mod Complex (43926)    24. Medication management  Reviewed.  - Detailed, Mod Complex (05112)    25. Vaccine counseling  Reviewed.  - Detailed, Mod Complex (99214)    26. Body mass index (BMI) of 34.0-34.9 in adult  Focus on diet and exercise.  - Detailed, Mod Complex (99214)  - Face to Face Behavioral counseling on Obesity (BMI>30)    27. Chronic bilateral low back pain without sciatica  Advised PT.  - Detailed, Mod Complex (34856)  - Physical Therapy Referral - [facility]  - XR LUMBAR SPINE (MIN 4 VIEWS) (CPT=72110); Future    28. DDD (degenerative disc disease), lumbar  Advised PT.  - Detailed, Mod Complex (99214)  - Physical Therapy Referral - [facility]  - XR LUMBAR SPINE (MIN 4 VIEWS) (CPT=72110); Future        Return in about 3 months (around 10/29/2024) for med check, weight check.     Jo Ann Wilson DO, 7/29/2024     Supplementary Documentation:   General Health:  In the past six months, have you lost more than 10 pounds without trying?: 2 - No  Has your appetite been poor?: Yes  Type of Diet: Balanced  How does the patient maintain a good energy level?: Appropriate Exercise  How would you describe your daily physical activity?: Moderate  How would you describe your current health state?: Fair  How do you maintain positive mental  well-being?: Social Interaction;Games;Visiting Friends;Visiting Family  On a scale of 0 to 10, with 0 being no pain and 10 being severe pain, what is your pain level?: 0 - (None)  In the past six months, have you experienced urine leakage?: 0-No  At any time do you feel concerned for the safety/well-being of yourself and/or your children, in your home or elsewhere?: No  Have you had any immunizations at another office such as Influenza, Hepatitis B, Tetanus, or Pneumococcal?: Yes    Health Maintenance   Topic Date Due    Tobacco Cessation Counseling  01/01/2024    COVID-19 Vaccine (8 - 2023-24 season) 02/06/2024    Annual Physical  07/11/2024    Diabetes Care Dilated Eye Exam  07/25/2024    Colorectal Cancer Screening  08/02/2024    Influenza Vaccine (1) 10/01/2024    Lung Cancer Screening  01/15/2025    Diabetes Care A1C  01/25/2025    Diabetes Care: Microalb/Creat Ratio  04/23/2025    Diabetes Care Foot Exam  04/29/2025    Diabetes Care: GFR  07/25/2025    PSA  04/23/2026    Annual Depression Screening  Completed    Fall Risk Screening (Annual)  Completed    Pneumococcal Vaccine: 65+ Years  Completed    Zoster Vaccines  Completed       Tobacco cessation counseling for 3-10 minutes (add E/M code #74303).  Eric Rodriguez is a 69 year old male with a Body mass index is 34.01 kg/m².   HPI:   Eric Rodriguez was screened and found to have a BMI => 30, and is alert and competent for counseling on weight loss.    I performed a dietary (nutritional) assessment and Intensive behavioral counseling and behavioral therapy to promote sustained weight loss through high intensity interventions on diet and exercise.     ASSESSMENT AND PLAN:   Based on the 5A’s approach adopted by the [facility], the following plan is arranged:    Assess: We asked about factors affecting choice of behavior and assessed behavioral health risk of obesity.    Advise: We discussed clear and specific personalized plan for behavioral change including  discussion about personal harm from his obesity and benefits of his losing weight.    Agree: Through a collaborative effort, we selected treatment goals of 10 lbs based on Eric Rodriguez's interest and willingness to change.    Assist: We used behavior change techniques  Including self-help and counseling to aid in Eric Rodriguez achieving these agreed-upon goals by acquiring the skills, confidence, and social or environmental supports for behavior change, and we discussed possible supplementation with medical treatments.    Arrange: We discussed follow up for ongoing support in weight loss to support and adjust this treatment plan. We also discussed ability to go to the [facility] program for more intensive treatment.     Time Counseled: 15 min    Jo Ann Wilson, DO

## 2024-08-08 ENCOUNTER — PATIENT MESSAGE (OUTPATIENT)
Dept: FAMILY MEDICINE CLINIC | Facility: CLINIC | Age: 69
End: 2024-08-08

## 2024-08-08 NOTE — TELEPHONE ENCOUNTER
From: Eric Rodriguez  To: Jo Ann Tysonelysia  Sent: 8/8/2024 9:33 AM CDT  Subject: Eye Exam records    Attached is the after visit summary for my recent eye exam. If you did not recieve from Dr. LU Lux's office please place in my file.    Thank you

## 2024-08-09 ENCOUNTER — HOSPITAL ENCOUNTER (OUTPATIENT)
Dept: GENERAL RADIOLOGY | Age: 69
Discharge: HOME | End: 2024-08-09
Attending: FAMILY MEDICINE
Payer: MEDICARE

## 2024-08-09 DIAGNOSIS — M54.50 CHRONIC BILATERAL LOW BACK PAIN WITHOUT SCIATICA: ICD-10-CM

## 2024-08-09 DIAGNOSIS — G89.29 CHRONIC BILATERAL LOW BACK PAIN WITHOUT SCIATICA: ICD-10-CM

## 2024-08-09 DIAGNOSIS — M51.36 DDD (DEGENERATIVE DISC DISEASE), LUMBAR: ICD-10-CM

## 2024-08-09 PROCEDURE — 72110 X-RAY EXAM L-2 SPINE 4/>VWS: CPT | Performed by: FAMILY MEDICINE

## 2024-08-09 NOTE — PROGRESS NOTES
Dear Eric,    Your xray shows arthritis -- please proceed with PT as we discussed.    Sincerely,  Dr. Wilson

## 2024-09-09 ENCOUNTER — OFFICE VISIT (OUTPATIENT)
Dept: PHYSICAL THERAPY | Facility: HOSPITAL | Age: 69
End: 2024-09-09
Attending: FAMILY MEDICINE
Payer: MEDICARE

## 2024-09-09 DIAGNOSIS — M51.36 DDD (DEGENERATIVE DISC DISEASE), LUMBAR: ICD-10-CM

## 2024-09-09 DIAGNOSIS — M54.50 CHRONIC BILATERAL LOW BACK PAIN WITHOUT SCIATICA: Primary | ICD-10-CM

## 2024-09-09 DIAGNOSIS — G89.29 CHRONIC BILATERAL LOW BACK PAIN WITHOUT SCIATICA: Primary | ICD-10-CM

## 2024-09-09 PROCEDURE — 97110 THERAPEUTIC EXERCISES: CPT

## 2024-09-09 PROCEDURE — 97162 PT EVAL MOD COMPLEX 30 MIN: CPT

## 2024-09-09 NOTE — PROGRESS NOTES
SPINE EVALUATION:     Diagnosis:   Lower back pain      Referring Provider: Jo Ann Wilson  Date of Evaluation:    9/9/2024    Precautions:  None Next MD visit:   none scheduled  Date of Surgery: n/a     PATIENT SUMMARY   Eric Rodriguez is a 69 year old male who presents to therapy today with complaints of lower back pain. Pain has been present for several years. Reports pain occurs when he walks or stand for prolonged periods. Stading or walking > 20 min causes pain. If he sit or leans on something, pain decreases quickly. Walks on treadmill for about 10-15 min and uses UE support to prevent back pain. Carrying heavy items also provokes pain. Pain in middle of lower back. States lower back is not TTP. Does not radiate into LEs. Denies N/T.  Pt describes pain level current 0/10, at best 0/10, at worst 3/10.   Current functional limitations include walking or standing > 20 min.     Eric describes prior level of function w/o pain. Pt goals include pain reduction.  Past medical history was reviewed with Eric. Significant findings include   Type 2 diabetes mellitus with hyperglycemia, with long-term current use of insulin ([facility]ly [facility])   Morbid (severe) obesity due to excess calories (Formerly [facility])   Cholecystoduodenal fistula   Abnormal finding on GI tract imaging   Abnormal findings on diagnostic imaging of gallbladder   History of hemorrhoids   Coronary artery disease involving native coronary artery of native heart without angina pectoris   Elizondo's esophagus   Diaphragmatic hernia   Gastritis and gastroduodenitis   Family history of malignant neoplasm of gastrointestinal tract   Esophagitis   Diverticulosis of colon   Benign neoplasm of colon   Aortic valve calcification   Mitral valve annular calcification   Mild aortic stenosis   BPH with urinary obstruction   Type 2 diabetes mellitus with diabetic neuropathy (HCC)   History of gout   Chronic obstructive pulmonary disease (HCC)   Smoking   Vitamin D deficiency    Dyslipidemia   Essential hypertension     Pt denies diplopia, dysarthria, dysphasia, dizziness, drop attacks, bowel/bladder changes, saddle anesthesia, and NORA LE N/T.    ASSESSMENT  Eric presents to physical therapy evaluation with primary c/o lower back pain. The results of the objective tests and measures show decreased hip strength.  Functional deficits include but are not limited to pain with prolonged walking or standing.  Signs and symptoms are consistent with diagnosis of lumbar degenerative disc disease. Pt and PT discussed evaluation findings, pathology, POC and HEP.  Pt voiced understanding and performs HEP correctly without reported pain. Skilled Physical Therapy is medically necessary to address the above impairments and reach functional goals.     OBJECTIVE:   Observation/Posture: forward head and shoulder.  Neuro Screen: S1 reflex decreased bilaterally.    Lumbar  AROM: (* denotes performed with pain)  Flexion: 75%  Extension: 75%  Sidebending: R 75%; L 75%  Rotation: R 75%; L 75%    Accessory motion: normal in lumbar spine.   Palpation: Pt not TTP     Strength: (* denotes performed with pain)  LE   Hip flexion (L2): R 5/5; L 4+/5  Hip abduction: R 4/5; L 4/5  Hip ER: R 5/5; L 4+/5  Hip IR: R 5/5; L 4+/5  Knee Flexion: R 5/5; L 5/5   Knee extension (L3): R 5/5; L 5/5   DF (L4): R 5/5; L 5/5       Flexibility:   LE   Hip Flexor: R slightly limited, L WNL  Hamstrings: R WNL; L WNL  Piriformis: R WNL; L WNL  Quads: R sligghtly limited; L WNL  Gastroc-soleus: R NT; L NT     Special tests:   Sl heel raises: decreased jhonny on L as compared to R.     Gait: pt ambulates on level ground with normal mechanics.  Balance: SLS R 6, L 4    Today’s Treatment and Response:   Pt education was provided on exam findings, treatment diagnosis, treatment plan, expectations, and prognosis. Pt was also provided recommendations for possible soreness after evaluation    Patient was instructed in and issued a HEP for:    Access Code: [email or web address]  URL: [email or web address]  Date: 09/09/2024  Prepared by: Kathrin Adams    Exercises  - Hip Abduction with Resistance Loop  - 1 x daily - 7 x weekly - 1 sets - 30 reps  - Supine Dead Bug with Leg Extension  - 1 x daily - 7 x weekly - 2 sets - 10 reps- LE only.    Charges: PT Eval Moderate Complexity, x1 ther ex (10 min)      Total Timed Treatment: 10 min     Total Treatment Time: 40 min     Based on clinical rationale and outcome measures, this evaluation involved Moderate Complexity decision making due to 1-2 personal factors/comorbidities, 3 body structures involved/activity limitations, and evolving symptoms including changing pain levels.  PLAN OF CARE:    Goals: (to be met in 10 visits)   -Within 5 sessions, pt will be consistent and independent w/ HEP, thus facilitating recovery.   -Within 5 session, pt will report no greater than 2/10 pain on a daily basis.   -Within 10 sessions, pt will demonstrate symmetric length/ flexibility between R/L quads and hip flexors, allowing hip to walk longer period of time w/ normal mechanics.   -Within 10 sessions, pt will demonstrate at 5/5 strength in all hip muscles, allowing hip to stand > 20 min w/o pain.     Frequency / Duration: Patient will be seen for 1-2 x/week or a total of 10 visits over a 90 day period. Treatment will include: Gait training, Manual Therapy, Mechanical Traction, Neuromuscular Re-education, Self-Care Home Management, Therapeutic Activities, Therapeutic Exercise, Home Exercise Program instruction, and Modalities to include: heat/cold.    Education or treatment limitation: None  Rehab Potential:good    Oswestry Disability Index Score  Score: 16 % (9/7/2024  2:01 PM)      Patient/Family/Caregiver was advised of these findings, precautions, and treatment options and has agreed to actively participate in planning and for this course of care.    Thank you for your referral. Please co-sign or sign and return  this letter via fax as soon as possible to [phone]. If you have any questions, please contact me at Dept: [phone]    Sincerely,  Electronically signed by therapist: Kathrin Adams PT    Physician's certification required: Yes  I certify the need for these services furnished under this plan of treatment and while under my care.    X___________________________________________________ Date____________________    Certification From: 9/9/2024  To:12/8/2024

## 2024-09-11 ENCOUNTER — OFFICE VISIT (OUTPATIENT)
Dept: PHYSICAL THERAPY | Facility: HOSPITAL | Age: 69
End: 2024-09-11
Attending: FAMILY MEDICINE
Payer: MEDICARE

## 2024-09-11 PROCEDURE — 97112 NEUROMUSCULAR REEDUCATION: CPT

## 2024-09-11 PROCEDURE — 97110 THERAPEUTIC EXERCISES: CPT

## 2024-09-11 NOTE — PROGRESS NOTES
Diagnosis:   Lower back pain        Referring Provider: Jo Ann Wilson  Date of Evaluation:    09/09/24    Precautions:  None Next MD visit:   none scheduled  Date of Surgery: n/a   Insurance Primary/Secondary: MEDICARE / [place]DEMN[place]     # Auth Visits: medicare            Subjective: pt reports he has had no difficulty w/ his HEP so far.     Pain: 0/10      Objective: see table.       Assessment: Pt tolerated session well. Required cues to prevent lateral lean during band walks and hip abd. Also required cues to keep movements slow and controlled. Pt will continue to benefit from skilled PT in order to address deficits.       Goals:     -Within 5 sessions, pt will be consistent and independent w/ HEP, thus facilitating recovery.   -Within 5 session, pt will report no greater than 2/10 pain on a daily basis.   -Within 10 sessions, pt will demonstrate symmetric length/ flexibility between R/L quads and hip flexors, allowing hip to walk longer period of time w/ normal mechanics.   -Within 10 sessions, pt will demonstrate at 5/5 strength in all hip muscles, allowing hip to stand > 20 min w/o pain.     Plan: progress as able.   Date: 9/11/2024  TX#: 2/6 Date:                 TX#: 3/ Date:                 TX#: 4/ Date:                 TX#: 5/ Date:   Tx#: 6/   Ther ex:   Nu step magaña 6 min at lv 4   Standing hip abd x20 R/L w/ GTB   Clamshells x20 R/L w/ GTB   Dead bugs - LE only 2x10 R/L   Hooklying marches x30 w/ GTB   X15 bridges   2x10 bridges w/ Hip abd w/ GTB  Lateral band walk w/ GTB x20 ft R/L           Neuro re-ed   Tandem on airex weight transfer w/ 8lb -x several min R/L  Anti-rotation presses x15 R/L                     HEP: Access Code: [email or web address]  URL: [email or web address]  Date: 09/11/2024  Prepared by: Kathrin Adams    Exercises  - Hip Abduction with Resistance Loop  - 1 x daily - 7 x weekly - 1 sets - 30 reps  - Supine Dead Bug with Leg Extension  - 1 x daily - 7 x weekly - 2 sets - 10  reps  - Supine Bridge  - 1 x daily - 7 x weekly - 2 sets - 15 reps  - Clamshell with Resistance  - 1 x daily - 7 x weekly - 2 sets - 15 reps  - Supine March with Resistance Band  - 1 x daily - 7 x weekly - 2 sets - 20 reps  - Side Stepping with Resistance at Ankles  - 1 x daily - 7 x weekly - 1 sets - 20 reps       Charges: x2 ther ex (30min) x1 neuro re-ed (10 min)        Total Timed Treatment: 40 min  Total Treatment Time: 40 min

## 2024-09-16 ENCOUNTER — OFFICE VISIT (OUTPATIENT)
Dept: PHYSICAL THERAPY | Facility: HOSPITAL | Age: 69
End: 2024-09-16
Attending: FAMILY MEDICINE
Payer: MEDICARE

## 2024-09-16 PROCEDURE — 97112 NEUROMUSCULAR REEDUCATION: CPT

## 2024-09-16 PROCEDURE — 97110 THERAPEUTIC EXERCISES: CPT

## 2024-09-16 NOTE — PROGRESS NOTES
Diagnosis:   Lower back pain        Referring Provider: Jo Ann Wilson  Date of Evaluation:    09/09/24    Precautions:  None Next MD visit:   none scheduled  Date of Surgery: n/a   Insurance Primary/Secondary: MEDICARE / [place]DEMN[place]     # Auth Visits: medicare            Subjective: pt reports his back has been pretty good. Had some pain on Saturday while walking a lot. Pain stared after walking a little over a block. Pain goes away after sitting for a few min.     Pain: 0/10      Objective: see table.       Assessment: Pt tolerated session well. Glute and core strength progressed and pt did not report any symptoms w/ any exercises. SL balance continues to be limited. Pt will continue to benefit from skilled PT in order to address deficits.       Goals:     -Within 5 sessions, pt will be consistent and independent w/ HEP, thus facilitating recovery.   -Within 5 session, pt will report no greater than 2/10 pain on a daily basis.   -Within 10 sessions, pt will demonstrate symmetric length/ flexibility between R/L quads and hip flexors, allowing hip to walk longer period of time w/ normal mechanics.   -Within 10 sessions, pt will demonstrate at 5/5 strength in all hip muscles, allowing hip to stand > 20 min w/o pain.     Plan: progress as able.   Date: 9/11/2024  TX#: 2/6 Date:  9/16/24           TX#: 3/6 Date:                 TX#: 4/ Date:                 TX#: 5/ Date:   Tx#: 6/   Ther ex:   Nu step magaña 6 min at lv 4   Standing hip abd x20 R/L w/ GTB   Clamshells x20 R/L w/ GTB   Dead bugs - LE only 2x10 R/L   Hooklying marches x30 w/ GTB   X15 bridges   2x10 bridges w/ Hip abd w/ GTB  Lateral band walk w/ GTB x20 ft R/L     Ther ex:   Nu step magaña 6 min at lv 5  Dead bugs - LE only 2x10 R/L   2x10 bridges w/ glute set.   X10 bridges w/ sl march  Clamshells x20 R/L w/ GTB   X20 hooklyign SL raises x 20 R/L  Lateral band walk w/ GTB x20 ft R/L  Stir the pot w/ black rope x 20 R/L           Neuro re-ed   Tandem on  airex weight transfer w/ 8lb -x several min R/L  Anti-rotation presses x15 R/L Neuro re-ed   SL balance- multiple rounds   tandem on airex - multiple rounds   Anti-rotation walk outs- 25 lb x3 R/L                       HEP: Access Code: [email or web address]  URL: [email or web address]  Date: 09/11/2024  Prepared by: Kathrin Adams    Exercises  - Hip Abduction with Resistance Loop  - 1 x daily - 7 x weekly - 1 sets - 30 reps  - Supine Dead Bug with Leg Extension  - 1 x daily - 7 x weekly - 2 sets - 10 reps  - Supine Bridge  - 1 x daily - 7 x weekly - 2 sets - 15 reps  - Clamshell with Resistance  - 1 x daily - 7 x weekly - 2 sets - 15 reps  - Supine March with Resistance Band  - 1 x daily - 7 x weekly - 2 sets - 20 reps  - Side Stepping with Resistance at Ankles  - 1 x daily - 7 x weekly - 1 sets - 20 reps       Charges: x2 ther ex (30min) x1 neuro re-ed (10 min)        Total Timed Treatment: 40 min  Total Treatment Time: 40 min

## 2024-09-18 ENCOUNTER — OFFICE VISIT (OUTPATIENT)
Dept: PHYSICAL THERAPY | Facility: HOSPITAL | Age: 69
End: 2024-09-18
Attending: FAMILY MEDICINE
Payer: MEDICARE

## 2024-09-18 PROCEDURE — 97112 NEUROMUSCULAR REEDUCATION: CPT

## 2024-09-18 PROCEDURE — 97110 THERAPEUTIC EXERCISES: CPT

## 2024-09-18 NOTE — PROGRESS NOTES
Diagnosis:   Lower back pain        Referring Provider: Jo Ann Wilson  Date of Evaluation:    09/09/24    Precautions:  None Next MD visit:   none scheduled  Date of Surgery: n/a   Insurance Primary/Secondary: [place]IC[place] / [place]     # Auth Visits: medicare            Subjective: pt reports he has been good- nothing is new.     Pain: 0/10      Objective: see table.   SL balance: ~ 12sec on L   ~19 sec on R      Assessment: Pt tolerated session well. SL balance increased this session. No Symptoms reported by end of session.  Pt will continue to benefit from skilled PT in order to address deficits.       Goals:     -Within 5 sessions, pt will be consistent and independent w/ HEP, thus facilitating recovery.   -Within 5 session, pt will report no greater than 2/10 pain on a daily basis.   -Within 10 sessions, pt will demonstrate symmetric length/ flexibility between R/L quads and hip flexors, allowing hip to walk longer period of time w/ normal mechanics.   -Within 10 sessions, pt will demonstrate at 5/5 strength in all hip muscles, allowing hip to stand > 20 min w/o pain.     Plan: progress as able.   Date: 9/11/2024  TX#: 2/6 Date:  9/16/24           TX#: 3/6 Date:     09/18/24            TX#: 4/6 Date:                 TX#: 5/ Date:   Tx#: 6/   Ther ex:   Nu step magaña 6 min at lv 4   Standing hip abd x20 R/L w/ GTB   Clamshells x20 R/L w/ GTB   Dead bugs - LE only 2x10 R/L   Hooklying marches x30 w/ GTB   X15 bridges   2x10 bridges w/ Hip abd w/ GTB  Lateral band walk w/ GTB x20 ft R/L     Ther ex:   Nu step magaña 6 min at lv 5  Dead bugs - LE only 2x10 R/L   2x10 bridges w/ glute set.   X10 bridges w/ sl march  Clamshells x20 R/L w/ GTB   X20 hooklyign SL raises x 20 R/L  Lateral band walk w/ GTB x20 ft R/L  Stir the pot w/ black rope x 20 R/L      Ther ex:   Nu step magaña 6 min at lv 5  Dead bugs - LE only 2x10 R/L   X10 SL bridges R/L  Clamshells x20 R/L w/ GTB   2x10 bridges w/ Hip abd w/ GTB  Lateral band walks  and forward monster walks x 20 each R/L        Neuro re-ed   Tandem on airex weight transfer w/ 8lb -x several min R/L  Anti-rotation presses x15 R/L Neuro re-ed   SL balance- multiple rounds   tandem on airex - multiple rounds   Anti-rotation walk outs- 25 lb x3 R/L    Neuro re-ed   Tandem on airex weight transfer w/ 8lb -x several min R/L  Anti-rotation walk outs- 25 lb x5 R/L                        HEP: Access Code: [email or web address]  URL: [email or web address]  Date: 09/11/2024  Prepared by: Kathrin Adams    Exercises  - Hip Abduction with Resistance Loop  - 1 x daily - 7 x weekly - 1 sets - 30 reps  - Supine Dead Bug with Leg Extension  - 1 x daily - 7 x weekly - 2 sets - 10 reps  - Supine Bridge  - 1 x daily - 7 x weekly - 2 sets - 15 reps  - Clamshell with Resistance  - 1 x daily - 7 x weekly - 2 sets - 15 reps  - Supine March with Resistance Band  - 1 x daily - 7 x weekly - 2 sets - 20 reps  - Side Stepping with Resistance at Ankles  - 1 x daily - 7 x weekly - 1 sets - 20 reps       Charges: x2 ther ex (30min) x1 neuro re-ed (10 min)        Total Timed Treatment: 40 min  Total Treatment Time: 40 min

## 2024-09-23 ENCOUNTER — OFFICE VISIT (OUTPATIENT)
Dept: PHYSICAL THERAPY | Facility: HOSPITAL | Age: 69
End: 2024-09-23
Attending: FAMILY MEDICINE
Payer: MEDICARE

## 2024-09-23 PROCEDURE — 97110 THERAPEUTIC EXERCISES: CPT

## 2024-09-23 PROCEDURE — 97112 NEUROMUSCULAR REEDUCATION: CPT

## 2024-09-23 NOTE — PROGRESS NOTES
Diagnosis:   Lower back pain        Referring Provider: Jo Ann Wilson  Date of Evaluation:    09/09/24    Precautions:  None Next MD visit:   none scheduled  Date of Surgery: n/a   Insurance Primary/Secondary: [place]IC[place] / [place]     # Auth Visits: medicare            Subjective: pt reports he has been good. Some cramping in HS w/ SL bridges     Pain: 0/10      Objective: see table.   SL balance: ~ 12sec on L   ~19 sec on R      Assessment: Pt tolerated session well. Demonstrated general posterior chain weakness during bridges. SL continues to improve.  3 way hip swing added to HEP.  Pt will continue to benefit from skilled PT in order to address deficits.       Goals:     -Within 5 sessions, pt will be consistent and independent w/ HEP, thus facilitating recovery. - met   -Within 5 session, pt will report no greater than 2/10 pain on a daily basis.- Partially met   -Within 10 sessions, pt will demonstrate symmetric length/ flexibility between R/L quads and hip flexors, allowing hip to walk longer period of time w/ normal mechanics. - NT this date   -Within 10 sessions, pt will demonstrate at 5/5 strength in all hip muscles, allowing hip to stand > 20 min w/o pain. - NT this date.     Plan: progress as able.   Date: 9/11/2024  TX#: 2/6 Date:  9/16/24           TX#: 3/6 Date:     09/18/24            TX#: 4/6 Date: 09/23/24               TX#: 5/6 Date:   Tx#: 6/   Ther ex:   Nu step magaña 6 min at lv 4   Standing hip abd x20 R/L w/ GTB   Clamshells x20 R/L w/ GTB   Dead bugs - LE only 2x10 R/L   Hooklying marches x30 w/ GTB   X15 bridges   2x10 bridges w/ Hip abd w/ GTB  Lateral band walk w/ GTB x20 ft R/L     Ther ex:   Nu step magaña 6 min at lv 5  Dead bugs - LE only 2x10 R/L   2x10 bridges w/ glute set.   X10 bridges w/ sl march  Clamshells x20 R/L w/ GTB   X20 hooklyign SL raises x 20 R/L  Lateral band walk w/ GTB x20 ft R/L  Stir the pot w/ black rope x 20 R/L      Ther ex:   Nu step magaña 6 min at lv 5  Dead bugs  - LE only 2x10 R/L   X10 SL bridges R/L  Clamshells x20 R/L w/ GTB   2x10 bridges w/ Hip abd w/ GTB  Lateral band walks and forward monster walks x 20 each R/L    Ther ex:   Nu step magaña 6 min at lv 5  Bridges w/ SL marches 2x10   X20 hooklyign SL raises x 20 R/L  Dead bugs - LE only 2x10 R/L   Stir the pot w/ red cord x 30 R/L   3 way leg swing- w. RTB x 15 R/L     Neuro re-ed   Tandem on airex weight transfer w/ 8lb -x several min R/L  Anti-rotation presses x15 R/L Neuro re-ed   SL balance- multiple rounds   tandem on airex - multiple rounds   Anti-rotation walk outs- 25 lb x3 R/L    Neuro re-ed   Tandem on airex weight transfer w/ 8lb -x several min R/L  Anti-rotation walk outs- 25 lb x5 R/L      Neuro re-ed   Tandem on airex weight transfer w/ 8lb -x several min R/L  SL balance- multiple rounds - 15 on L, 25 on R  Walk outs w/ 50lbs x6                   HEP:Access Code: [email or web address]  URL: [email or web address]  Date: 09/23/2024  Prepared by: Kathrin Adams    Exercises  - Hip Abduction with Resistance Loop  - 1 x daily - 7 x weekly - 1 sets - 30 reps  - Supine Dead Bug with Leg Extension  - 1 x daily - 7 x weekly - 2 sets - 10 reps  - Supine Bridge  - 1 x daily - 7 x weekly - 2 sets - 15 reps  - Clamshell with Resistance  - 1 x daily - 7 x weekly - 2 sets - 15 reps  - Supine March with Resistance Band  - 1 x daily - 7 x weekly - 2 sets - 20 reps  - Side Stepping with Resistance at Ankles  - 1 x daily - 7 x weekly - 1 sets - 20 reps  - Single Leg Bridge  - 1 x daily - 7 x weekly - 2 sets - 10 reps  - Standing 3-Way Leg Reach with Resistance at Ankles and Counter Support  - 1 x daily - 7 x weekly - 1 sets - 15 reps       Charges: x2 ther ex (30min) x1 neuro re-ed (10 min)        Total Timed Treatment: 40 min  Total Treatment Time: 40 min

## 2024-09-25 ENCOUNTER — APPOINTMENT (OUTPATIENT)
Dept: PHYSICAL THERAPY | Facility: HOSPITAL | Age: 69
End: 2024-09-25
Attending: FAMILY MEDICINE
Payer: MEDICARE

## 2024-09-27 NOTE — PROGRESS NOTES
Chief Complaint   Patient presents with    Diabetes     Follow up-Dexcom         Eric SUNDEEP Rodriguez is a 69 year old presenting for type 2 diabetes management.   Primary care physician: Jo Ann Wilson DO  Last DM appt  5.2024   Started Farxiga 10mg once daily   Tolerating well. No issues w UTI or mycotic infections    BP lower ; reviewed how SGLT2i can impact BG trends          Most recent  A1C 6.5 ( last A1C 6.5% )     Due to cost concerns, he is using 2mg ozempic pen and dosing 0.5mg weeklyh (18- 22  clicks weekly)         Reviewed CGM report/trends w patient today:   Dexcom (full download in media)   GMI 7.0%     Average glucose: 152 mg/dl     Hypoglycemia 0%    TIR 82 %  (ADA recommended goal > 70%)   Variability WNL ( < 33%)         Diabetes History:  Type 2 DM: ~ 2008   Patient has not had hospitalizations for blood sugar issues  denies any history of pancreatitis      Previous DM therapies:  Glyburide ; change in therapy   Janumet : change in therapy   Levemir : 8-2023 : concentrated basal rx       Current DM Regimen:  Metformin 1000mg twice daily   Farxiga 10mg once daily   Ozempic 0.5mg subcutaneous once weekly (dosing on 2mg pen)    Toujeo max solo star: 40 units once daily in AM   Novolog flex pen: 14 units at breakfast , 10 units at dinner       HGBA1C:    Lab Results   Component Value Date    A1C 5.9 (H) 07/25/2024    A1C 6.5 (H) 04/23/2024    A1C 6.5 (H) 12/07/2023     07/25/2024       Lab Results   Component Value Date    CHOLEST 150 07/25/2024    CHOLEST 149 04/23/2024    TRIG 128 07/25/2024    TRIG 140 04/23/2024    HDL 44 07/25/2024    HDL 42 04/23/2024    LDL 83 07/25/2024    LDL 82 04/23/2024     Lab Results   Component Value Date    MICROALBCREA 21.7 04/23/2024    MICROALBCREA 68.5 (H) 12/07/2023      Lab Results   Component Value Date    CREATSERUM 1.37 (H) 07/25/2024    CREATSERUM 1.45 (H) 04/23/2024    EGFRCR 55 (L) 07/25/2024    EGFRCR 52 (L) 04/23/2024     Lab Results   Component  Value Date    AST 15 07/25/2024    AST 16 04/23/2024    ALT 19 07/25/2024    ALT 33 04/23/2024       Lab Results   Component Value Date    TSH 1.910 04/23/2024    TSH 1.500 02/17/2023           DM Complications:  Microvascular:   Neuropathy: no  Retinopathy: no  Nephropathy: yes    Macrovascular:  PVD: no  CAD: yes Dr Issa . Medical management (no hx CABG or stents)   Stroke/CVA: no        Modifying factors:  Medication adherence: yes   Recent steroids, illness or infections ( past 3m): no     Allergies: Dander, Other, Penicillins, and Tetanus toxoids    Past Medical History:    Elizondo's esophagus    Blood in urine    Enlarged badder, heavy for week after catheter inserted    COPD (chronic obstructive pulmonary disease) (HCC)    Diabetes mellitus (HCC)    Disorder of prostate    Turp scheduled for Merlyn 3    Esophageal reflux    Frequent urination    Hemorrhoids    Dr Nieves rubber banded 1.5 years ago    Hiatal hernia    High cholesterol    Painful urination    Sleep apnea    Unspecified essential hypertension    Unspecified gastritis and gastroduodenitis without mention of hemorrhage    Weight loss     Past Surgical History:   Procedure Laterality Date    Colonoscopy  03/30/2007    Colonoscopy  2013    Dr. 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 Gil; due in 2 years    Other surgical history N/A 7/2/2015    Procedure: ESOPHAGOGASTRODUODENOSCOPY (EGD);  Surgeon: Crescencio Gil MD;  Location:  ENDOSCOPY     Social History     Socioeconomic History    Marital status:    Tobacco Use    Smoking status: Every Day     Current packs/day: 1.00     Average packs/day: 1 pack/day for  30.0 years (30.0 ttl pk-yrs)     Types: Cigarettes    Smokeless tobacco: Never    Tobacco comments:     1.0 pack weekends, only 4 cigs QD week days   Vaping Use    Vaping status: Never Used   Substance and Sexual Activity    Alcohol use: Not Currently     Alcohol/week: 0.0 standard drinks of alcohol     Comment: 2-3 in the last 3 months    Drug use: No    Sexual activity: Yes   Other Topics Concern    Caffeine Concern Yes     Comment: 3-4 coffee daily    Exercise Yes     Comment: occ     Social Determinants of Health     Physical Activity: Unknown (3/10/2021)    [email or web address]    Exercise Vital Sign     Days of Exercise per Week: 6 days     Family History   Problem Relation Age of Onset    Other (CHF[other]) Mother     Diabetes Mother         Type II    Diabetes Father         Type II    Other (Other[other]) Father     Stroke Father     Other (Amputation[other]) Brother         leg below knee    Diabetes Brother     Cancer Sister         colon    Diabetes Brother         Type II    Other (Diabetic retinopathy[other]) Brother     Cancer Sister         liver    Cancer Sister         ovarian     Current Medication List:   Current Outpatient Medications   Medication Sig Dispense Refill    dapagliflozin (FARXIGA) 10 MG Oral Tab Take by mouth daily.      finasteride 5 MG Oral Tab Take 1 tablet (5 mg total) by mouth daily. 90 tablet 6    tamsulosin 0.4 MG Oral Cap Take 1 capsule (0.4 mg total) by mouth daily. Take 1/2 hour following the same meal each day 90 capsule 5    hydroCHLOROthiazide 25 MG Oral Tab TAKE 1 TABLET BY MOUTH ONE TIME DAILY 90 tablet 1    Olmesartan Medoxomil 40 MG Oral Tab TAKE 1 TABLET BY MOUTH ONE TIME DAILY 90 tablet 1    metFORMIN HCl 1000 MG Oral Tab Take 1 tablet (1,000 mg total) by mouth 2 (two) times daily with meals. 180 tablet 1    Continuous Glucose Sensor (DEXCOM G7 SENSOR) Does not apply Misc 1 each Every 10 days.      NOVOLOG FLEXPEN 100  UNIT/ML Subcutaneous Solution Pen-injector TDD = 50 units  before meals 3 x daily as directed 45 mL 0    Insulin Glargine, 2 Unit Dial, (TOUJEO MAX SOLOSTAR) 300 UNIT/ML Subcutaneous Solution Pen-injector Use up to 65 units daily as directed 12 mL 1    mometasone 0.1 % External Ointment Apply 1 Application topically in the morning and 1 Application before bedtime. 45 g 1    EZETIMIBE-SIMVASTATIN 10-40 MG Oral Tab TAKE ONE TABLET BY MOUTH EVERY OTHER DAY 45 tablet 1    omega-3-acid ethyl esters 1 g Oral Cap TAKE 2 CAPSULES BY MOUTH TWICE A  capsule 1    semaglutide 8 MG/3ML Subcutaneous Solution Pen-injector Inject 2 mg into the skin once a week. 9 mL 1    omeprazole 20 MG Oral Capsule Delayed Release Take 1 capsule (20 mg total) by mouth every morning before breakfast.      Fluticasone Propionate 50 MCG/ACT Nasal Suspension 2 sprays by Each Nare route daily as needed for Rhinitis.      Glucose Blood (CONTOUR NEXT TEST) In Vitro Strip 1 each by Other route 4 (four) times daily with meals and nightly. 400 each 3    Tiotropium Bromide Monohydrate (SPIRIVA HANDIHALER) 18 MCG Inhalation Cap Inhale 1 capsule (18 mcg total) into the lungs daily. 90 capsule 1    Insulin Pen Needle (BD PEN NEEDLE SHORT U/F) 31G X 8 MM Does not apply Misc Please disregard previous qty on this. Pt uses up to 4x a day. Pt uses 360 a month~ 400 each 1    Niacin  MG Oral Tab CR Take 1 tablet (500 mg total) by mouth 2 (two) times daily with meals.      Multiple Vitamins-Minerals (CENTRUM SILVER OR) Take by mouth.        metoprolol tartrate 100 MG Oral Tab  (Patient not taking: Reported on 9/30/2024)             DM associated review of  symptoms:   Endocrine: Polyuria, polyphagia, polydipsia: no  Neurological: Paresthesias: yes   HEENT: Blurred vision: no  Skin: no rash or wounds  Hematological: Hypoglycemia: no      Review of Systems     LUNGS: denies shortness of breath   CARDIOVASCULAR: denies chest pain  GI: denies abdominal pain,  nausea or diarrhea   : denies dysuria      Physical exam:  BP 98/50   Pulse 81   Resp 16   Ht 5' 10\" (1.778 m)   Wt 231 lb (104.8 kg)   SpO2 96%   BMI 33.15 kg/m²   Body mass index is 33.15 kg/m².    Physical Exam     Constitutional: Normal appearance   Cardiovascular: Normal rate   Pulmonary/Chest: Effort normal  Neurological: Alert and oriented .   Psychiatric: Normal mood and affect.         Assessment/Plan:    Hypertension  98-50 today   Discussed decreasing hydrochlorothiazide to 12.5mg ; pt desires to discuss w Dr Contreras   For now , CPM       Dyslipidemia:   Last  labs 4-2024   Needs ongoing monitoring   Continue vytorin 10-40mg  rx     CKD stage 3 a   Continue arb, Farxiga and GLP 1 (declines dose increase due to cost)     Obesity   Weight 231 lb ( last weight:  244   lb )   Continue glp1 rx - see DM     CAD   Per ADA standards of care, guidelines, in patients with type 2 diabetes and established ASCVD or indicators of high CV risk dsease,  incorporating agents proven to reduce major adverse CV events and CV mortality is indicated  Continue glp1 rx - see DM          Type 2 diabetes mellitus with hyperglycemia, with long-term current use of insulin ([facility])  A1C: 6.5  % ( last A1C 5.9%)   Weight: 231 lb ( last weight: 246  lb )     Diabetes control is increased but less hypoglycemia on CGM ; needs ongoing management and evaluation  given the chronicity of Diabetes, ongoing medication monitoring and risk for complications     Continue    Toujeo max 40  units once daily   Novolog flex pen: 14  units   w breakfast  Dinner: 10 units , add more if higher carb and BG is over 120mg/dl   Ozempic 0.5mg subcutaneous once weekly (dosing on Ozempic 2.0mg pen for cost savings)   Reminded to store ozempic pen  refrigerated for all doses   Dexcom G7 (Medicare at [facility] )   Metformin 1000mg twice daily    (7-1014 EGFR ok )   Farxiga 10mg once daily   ~reminded to stay hydrated and watch for s/s of uti or yeast  infections       Reviewed  clinical significance of A1c, adverse effects of suboptimal glucose control, and goals of therapy   Reviewed the A1C test, what the value reflects and the goal for the patient.   Reminded pt on A1C and blood sugar targets (Fasting < 130 and post prandial <180 ) and complications associated with hyperglycemia and uncontrolled DM (on AVS)   Recommended SMBG 2- x daily if not using CGM   Reviewed s/s and treatment of hypoglycemia (on AVS)   Cost barrier w Glucagon - pt has low alert on Dexcom at 70 mg/dl   Continue with lifestyle modifications since they have positive impact on diabetes/blood sugars/health (portion control, physical activity, weight loss)   Reinforced timing and adherence with medication, self-monitoring of blood glucose and routine follow up  In office appt w me -4-5  m  but recommended to contact DM clinic sooner if questions or concerns.    The patient indicates understanding of these issues and agrees to the plan.      Orders Placed This Encounter    POC Hemoglobin A1C     Order Specific Question:   Release to patient     Answer:   Immediate     Diabetes complications & risks surveillance:   A1C/Blood pressure: as reported above   Nephropathy screenin   continue ace /arb rx.   LIPID screenin  . +  statin rx.   Last dilated eye exam: Last Dilated Eye Exam: 24   Exam shows retinopathy? Eye Exam shows Diabetic Retinopathy?: No  Last diabetic foot exam: Last Foot Exam: 24

## 2024-09-30 ENCOUNTER — TELEPHONE (OUTPATIENT)
Dept: FAMILY MEDICINE CLINIC | Facility: CLINIC | Age: 69
End: 2024-09-30

## 2024-09-30 ENCOUNTER — OFFICE VISIT (OUTPATIENT)
Age: 69
End: 2024-09-30
Payer: MEDICARE

## 2024-09-30 VITALS
HEART RATE: 81 BPM | SYSTOLIC BLOOD PRESSURE: 98 MMHG | WEIGHT: 231 LBS | RESPIRATION RATE: 16 BRPM | HEIGHT: 70 IN | BODY MASS INDEX: 33.07 KG/M2 | DIASTOLIC BLOOD PRESSURE: 50 MMHG | OXYGEN SATURATION: 96 %

## 2024-09-30 DIAGNOSIS — E11.40 TYPE 2 DIABETES MELLITUS WITH DIABETIC NEUROPATHY, WITH LONG-TERM CURRENT USE OF INSULIN (HCC): Primary | ICD-10-CM

## 2024-09-30 DIAGNOSIS — E78.5 DYSLIPIDEMIA ASSOCIATED WITH TYPE 2 DIABETES MELLITUS (HCC): ICD-10-CM

## 2024-09-30 DIAGNOSIS — I25.10 CORONARY ARTERY DISEASE INVOLVING NATIVE CORONARY ARTERY OF NATIVE HEART WITHOUT ANGINA PECTORIS: ICD-10-CM

## 2024-09-30 DIAGNOSIS — E66.01 CLASS 2 SEVERE OBESITY DUE TO EXCESS CALORIES WITH SERIOUS COMORBIDITY IN ADULT, UNSPECIFIED BMI (HCC): ICD-10-CM

## 2024-09-30 DIAGNOSIS — I15.2 HYPERTENSION ASSOCIATED WITH TYPE 2 DIABETES MELLITUS (HCC): ICD-10-CM

## 2024-09-30 DIAGNOSIS — E11.59 HYPERTENSION ASSOCIATED WITH TYPE 2 DIABETES MELLITUS (HCC): ICD-10-CM

## 2024-09-30 DIAGNOSIS — N18.31 CKD STAGE 3A, GFR 45-59 ML/MIN (HCC): ICD-10-CM

## 2024-09-30 DIAGNOSIS — Z79.4 TYPE 2 DIABETES MELLITUS WITH DIABETIC NEUROPATHY, WITH LONG-TERM CURRENT USE OF INSULIN (HCC): Primary | ICD-10-CM

## 2024-09-30 DIAGNOSIS — E11.69 DYSLIPIDEMIA ASSOCIATED WITH TYPE 2 DIABETES MELLITUS (HCC): ICD-10-CM

## 2024-09-30 LAB — HEMOGLOBIN A1C: 6.5 % (ref 4.3–5.6)

## 2024-09-30 PROCEDURE — 83036 HEMOGLOBIN GLYCOSYLATED A1C: CPT | Performed by: NURSE PRACTITIONER

## 2024-09-30 PROCEDURE — 99214 OFFICE O/P EST MOD 30 MIN: CPT | Performed by: NURSE PRACTITIONER

## 2024-09-30 PROCEDURE — 95251 CONT GLUC MNTR ANALYSIS I&R: CPT | Performed by: NURSE PRACTITIONER

## 2024-09-30 NOTE — PROGRESS NOTES
-----------------------------  Dexcom Clarity  -----------------------------  Eric Rodriguez  YOB: 1955  Generated at: Mon, Sep 30, 2024 9:39 AM CDT  Reporting period: Tue Sep 17, 2024 - Mon Sep 30, 2024  -----------------------------  Glucose Details  Average glucose: 152 mg/dL  Standard deviation: 32 mg/dL  GMI: N/A  -----------------------------  Time in Range  Very High: 0%  High: 18%  In Range: 82%  Low: 0%  Very Low: <1%    Target Range   mg/dL    -----------------------------  CGM Details  Sensor usage: 57%  Days with CGM data: 8/14

## 2024-09-30 NOTE — PATIENT INSTRUCTIONS
A1C 6.5% (up from 5.9%)     Your trends are up but well controlled.     Continue     Metformin 1000mg twice daily   Farxiga 10mg once daily   Ozempic 0.5mg subcutaneous once weekly (18 clicks)       Pedro Pablo scott star: 40 units once daily in AM   Novolog flex pen: 10  units at meals    Higher carb add + 2 - 4 units     Do not give extra at meals if your Dexcom reading is less than 120 mg/dl         American Diabetes Association: blood sugar targets:     Fasting blood sugar (before breakfast) Target:    (ideally less than 110)  2 hours after eating less than 180 (ideally less than  150 )     Call for blood sugars less than  75 or greater than  200 more than 2 times in a week     If you are wearing the sensor, please look at your trends/averages    Recommendations:   GMI (estimated A1C ) target under  7%     Time in range (healthy blood sugar targets) : goal is over 70%   less than 70 : goal is less than 4%   Over 180 : goal is less than 20 %   Over 250: goal is  less than 5%       Watch for low blood sugars: (less than 70 )    Treatment of Low Blood Glucose Action Plan  1. Check blood glucose to be sure that it is low. You cannot  always go by symptoms or how you feel. If in doubt, treat your low blood glucose anyway.  Rule of 15 :     2. Take 15 grams of carbohydrate (carb). Here are some choices:    4 oz. regular fruit juice  3-4 glucose tablets  6 oz. regular soda   7-8 jelly beans    3. Recheck blood glucose after 10-15 minutes. If blood glucose is still low (less than 70 mg/dl) repeat the treatment (step 2).    4. If your next meal is more than one hour away, eat a small snack.    5. If you’re not sure what caused your low blood glucose, call your healthcare provider.    6. Always check your blood glucose before you drive       To treat a low, I recommend you carry with you easy, pre-portioned treatment for low blood sugars that are 15G of carbs:   - Children sized squeeze pouch applesauce (high fiber +  carbs help prevent too high of a spike)  - Small children's sized juicebox- 15g carb --> 4oz juice box  - Glucose tablets from [email or web address], you can find them near diabetes supplies --> Note, you will need to eat 3-4 tablets to get to 15g of carbs  - Children sized fruit snack pack- look for one with 15 grams of total carbohydrate    AVOID complex carbs, or foods that contain fats along with carbs (like chocolate) can slow the absorption of glucose and should NOT be used to treat an emergency low

## 2024-10-02 DIAGNOSIS — E78.5 DYSLIPIDEMIA: ICD-10-CM

## 2024-10-02 RX ORDER — EZETIMIBE AND SIMVASTATIN 10; 40 MG/1; MG/1
1 TABLET ORAL EVERY OTHER DAY
Qty: 45 TABLET | Refills: 1 | Status: SHIPPED | OUTPATIENT
Start: 2024-10-02

## 2024-10-02 RX ORDER — OMEGA-3-ACID ETHYL ESTERS 1 G/1
CAPSULE, LIQUID FILLED ORAL
Qty: 360 CAPSULE | Refills: 1 | Status: SHIPPED | OUTPATIENT
Start: 2024-10-02

## 2024-10-09 DIAGNOSIS — Z79.4 TYPE 2 DIABETES MELLITUS WITH DIABETIC NEUROPATHY, WITH LONG-TERM CURRENT USE OF INSULIN (HCC): Primary | ICD-10-CM

## 2024-10-09 DIAGNOSIS — E11.40 TYPE 2 DIABETES MELLITUS WITH DIABETIC NEUROPATHY, WITH LONG-TERM CURRENT USE OF INSULIN (HCC): Primary | ICD-10-CM

## 2024-10-09 RX ORDER — INSULIN GLARGINE 300 U/ML
INJECTION, SOLUTION SUBCUTANEOUS
Qty: 12 ML | Refills: 0 | Status: SHIPPED | OUTPATIENT
Start: 2024-10-09

## 2024-10-10 RX ORDER — INSULIN ASPART 100 [IU]/ML
INJECTION, SOLUTION INTRAVENOUS; SUBCUTANEOUS
Qty: 60 ML | Refills: 1 | Status: SHIPPED | OUTPATIENT
Start: 2024-10-10

## 2024-10-24 ENCOUNTER — LAB ENCOUNTER (OUTPATIENT)
Dept: LAB | Age: 69
End: 2024-10-24
Attending: FAMILY MEDICINE
Payer: MEDICARE

## 2024-10-24 DIAGNOSIS — R35.0 FREQUENCY OF MICTURITION: ICD-10-CM

## 2024-10-24 DIAGNOSIS — Z87.39 HISTORY OF GOUT: ICD-10-CM

## 2024-10-24 DIAGNOSIS — E78.5 DYSLIPIDEMIA: ICD-10-CM

## 2024-10-24 DIAGNOSIS — I10 ESSENTIAL HYPERTENSION: ICD-10-CM

## 2024-10-24 DIAGNOSIS — Z79.4 TYPE 2 DIABETES MELLITUS WITH DIABETIC NEUROPATHY, WITH LONG-TERM CURRENT USE OF INSULIN (HCC): ICD-10-CM

## 2024-10-24 DIAGNOSIS — E11.40 TYPE 2 DIABETES MELLITUS WITH DIABETIC NEUROPATHY, WITH LONG-TERM CURRENT USE OF INSULIN (HCC): ICD-10-CM

## 2024-10-24 DIAGNOSIS — Z90.79 S/P TURP (TRANSURETHRAL RESECTION OF PROSTATE): ICD-10-CM

## 2024-10-24 LAB
ALBUMIN SERPL-MCNC: 5 G/DL (ref 3.2–4.8)
ALBUMIN/GLOB SERPL: 1.9 {RATIO} (ref 1–2)
ALP LIVER SERPL-CCNC: 99 U/L
ALT SERPL-CCNC: 27 U/L
ANION GAP SERPL CALC-SCNC: 6 MMOL/L (ref 0–18)
AST SERPL-CCNC: 18 U/L (ref ?–34)
BILIRUB SERPL-MCNC: 0.4 MG/DL (ref 0.2–1.1)
BUN BLD-MCNC: 42 MG/DL (ref 9–23)
CALCIUM BLD-MCNC: 10.1 MG/DL (ref 8.7–10.4)
CHLORIDE SERPL-SCNC: 105 MMOL/L (ref 98–112)
CHOLEST SERPL-MCNC: 138 MG/DL (ref ?–200)
CO2 SERPL-SCNC: 26 MMOL/L (ref 21–32)
CREAT BLD-MCNC: 1.43 MG/DL
EGFRCR SERPLBLD CKD-EPI 2021: 52 ML/MIN/1.73M2 (ref 60–?)
EST. AVERAGE GLUCOSE BLD GHB EST-MCNC: 140 MG/DL (ref 68–126)
FASTING PATIENT LIPID ANSWER: YES
FASTING STATUS PATIENT QL REPORTED: YES
GLOBULIN PLAS-MCNC: 2.7 G/DL (ref 2–3.5)
GLUCOSE BLD-MCNC: 152 MG/DL (ref 70–99)
HBA1C MFR BLD: 6.5 % (ref ?–5.7)
HDLC SERPL-MCNC: 42 MG/DL (ref 40–59)
LDLC SERPL CALC-MCNC: 63 MG/DL (ref ?–100)
NONHDLC SERPL-MCNC: 96 MG/DL (ref ?–130)
OSMOLALITY SERPL CALC.SUM OF ELEC: 297 MOSM/KG (ref 275–295)
POTASSIUM SERPL-SCNC: 4.9 MMOL/L (ref 3.5–5.1)
PROT SERPL-MCNC: 7.7 G/DL (ref 5.7–8.2)
SODIUM SERPL-SCNC: 137 MMOL/L (ref 136–145)
TRIGL SERPL-MCNC: 201 MG/DL (ref 30–149)
URATE SERPL-MCNC: 10.7 MG/DL
VLDLC SERPL CALC-MCNC: 30 MG/DL (ref 0–30)

## 2024-10-24 PROCEDURE — 80061 LIPID PANEL: CPT

## 2024-10-24 PROCEDURE — 84550 ASSAY OF BLOOD/URIC ACID: CPT

## 2024-10-24 PROCEDURE — 84154 ASSAY OF PSA FREE: CPT

## 2024-10-24 PROCEDURE — 84153 ASSAY OF PSA TOTAL: CPT

## 2024-10-24 PROCEDURE — 80053 COMPREHEN METABOLIC PANEL: CPT

## 2024-10-24 PROCEDURE — 83036 HEMOGLOBIN GLYCOSYLATED A1C: CPT

## 2024-10-24 PROCEDURE — 36415 COLL VENOUS BLD VENIPUNCTURE: CPT

## 2024-10-25 LAB
PROSTATE SPECIFIC AG: 0.3 NG/ML
PROSTATE SPECIFIC AG: 0.3 NG/ML

## 2024-10-29 ENCOUNTER — OFFICE VISIT (OUTPATIENT)
Dept: FAMILY MEDICINE CLINIC | Facility: CLINIC | Age: 69
End: 2024-10-29
Payer: MEDICARE

## 2024-10-29 VITALS
HEIGHT: 70 IN | HEART RATE: 84 BPM | RESPIRATION RATE: 16 BRPM | BODY MASS INDEX: 33.55 KG/M2 | DIASTOLIC BLOOD PRESSURE: 62 MMHG | WEIGHT: 234.38 LBS | SYSTOLIC BLOOD PRESSURE: 110 MMHG | OXYGEN SATURATION: 97 %

## 2024-10-29 DIAGNOSIS — Z79.899 MEDICATION MANAGEMENT: ICD-10-CM

## 2024-10-29 DIAGNOSIS — K22.719 BARRETT'S ESOPHAGUS WITH DYSPLASIA: ICD-10-CM

## 2024-10-29 DIAGNOSIS — E66.9 TYPE 2 DIABETES MELLITUS WITH OBESITY (HCC): ICD-10-CM

## 2024-10-29 DIAGNOSIS — Z90.79 S/P TURP (TRANSURETHRAL RESECTION OF PROSTATE): ICD-10-CM

## 2024-10-29 DIAGNOSIS — Z87.39 HISTORY OF GOUT: ICD-10-CM

## 2024-10-29 DIAGNOSIS — F17.200 CURRENT SMOKER: ICD-10-CM

## 2024-10-29 DIAGNOSIS — J44.9 CHRONIC OBSTRUCTIVE PULMONARY DISEASE, UNSPECIFIED COPD TYPE (HCC): ICD-10-CM

## 2024-10-29 DIAGNOSIS — E11.69 TYPE 2 DIABETES MELLITUS WITH OBESITY (HCC): ICD-10-CM

## 2024-10-29 DIAGNOSIS — E55.9 VITAMIN D DEFICIENCY: ICD-10-CM

## 2024-10-29 DIAGNOSIS — Z12.11 SCREENING FOR MALIGNANT NEOPLASM OF COLON: ICD-10-CM

## 2024-10-29 DIAGNOSIS — Z23 NEED FOR VACCINATION: ICD-10-CM

## 2024-10-29 DIAGNOSIS — Z79.4 TYPE 2 DIABETES MELLITUS WITH DIABETIC NEUROPATHY, WITH LONG-TERM CURRENT USE OF INSULIN (HCC): ICD-10-CM

## 2024-10-29 DIAGNOSIS — K44.9 DIAPHRAGMATIC HERNIA WITHOUT OBSTRUCTION AND WITHOUT GANGRENE: ICD-10-CM

## 2024-10-29 DIAGNOSIS — I15.2 HYPERTENSION ASSOCIATED WITH TYPE 2 DIABETES MELLITUS (HCC): Primary | ICD-10-CM

## 2024-10-29 DIAGNOSIS — E11.59 HYPERTENSION ASSOCIATED WITH TYPE 2 DIABETES MELLITUS (HCC): Primary | ICD-10-CM

## 2024-10-29 DIAGNOSIS — Z87.891 PERSONAL HISTORY OF NICOTINE DEPENDENCE: ICD-10-CM

## 2024-10-29 DIAGNOSIS — I35.9 AORTIC VALVE CALCIFICATION: ICD-10-CM

## 2024-10-29 DIAGNOSIS — I35.0 MILD AORTIC STENOSIS: ICD-10-CM

## 2024-10-29 DIAGNOSIS — Z71.85 VACCINE COUNSELING: ICD-10-CM

## 2024-10-29 DIAGNOSIS — I34.81 MITRAL VALVE ANNULAR CALCIFICATION: ICD-10-CM

## 2024-10-29 DIAGNOSIS — I25.10 CORONARY ARTERY DISEASE INVOLVING NATIVE CORONARY ARTERY OF NATIVE HEART WITHOUT ANGINA PECTORIS: ICD-10-CM

## 2024-10-29 DIAGNOSIS — E78.5 DYSLIPIDEMIA: ICD-10-CM

## 2024-10-29 DIAGNOSIS — E11.40 TYPE 2 DIABETES MELLITUS WITH DIABETIC NEUROPATHY, WITH LONG-TERM CURRENT USE OF INSULIN (HCC): ICD-10-CM

## 2024-10-29 PROCEDURE — G0008 ADMIN INFLUENZA VIRUS VAC: HCPCS | Performed by: FAMILY MEDICINE

## 2024-10-29 PROCEDURE — 90662 IIV NO PRSV INCREASED AG IM: CPT | Performed by: FAMILY MEDICINE

## 2024-10-29 PROCEDURE — 99214 OFFICE O/P EST MOD 30 MIN: CPT | Performed by: FAMILY MEDICINE

## 2024-10-29 PROCEDURE — 99499 UNLISTED E&M SERVICE: CPT | Performed by: FAMILY MEDICINE

## 2024-10-29 NOTE — PROGRESS NOTES
HPI:   Eric Rodriguez is a 69 year old male who presents for recheck of his diabetes, hypertension and dyslipidemia. Patient has  been checking his FBS's at home regularly -- has not checked recently.  Last eye exam -- 7/29/24  Trujeo 40 units in the AM   Novolog -- 16 in AM and 10 at other meals.    Patient presents for recheck of his hypertension. Pt has been taking medications as instructed, no medication side effects, home BP monitoring in the range of 120's systolic and 70-80's diastolic.  His cholesterol is stable. He has been taking his Vytorin as ordered. He denies any side effects of the medications.  Lab work reviewed with the patient.  Also, he is a current every day smoker, he reports he smokes about 1 ppd.   He is aware he needs to quit  Counseling provided and smoking cessation advised. He will consider cessation but not now.  Had echo per Dr. Issa  -- done 4/18/24  Had CT angio on May 22.    Wt Readings from Last 6 Encounters:   10/29/24 234 lb 6 oz (106.3 kg)   09/30/24 231 lb (104.8 kg)   07/29/24 237 lb (107.5 kg)   05/06/24 244 lb (110.7 kg)   04/29/24 245 lb 8 oz (111.4 kg)   12/18/23 246 lb 12.8 oz (111.9 kg)     Body mass index is 33.63 kg/m².     Results for orders placed or performed in visit on 10/24/24   Comp Metabolic Panel (14)    Collection Time: 10/24/24  8:11 AM   Result Value Ref Range    Glucose 152 (H) 70 - 99 mg/dL    Sodium 137 136 - 145 mmol/L    Potassium 4.9 3.5 - 5.1 mmol/L    Chloride 105 98 - 112 mmol/L    CO2 26.0 21.0 - 32.0 mmol/L    Anion Gap 6 0 - 18 mmol/L    BUN 42 (H) 9 - 23 mg/dL    Creatinine 1.43 (H) 0.70 - 1.30 mg/dL    Calcium, Total 10.1 8.7 - 10.4 mg/dL    Calculated Osmolality 297 (H) 275 - 295 mOsm/kg    eGFR-Cr 52 (L) >=60 mL/min/1.73m2    AST 18 <34 U/L    ALT 27 10 - 49 U/L    Alkaline Phosphatase 99 45 - 117 U/L    Bilirubin, Total 0.4 0.2 - 1.1 mg/dL    Total Protein 7.7 5.7 - 8.2 g/dL    Albumin 5.0 (H) 3.2 - 4.8 g/dL    Globulin  2.7 2.0 -  3.5 g/dL    A/G Ratio 1.9 1.0 - 2.0    Patient Fasting for CMP? Yes    Lipid Panel    Collection Time: 10/24/24  8:11 AM   Result Value Ref Range    Cholesterol, Total 138 <200 mg/dL    HDL Cholesterol 42 40 - 59 mg/dL    Triglycerides 201 (H) 30 - 149 mg/dL    LDL Cholesterol 63 <100 mg/dL    VLDL 30 0 - 30 mg/dL    Non HDL Chol 96 <130 mg/dL    Patient Fasting for Lipid? Yes    Hemoglobin A1C    Collection Time: 10/24/24  8:11 AM   Result Value Ref Range    HgbA1C 6.5 (H) <5.7 %    Estimated Average Glucose 140 (H) 68 - 126 mg/dL   Uric Acid    Collection Time: 10/24/24  8:11 AM   Result Value Ref Range    Uric Acid 10.7 (H) 3.7 - 9.2 mg/dL   PSA, Total W Reflex To Free    Collection Time: 10/24/24  8:11 AM   Result Value Ref Range    Prostate Specific Antigen 0.3 0.0 - 4.0 ng/mL    Reflex Criteria Comment        Current Outpatient Medications   Medication Sig Dispense Refill    NOVOLOG FLEXPEN 100 UNIT/ML Subcutaneous Solution Pen-injector INJECT 20 units INTO THE SKIN before each meal 60 mL 1    TOUJEO MAX SOLOSTAR 300 UNIT/ML Subcutaneous Solution Pen-injector Use up to 65 units daily as directed 12 mL 0    EZETIMIBE-SIMVASTATIN 10-40 MG Oral Tab TAKE 1 TABLET BY MOUTH EVERY OTHER DAY 45 tablet 1    OMEGA-3-ACID ETHYL ESTERS 1 g Oral Cap TAKE 2 CAPSULES BY MOUTH TWICE A  capsule 1    dapagliflozin (FARXIGA) 10 MG Oral Tab Take by mouth daily.      finasteride 5 MG Oral Tab Take 1 tablet (5 mg total) by mouth daily. 90 tablet 6    tamsulosin 0.4 MG Oral Cap Take 1 capsule (0.4 mg total) by mouth daily. Take 1/2 hour following the same meal each day 90 capsule 5    Olmesartan Medoxomil 40 MG Oral Tab TAKE 1 TABLET BY MOUTH ONE TIME DAILY 90 tablet 1    metFORMIN HCl 1000 MG Oral Tab Take 1 tablet (1,000 mg total) by mouth 2 (two) times daily with meals. 180 tablet 1    Continuous Glucose Sensor (DEXCOM G7 SENSOR) Does not apply Misc 1 each Every 10 days.      mometasone 0.1 % External Ointment Apply 1  Application topically in the morning and 1 Application before bedtime. 45 g 1    semaglutide 8 MG/3ML Subcutaneous Solution Pen-injector Inject 2 mg into the skin once a week. 9 mL 1    omeprazole 20 MG Oral Capsule Delayed Release Take 1 capsule (20 mg total) by mouth every morning before breakfast.      Fluticasone Propionate 50 MCG/ACT Nasal Suspension 2 sprays by Each Nare route daily as needed for Rhinitis.      Glucose Blood (CONTOUR NEXT TEST) In Vitro Strip 1 each by Other route 4 (four) times daily with meals and nightly. 400 each 3    Tiotropium Bromide Monohydrate (SPIRIVA HANDIHALER) 18 MCG Inhalation Cap Inhale 1 capsule (18 mcg total) into the lungs daily. 90 capsule 1    Insulin Pen Needle (BD PEN NEEDLE SHORT U/F) 31G X 8 MM Does not apply Misc Please disregard previous qty on this. Pt uses up to 4x a day. Pt uses 360 a month~ 400 each 1    Niacin  MG Oral Tab CR Take 1 tablet (500 mg total) by mouth 2 (two) times daily with meals.      Multiple Vitamins-Minerals (CENTRUM SILVER OR) Take by mouth.          Allergies   Allergen Reactions    Dander      dog    Other UNKNOWN    Penicillins DIARRHEA    Tetanus Toxoids UNKNOWN      Past Medical History:    Elizondo's esophagus    Blood in urine    Enlarged badder, heavy for week after catheter inserted    COPD (chronic obstructive pulmonary disease) (HCC)    Diabetes mellitus (HCC)    Disorder of prostate    Turp scheduled for Merlyn 3    Esophageal reflux    Frequent urination    Hemorrhoids    Dr Nieves rubber banded 1.5 years ago    Hiatal hernia    High cholesterol    Painful urination    Sleep apnea    Unspecified essential hypertension    Unspecified gastritis and gastroduodenitis without mention of hemorrhage    Weight loss      Past Surgical History:   Procedure Laterality Date    Colonoscopy  03/30/2007    Colonoscopy  2013    Dr. Gil; due in 5 years    Dental surgery procedure  03/01/2011    Egd  8/2/2019    Other  06/03/2021    TURP     Other surgical history  07/07/2008    Esophagogastroduodenscopy    Other surgical history  06/2010    Surgery for ablation of Elizondo's Esophagus    Other surgical history  01/2010    surgery for granular cell tumor    Other surgical history  2013    EGD; Dr. Gil; due in 2 years    Other surgical history N/A 7/2/2015    Procedure: ESOPHAGOGASTRODUODENOSCOPY (EGD);  Surgeon: Crescencio Gil MD;  Location:  ENDOSCOPY      Social History:   Social History     Socioeconomic History    Marital status:    Tobacco Use    Smoking status: Every Day     Current packs/day: 1.00     Average packs/day: 1 pack/day for 30.0 years (30.0 ttl pk-yrs)     Types: Cigarettes    Smokeless tobacco: Never    Tobacco comments:     1.0 pack weekends, only 4 cigs QD week days   Vaping Use    Vaping status: Never Used   Substance and Sexual Activity    Alcohol use: Not Currently     Alcohol/week: 0.0 standard drinks of alcohol     Comment: 2-3 in the last 3 months    Drug use: No    Sexual activity: Yes   Other Topics Concern    Caffeine Concern Yes     Comment: 3-4 coffee daily    Exercise Yes     Comment: occ     Social Drivers of Health     Physical Activity: Unknown (3/10/2021)    [facility] [employer] [employer]    Exercise Vital Sign     Days of Exercise per Week: 6 days     Exercise:  around the house .  Diet: watches fats closely, watches sugar closely and watches calories closely     REVIEW OF SYSTEMS:   GENERAL HEALTH: feels well otherwise  SKIN: denies any unusual skin lesions or rashes  EYES: denies blurry vision or eye pain  RESPIRATORY: denies shortness of breath with exertion  CARDIOVASCULAR: denies chest pain on exertion  GI: denies abdominal pain and denies nausea or vomitting  NEURO: denies headaches, dizziness, numbness or tingling  MUSCULOSKELETAL: denies any joint aches or pain. Denies any muscle aches or cramps.    EXAM:   /62 (BP Location: Left arm, Patient Position: Sitting,  Cuff Size: adult)   Pulse 84   Resp 16   Ht 5' 10\" (1.778 m)   Wt 234 lb 6 oz (106.3 kg)   SpO2 97%   BMI 33.63 kg/m²   GENERAL: well developed, well nourished,in no apparent distress, pleasant  SKIN: no rashes,no suspicious lesions  NECK: supple,no adenopathy,no bruits; no thyromegaly  LUNGS: clear to auscultation bilaterally; no rhonchi, rales or wheezing  CARDIO: RRR with 2/6 murmur  GI: good BS's,soft, non-tender, nondistended, no masses.  EXTREMITIES: no cyanosis, clubbing or edema; removed shoes and socks  2+ dorsalis pedis pulses bilaterally; skin of the feet examined and intact bilaterally; onychomycosis -- good nail is growing in on his left foot  Nail damage to both toenails while in [place].    ASSESSMENT AND PLAN:   Eric Rodriguez is a 69 year old male who presents for a recheck of his diabetes, HTN, and dyslipidemia.   Diabetic control is good.    Recommendations are: continue present meds, check HgbA1C, check fasting lipids, CMP in 3 months.   Advised to continue to focus on healthier eating and exercise and weight loss. Discussed carbohydrate controlled diet.   Continue to check feet daily.    Cont. Trujeo and novolog.  Cont. With DM center.     Colonoscopy due in 2024.  Hx of TURP. 6/2021  Seeing Dr. Hutchinson.  Seeing Dr. Issa for his heart.      Smoking -- still smoking. 1-1.5 ppd Stable on Spiriva for COPD. Advised strongly to quit smoking.  He will think about it.  Vitamin d def -- stable  Aortic stenosis -- stable  Hx of gout -- Uric acid is stable.  HTN -- stable, continue monitoring BP at home regularly. STOP hydrochlorothiazide since BP Is running low.  COPD -- stable on spiriva  DYSLIPIDEMIA-- Continue Vytorin QOD.  Takes it MWF. Continue working on diet and exercise.  GERD -- stable  Obese -- advised to lose 10 lbs.  Barett's -- sees Dr. Gil  8/2024  Given flu shot.      Encounter Diagnoses   Name Primary?    Type 2 diabetes mellitus with diabetic neuropathy, with long-term  current use of insulin (HCC)     Type 2 diabetes mellitus with obesity (HCC)     Hypertension associated with type 2 diabetes mellitus (HCC) Yes    Dyslipidemia     Coronary artery disease involving native coronary artery of native heart without angina pectoris     Chronic obstructive pulmonary disease, unspecified COPD type (HCC)     Current smoker     Personal history of nicotine dependence     Elizondo's esophagus with dysplasia     Aortic valve calcification     Mitral valve annular calcification     Mild aortic stenosis     History of gout     Diaphragmatic hernia without obstruction and without gangrene     S/P TURP (transurethral resection of prostate)     Vitamin D deficiency     Screening for malignant neoplasm of colon     Medication management     Vaccine counseling     Need for vaccination        Orders Placed This Encounter   Procedures    Comp Metabolic Panel (14)    Lipid Panel    Hemoglobin A1C    Microalb/Creat Ratio, Random Urine    INFLUENZA VAC HIGH DOSE PRSV FREE       Meds & Refills for this Visit:  Requested Prescriptions      No prescriptions requested or ordered in this encounter       Imaging & Consults:  GASTRO - INTERNAL  INFLUENZA VAC HIGH DOSE PRSV FREE    Labs ordered.  Advised to follow up with GI.  Advised Flu shot.  Will get COVID shot.      The patient indicates understanding of these issues and agrees to the plan.  The patient is asked to return in Return in about 3 months (around 1/29/2025) for med check 30.

## 2024-11-10 DIAGNOSIS — Z79.4 TYPE 2 DIABETES MELLITUS WITH HYPERGLYCEMIA, WITH LONG-TERM CURRENT USE OF INSULIN (HCC): ICD-10-CM

## 2024-11-10 DIAGNOSIS — E11.65 TYPE 2 DIABETES MELLITUS WITH HYPERGLYCEMIA, WITH LONG-TERM CURRENT USE OF INSULIN (HCC): ICD-10-CM

## 2024-11-11 NOTE — TELEPHONE ENCOUNTER
Requested Prescriptions     Pending Prescriptions Disp Refills    METFORMIN HCL 1000 MG Oral Tab [Pharmacy Med Name: Metformin Hydrochloride 1,000 Mg Tab Brian] 180 tablet 0     Sig: TAKE ONE TABLET BY MOUTH TWICE A DAY WITH MEALS     Future Appointments   Date Time Provider Department Center   1/13/2025  9:15 AM Cesar Hutchinson MD [id number] [place] [facility]   1/29/2025  9:00 AM Jo Ann Wilson DO [facility] [place]   2/12/2025  8:15 AM Elisa Mata APRN [id number] [place]     Last A1c value was 6.5% done 10/24/2024.  Refill 5/6/24 [facility]   LOV 9/30/24 [facility]

## 2024-12-12 DIAGNOSIS — I10 ESSENTIAL HYPERTENSION: ICD-10-CM

## 2024-12-12 RX ORDER — OLMESARTAN MEDOXOMIL 40 MG/1
TABLET ORAL
Qty: 90 TABLET | Refills: 0 | Status: SHIPPED | OUTPATIENT
Start: 2024-12-12

## 2024-12-12 NOTE — TELEPHONE ENCOUNTER
Last office visit: 10/29/2024   Protocol: pass  Requested medication(s) are due for refill today: yes  Requested medication(s) are on the active medication list same strength, form, dose/ sig: yes  Requested medication(s) are managed by provider: yes  Patient has already received a courtsey refill: no    NOV: 1/29/2025  Last Labs: 10/24/2024  Asked to Return: 2/28/2025

## 2025-01-03 RX ORDER — DAPAGLIFLOZIN 10 MG/1
10 TABLET, FILM COATED ORAL DAILY
Qty: 90 TABLET | Refills: 0 | Status: SHIPPED | OUTPATIENT
Start: 2025-01-03

## 2025-01-03 NOTE — PROGRESS NOTES
HPI:     Eric Rodriguez is a 69 year old male with a PMH of obesity, HTN, HL, DM, GERD, COPD, asthma, CAD, hemorrhoids    Following for:  1. Hypotonic neurogenic bladder with urinary retention requiring CIC  - incidentally found to have asymptomatic urinary retention with b/l hydronephrosis on imaging with > 2 liters out (conteh placed 4/20).   - s/p TURP 6/3/21  - pt had decreased CIC frequency based on post-void cath volumes with intermittent difficulty catheterizing and note to be in retention prior visit  - UDS 12/6/21 with poor sensation and impaired detrusor contractility  - Cysto 12/6/21: very mild prostate tissue regrowth and slight constriction near bladder neck but is wide open overall with no signs of significant obstruction.  - on bethanechol 100 mg BID 12/29/21 but stopped 7/5/23 and was on CIC but stopped in 2024  2. Severe BPH/LUTS  - flomax for years, started proscar 4/20/21  - s/p TURP 6/3/21 (~ 67 g resected, benign)  3. Recurrent UTIs  4. Elevated PSA  5. 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    PCP - Katie Major Surg - [facility]    Presents for check-up. He feels well. No interim UTIs. He is taking 0.4 mg flomax and proscar. Going to [place] [employer] in Feb.  Tries to void every 3-4 h and occasionally more frequent. He stopped catheterizing in summer 2024 - prior visit he was catheterizing ~ once every 2 weeks with ~ 100 mL out.     AUA SS is 5/35 with 2 n; 1 f, w, LOS. Was 20/35 prior to TURP with 5/5 w, LOS; 4/5 f; 3/5 n; 2/5 I; 1/5 u. Happy with LUTS.  Incontinence: none - reported post-void dribbling prior visit    UA is negative and PVR is 102 - recent range  mL for the past several checks.    UCx 8/11/21: < 50 K Staph  UCx 9/2/22: >100 K Claudio    Has ED  and prefers observation.    Drinks ~ 60 oz water per day and light yellow urine - was cloudy in the past. We again discussed continuing to drink enough water to keep urine clear to pale yellow for UTI prevention.  Dr Wilson strongly recommended he increase water intake d/t mild renal insufficiency on recent labs.    Voided 342.9 mL prior to UDS with PVR of 425.  UDS 12/6/21: first sensation at 352 mL. He did not have strong desire even after 598 mL. Procedure stopped. Pt given permission to void and could not with with max detrusor pressure of 25 cm H2O and PVR of 650 mL.    PSA History:  - 0.3 10/24/24  - 0.19 4/23/24  - 0.19 10/27/22  - 0.36 10/22/21  - 3.82 10/8/20  - 3.14 7/18/19  - 4.22 4/19/19  - 2.54 11/9/17  - 2.27 11/29/12    Cr 1.45 4/23/24    UDS 12/6/21: first sensation at 352 mL. He did not have strong desire even after 598 mL. Procedure stopped. Pt given permission to void and could not with with max detrusor pressure of 25 cm H2O and PVR of 650 mL.    CT SP 9/14/21: resolution of b/l hydronephrosis, s/p TURP, stable air in contracted GB suggesting chronic fistula from GB to bowel  RBUS 4/27/21: hydronephrosis has resolved. Reporting b/l stones but I again reviewed CT and no stones noted other than possible vascular calcification on left.  CT SP  4/19/21: markedly distended bladder, moderate to severe BPH with air noted in gallbladder. He has a ~ 5 mm left renal calcification but this may be vascular. No obstructing stones with hydroureter down to the bladder.  RBUS 4/18/21: markedly distended bladder, mod b/l hydro with 8 mm left renal stone  CT chest 4/13/21: partially imaged b/l hydronephrosis.    He has stopped performing CIC, will continue flomax, proscar for BPH/LUTS. Continue annual PSA checks for h/o elevated PSA. Observation for ED. F/u in 6 mo with PVR.    Prior note:  We discussed that UDS is consistent with poor bladder sensation. He has impaired bladder contractility but this is present.  If he continues to have issues catheterizing we could consider cysto with TUR of bladder neck and partial TURP and discussed there is a chance he may empty a bit better. He'd prefer we avoid for now.    Prior note:    I initially saw him with > 10 y h/o worsening LUTS, needing to sit to void and imaging showing b/l hydronephrosis. Placed conteh last visit with > 2 liters out and UA showing moderate blood.    UTI history: once ~ 1 y ago  Gross hematuria: none  Tobacco hx: 30 pack years, current smoker  Kidney stone hx: none    HISTORY:  Past Medical History:    Elizondo's esophagus    Blood in urine    Enlarged badder, heavy for week after catheter inserted    COPD (chronic obstructive pulmonary disease) (HCC)    Diabetes mellitus (HCC)    Disorder of prostate    Turp scheduled for Merlyn 3    Esophageal reflux    Frequent urination    Hemorrhoids    Dr Nieves rubber banded 1.5 years ago    Hiatal hernia    High cholesterol    Painful urination    Sleep apnea    Unspecified essential hypertension    Unspecified gastritis and gastroduodenitis without mention of hemorrhage    Weight loss      Past Surgical History:   Procedure Laterality Date    Colonoscopy  03/30/2007    Colonoscopy  2013    Dr. 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 Gil; due in 2 years    Other surgical history N/A 7/2/2015    Procedure: ESOPHAGOGASTRODUODENOSCOPY (EGD);  Surgeon: Crescencio Gil MD;  Location:  ENDOSCOPY      Family History   Problem Relation Age of Onset    Other (CHF[other]) Mother     Diabetes Mother         Type II    Diabetes Father         Type II    Other (Other[other]) Father     Stroke Father      Other (Amputation[other]) Brother         leg below knee    Diabetes Brother     Cancer Sister         colon    Diabetes Brother         Type II    Other (Diabetic retinopathy[other]) Brother     Cancer Sister         liver    Cancer Sister         ovarian      Social History:   Social History     Socioeconomic History    Marital status:    Tobacco Use    Smoking status: Every Day     Current packs/day: 1.00     Average packs/day: 1 pack/day for 30.0 years (30.0 ttl pk-yrs)     Types: Cigarettes    Smokeless tobacco: Never    Tobacco comments:     1.0 pack weekends, only 4 cigs QD week days   Vaping Use    Vaping status: Never Used   Substance and Sexual Activity    Alcohol use: Not Currently     Alcohol/week: 0.0 standard drinks of alcohol     Comment: 2-3 in the last 3 months    Drug use: No    Sexual activity: Yes   Other Topics Concern    Caffeine Concern Yes     Comment: 3-4 coffee daily    Exercise Yes     Comment: occ     Social Drivers of Health     Physical Activity: Unknown (3/10/2021)    Received from [employer] [facility] [employer] [facility]    Exercise Vital Sign     Days of Exercise per Week: 6 days        Medications (Active prior to today's visit):  Current Outpatient Medications   Medication Sig Dispense Refill    FARXIGA 10 MG Oral Tab TAKE 1 TABLET BY MOUTH ONE TIME DAILY 90 tablet 0    OLMESARTAN MEDOXOMIL 40 MG Oral Tab TAKE 1 TABLET BY MOUTH ONE TIME DAILY 90 tablet 0    METFORMIN HCL 1000 MG Oral Tab TAKE ONE TABLET BY MOUTH TWICE A DAY WITH MEALS 180 tablet 0    NOVOLOG FLEXPEN 100 UNIT/ML Subcutaneous Solution Pen-injector INJECT 20 units INTO THE SKIN before each meal 60 mL 1    TOUJEO MAX SOLOSTAR 300 UNIT/ML Subcutaneous Solution Pen-injector Use up to 65 units daily as directed 12 mL 0    EZETIMIBE-SIMVASTATIN 10-40 MG Oral Tab TAKE 1 TABLET BY MOUTH EVERY OTHER DAY 45 tablet 1    OMEGA-3-ACID ETHYL ESTERS 1 g Oral Cap TAKE 2 CAPSULES BY MOUTH TWICE A  capsule 1     finasteride 5 MG Oral Tab Take 1 tablet (5 mg total) by mouth daily. 90 tablet 6    tamsulosin 0.4 MG Oral Cap Take 1 capsule (0.4 mg total) by mouth daily. Take 1/2 hour following the same meal each day 90 capsule 5    Continuous Glucose Sensor (DEXCOM G7 SENSOR) Does not apply Misc 1 each Every 10 days.      semaglutide 8 MG/3ML Subcutaneous Solution Pen-injector Inject 2 mg into the skin once a week. 9 mL 1    omeprazole 20 MG Oral Capsule Delayed Release Take 1 capsule (20 mg total) by mouth every morning before breakfast.      Glucose Blood (CONTOUR NEXT TEST) In Vitro Strip 1 each by Other route 4 (four) times daily with meals and nightly. 400 each 3    Tiotropium Bromide Monohydrate (SPIRIVA HANDIHALER) 18 MCG Inhalation Cap Inhale 1 capsule (18 mcg total) into the lungs daily. 90 capsule 1    Insulin Pen Needle (BD PEN NEEDLE SHORT U/F) 31G X 8 MM Does not apply Misc Please disregard previous qty on this. Pt uses up to 4x a day. Pt uses 360 a month~ 400 each 1    Niacin  MG Oral Tab CR Take 1 tablet (500 mg total) by mouth 2 (two) times daily with meals.      Multiple Vitamins-Minerals (CENTRUM SILVER OR) Take by mouth.           Allergies:  Allergies   Allergen Reactions    Dander      dog    Other UNKNOWN    Penicillins DIARRHEA    Tetanus Toxoids UNKNOWN         ROS:     A comprehensive 10 point review of systems was completed.  Pertinent positives and negatives noted in the the HPI.    PHYSICAL EXAM:     GENERAL APPEARANCE: well, developed, well nourished, in no acute distress  NEUROLOGIC: nonfocal, alert and oriented  HEAD: normocephalic, atraumatic  EYES: sclera non-icteric  EARS: hearing intact  ORAL CAVITY: mucosa moist  NECK/THYROID: no obvious goiter or masses  LUNGS: nonlabored breathing  ABDOMEN: soft, no obvious masses or tenderness  SKIN: no obvious rashes    : as noted above     ASSESSMENT/PLAN:   Diagnoses and all orders for this visit:    BPH with obstruction/lower urinary tract  symptoms  -     URINALYSIS, AUTO, W/O SCOPE    Hypotonic neurogenic bladder  -     URINALYSIS, AUTO, W/O SCOPE    Recurrent UTI  -     URINALYSIS, AUTO, W/O SCOPE    Bilateral hydronephrosis  -     URINALYSIS, AUTO, W/O SCOPE    Urinary retention  -     URINALYSIS, AUTO, W/O SCOPE    Elevated PSA  -     Cancel: PSA Total, Diagnostic; Future  -     PSA Total, Diagnostic; Future    - as noted above    Thanks again for this consult.    Cesar Hutchinson MD, FACS  Urologist  [facility]  Office: [phone]

## 2025-01-03 NOTE — TELEPHONE ENCOUNTER
Requested Prescriptions     Pending Prescriptions Disp Refills    FARXIGA 10 MG Oral Tab [Pharmacy Med Name: Farxiga 10 Mg Tab Astr] 90 tablet 0     Sig: TAKE 1 TABLET BY MOUTH ONE TIME DAILY     Your appointments       Date & Time Appointment Department ([place])    Jan 13, 2025 9:15 AM CST Follow Up Visit with Cesar Hutchinson MD [facility] ([place] [address])        Feb 12, 2025 8:15 AM CST [place] Diabetic Follow Up with Elisa Mata APRN [facility] ([facility])        Feb 28, 2025 2:30 PM CST Exam - Established with Jo Ann Wilson DO [facility] ([facility])              [facility]  [address]  [facility] [id number] [phone] [place] [address]  [address]  [facility] [id number] [phone] [facility]  [address] [facility] [address]  [facility] [id number] [phone]          Last A1c value was 6.5% done 10/24/2024.    Refill   LOV 9/30/24

## 2025-01-09 ENCOUNTER — TELEPHONE (OUTPATIENT)
Age: 70
End: 2025-01-09

## 2025-01-09 NOTE — TELEPHONE ENCOUNTER
Received fax from [employer] for physician order for G6 supplies sensors/ transmitter and  pt is on G7 from last order sent 05/15/24 calling to verify order are for G7 and not G6     Called [phone] spoke to  informed of supplies listed on form rep states could just write for G7 on there and cross out materials not needed.     Left for G7 sensor for CB to sign attached OV notes faxing too [phone] and bar code to form

## 2025-01-13 ENCOUNTER — OFFICE VISIT (OUTPATIENT)
Dept: SURGERY | Facility: CLINIC | Age: 70
End: 2025-01-13
Payer: MEDICARE

## 2025-01-13 DIAGNOSIS — N31.9 HYPOTONIC NEUROGENIC BLADDER: ICD-10-CM

## 2025-01-13 DIAGNOSIS — N40.1 BPH WITH OBSTRUCTION/LOWER URINARY TRACT SYMPTOMS: Primary | ICD-10-CM

## 2025-01-13 DIAGNOSIS — R97.20 ELEVATED PSA: ICD-10-CM

## 2025-01-13 DIAGNOSIS — N13.8 BPH WITH OBSTRUCTION/LOWER URINARY TRACT SYMPTOMS: Primary | ICD-10-CM

## 2025-01-13 DIAGNOSIS — R33.9 URINARY RETENTION: ICD-10-CM

## 2025-01-13 DIAGNOSIS — N39.0 RECURRENT UTI: ICD-10-CM

## 2025-01-13 DIAGNOSIS — N13.30 BILATERAL HYDRONEPHROSIS: ICD-10-CM

## 2025-01-13 LAB
APPEARANCE: CLEAR
BILIRUBIN: NEGATIVE
GLUCOSE (URINE DIPSTICK): >=1000 MG/DL
KETONES (URINE DIPSTICK): NEGATIVE MG/DL
LEUKOCYTES: NEGATIVE
MULTISTIX LOT#: ABNORMAL NUMERIC
NITRITE, URINE: POSITIVE
OCCULT BLOOD: NEGATIVE
PH, URINE: 5.5 (ref 4.5–8)
PROTEIN (URINE DIPSTICK): NEGATIVE MG/DL
SPECIFIC GRAVITY: 1 (ref 1–1.03)
URINE-COLOR: YELLOW
UROBILINOGEN,SEMI-QN: 0.2 MG/DL (ref 0–1.9)

## 2025-01-13 PROCEDURE — G2211 COMPLEX E/M VISIT ADD ON: HCPCS | Performed by: UROLOGY

## 2025-01-13 PROCEDURE — 99214 OFFICE O/P EST MOD 30 MIN: CPT | Performed by: UROLOGY

## 2025-01-13 PROCEDURE — 81003 URINALYSIS AUTO W/O SCOPE: CPT | Performed by: UROLOGY

## 2025-01-26 DIAGNOSIS — E78.5 DYSLIPIDEMIA: ICD-10-CM

## 2025-01-27 RX ORDER — INSULIN GLARGINE 300 U/ML
INJECTION, SOLUTION SUBCUTANEOUS
Qty: 12 ML | Refills: 0 | Status: SHIPPED | OUTPATIENT
Start: 2025-01-27

## 2025-01-27 RX ORDER — EZETIMIBE AND SIMVASTATIN 10; 40 MG/1; MG/1
1 TABLET ORAL EVERY OTHER DAY
Qty: 45 TABLET | Refills: 1 | Status: SHIPPED | OUTPATIENT
Start: 2025-01-27

## 2025-01-27 NOTE — TELEPHONE ENCOUNTER
Last office visit: 10/29/24   Protocol: pass  Requested medication(s) are due for refill today: yes  Requested medication(s) are on the active medication list same strength, form, dose/ sig: yes  Requested medication(s) are managed by provider: yes  Patient has already received a courtsey refill: no    NOV: 2/28/25    Asked to Return: 1/29/25

## 2025-01-27 NOTE — TELEPHONE ENCOUNTER
Requested Prescriptions     Pending Prescriptions Disp Refills    TOUJEO MAX SOLOSTAR 300 UNIT/ML Subcutaneous Solution Pen-injector [Pharmacy Med Name: Toujeo Max Solostar 300 Unit/Ml Inj Abigail] 12 mL 0     Sig: Use up to 65 units daily as directed     Future Appointments   Date Time Provider Department Center   2/12/2025  8:15 AM Elisa Mata APRN [id number] [place]   2/28/2025  2:30 PM Jo Ann Wilson DO [facility] [place]   7/15/2025  9:15 AM Cesar Hutchinson MD [id number] [place] [facility]     Last A1c value was 6.5% done 10/24/2024.  Refill 10/09/24 Ede   LOV 09/07/24 Ede

## 2025-02-11 NOTE — PROGRESS NOTES
No chief complaint on file.      Eric Rodriguez is a 69 year old presenting for type 2 diabetes management.   Primary care physician: Jo Ann Wilson DO  Last Diabetes appointment with me     Today's A1C 6.9 ( last A1C 6.5%)     Feels increase in trends are from holidays/girl  cookie season.     Has gained some weight   Improved prescription cost w Medicare part D for 2025  Tolerating 0.5mg Ozempic well     In past , due to cost concerns, he is using 2mg ozempic pen and dosing 0.5mg weekly (18- 22  clicks weekly)         Reviewed CGM report/trends w patient today:   Dexcom (full download in media)   GMI 7.0%     Average glucose: 155 mg/dl     Hypoglycemia 0%    TIR 80 %  (ADA recommended goal > 70%)   Variability WNL ( < 33%)   Denies defensive eating  Highest trends after 8pm (admits to [employer] snacks after dinner)         Diabetes History:  Type 2 DM: ~ 2008   Patient has not had hospitalizations for blood sugar issues  denies any history of pancreatitis      Previous DM therapies:  Glyburide ; change in therapy   Janumet : change in therapy   Levemir : 8-2023 : concentrated basal rx       Current DM Regimen:  Metformin 1000mg twice daily     Farxiga 10mg once daily     Ozempic (18 clicks) 0.5mg subcutaneous once weekly (dosing on 2mg pen)    Toujeo max solo star: 40 units once daily in AM   Novolog flex pen:   14 units at breakfast   10 units at dinner       HGBA1C:    Lab Results   Component Value Date    A1C 6.5 (H) 10/24/2024    A1C 6.5 (A) 09/30/2024    A1C 5.9 (H) 07/25/2024     (H) 10/24/2024       Lab Results   Component Value Date    CHOLEST 138 10/24/2024    CHOLEST 150 07/25/2024    TRIG 201 (H) 10/24/2024    TRIG 128 07/25/2024    HDL 42 10/24/2024    HDL 44 07/25/2024    LDL 63 10/24/2024    LDL 83 07/25/2024     Lab Results   Component Value Date    MICROALBCREA 21.7 04/23/2024    MICROALBCREA 68.5 (H) 12/07/2023      Lab Results   Component Value Date    CREATSERUM 1.43  (H) 10/24/2024    CREATSERUM 1.37 (H) 07/25/2024    EGFRCR 52 (L) 10/24/2024    EGFRCR 55 (L) 07/25/2024     Lab Results   Component Value Date    AST 18 10/24/2024    AST 15 07/25/2024    ALT 27 10/24/2024    ALT 19 07/25/2024       Lab Results   Component Value Date    TSH 1.910 04/23/2024    TSH 1.500 02/17/2023           DM Complications:  Microvascular:   Neuropathy: no  Retinopathy: no  Nephropathy: yes    Macrovascular:  PVD: no  CAD: yes Dr Issa . Medical management (no hx CABG or stents)   Stroke/CVA: no        Modifying factors:  Medication adherence: yes   Cost of prescriptions   Recent steroids, illness or infections ( past 3m): no     Allergies: Dander, Other, Penicillins, and Tetanus toxoids    Past Medical History:    Elizondo's esophagus    Blood in urine    Enlarged badder, heavy for week after catheter inserted    COPD (chronic obstructive pulmonary disease) (HCC)    Diabetes mellitus (HCC)    Disorder of prostate    Turp scheduled for Merlyn 3    Esophageal reflux    Frequent urination    Hemorrhoids    Dr Nieves rubber banded 1.5 years ago    Hiatal hernia    High cholesterol    Painful urination    Sleep apnea    Unspecified essential hypertension    Unspecified gastritis and gastroduodenitis without mention of hemorrhage    Weight loss     Past Surgical History:   Procedure Laterality Date    Colonoscopy  03/30/2007    Colonoscopy  2013    Dr. 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 Gil; due in 2 years    Other surgical history N/A 7/2/2015    Procedure: ESOPHAGOGASTRODUODENOSCOPY (EGD);  Surgeon: Crescencio Gil MD;  Location:  ENDOSCOPY     Social History      Socioeconomic History    Marital status:    Tobacco Use    Smoking status: Every Day     Current packs/day: 1.00     Average packs/day: 1 pack/day for 30.0 years (30.0 ttl pk-yrs)     Types: Cigarettes    Smokeless tobacco: Never    Tobacco comments:     1.0 pack weekends, only 4 cigs QD week days   Vaping Use    Vaping status: Never Used   Substance and Sexual Activity    Alcohol use: Not Currently     Alcohol/week: 0.0 standard drinks of alcohol     Comment: 2-3 in the last 3 months    Drug use: No    Sexual activity: Yes   Other Topics Concern    Caffeine Concern Yes     Comment: 3-4 coffee daily    Exercise Yes     Comment: occ     Family History   Problem Relation Age of Onset    Other (CHF[other]) Mother     Diabetes Mother         Type II    Diabetes Father         Type II    Other (Other[other]) Father     Stroke Father     Other (Amputation[other]) Brother         leg below knee    Diabetes Brother     Cancer Sister         colon    Diabetes Brother         Type II    Other (Diabetic retinopathy[other]) Brother     Cancer Sister         liver    Cancer Sister         ovarian     Current Medication List:   Current Outpatient Medications   Medication Sig Dispense Refill    TOUJEO MAX SOLOSTAR 300 UNIT/ML Subcutaneous Solution Pen-injector Use up to 65 units daily as directed 12 mL 0    EZETIMIBE-SIMVASTATIN 10-40 MG Oral Tab TAKE 1 TABLET BY MOUTH EVERY OTHER DAY 45 tablet 1    FARXIGA 10 MG Oral Tab TAKE 1 TABLET BY MOUTH ONE TIME DAILY 90 tablet 0    OLMESARTAN MEDOXOMIL 40 MG Oral Tab TAKE 1 TABLET BY MOUTH ONE TIME DAILY 90 tablet 0    METFORMIN HCL 1000 MG Oral Tab TAKE ONE TABLET BY MOUTH TWICE A DAY WITH MEALS 180 tablet 0    NOVOLOG FLEXPEN 100 UNIT/ML Subcutaneous Solution Pen-injector INJECT 20 units INTO THE SKIN before each meal 60 mL 1    OMEGA-3-ACID ETHYL ESTERS 1 g Oral Cap TAKE 2 CAPSULES BY MOUTH TWICE A  capsule 1    finasteride 5 MG Oral Tab Take 1 tablet (5 mg total) by  mouth daily. 90 tablet 6    tamsulosin 0.4 MG Oral Cap Take 1 capsule (0.4 mg total) by mouth daily. Take 1/2 hour following the same meal each day 90 capsule 5    Continuous Glucose Sensor (DEXCOM G7 SENSOR) Does not apply Misc 1 each Every 10 days.      semaglutide 8 MG/3ML Subcutaneous Solution Pen-injector Inject 2 mg into the skin once a week. 9 mL 1    omeprazole 20 MG Oral Capsule Delayed Release Take 1 capsule (20 mg total) by mouth every morning before breakfast.      Glucose Blood (CONTOUR NEXT TEST) In Vitro Strip 1 each by Other route 4 (four) times daily with meals and nightly. 400 each 3    Tiotropium Bromide Monohydrate (SPIRIVA HANDIHALER) 18 MCG Inhalation Cap Inhale 1 capsule (18 mcg total) into the lungs daily. 90 capsule 1    Insulin Pen Needle (BD PEN NEEDLE SHORT U/F) 31G X 8 MM Does not apply Misc Please disregard previous qty on this. Pt uses up to 4x a day. Pt uses 360 a month~ 400 each 1    Niacin  MG Oral Tab CR Take 1 tablet (500 mg total) by mouth 2 (two) times daily with meals.      Multiple Vitamins-Minerals (CENTRUM SILVER OR) Take by mouth.               DM associated review of  symptoms:   Endocrine: Polyuria, polyphagia, polydipsia: no  Neurological: Paresthesias: yes   HEENT: Blurred vision: no  Skin: no rash or wounds  Hematological: Hypoglycemia: no      Review of Systems     LUNGS: denies shortness of breath   CARDIOVASCULAR: denies chest pain  GI: denies abdominal pain, nausea or diarrhea   : denies dysuria      Physical exam:  There were no vitals taken for this visit.  There is no height or weight on file to calculate BMI.    Physical Exam     Constitutional: Normal appearance   Cardiovascular: Normal rate   Pulmonary/Chest: Effort normal  Neurological: Alert and oriented .   Psychiatric: Normal mood and affect.     Musculoskeletal:  Diabetes foot exam:   Good foot hygiene.   Bilateral barefoot skin diabetic exam: dry skin. No wounds  + great toe mycotic nails.    Visualized feet and the appearance : normal.  Bilateral monofilament/sensation of both feet is normal. Vibration to dorsum to the first toe perceived.   Bilateral 2+ pedal pulse pedal pulse exam       Assessment/Plan:      Dyslipidemia:   Last  labs  --> active orders from PCP   Needs ongoing monitoring   Continue vytorin 10-40mg  rx     CKD stage 3 a    labs ; stable   Monitor Q 6 m     Continue arb, Farxiga and GLP 1 (declines dose increase due to cost)     Obesity   Weight 233  lb ( last weight:   231  lb )   Weight is increased.    Increase Ozempic dose--> see DM     CAD   Per ADA standards of care, guidelines, in patients with type 2 diabetes and established ASCVD or indicators of high CV risk dsease,  incorporating agents proven to reduce major adverse CV events and CV mortality is indicated  Continue glp1 rx  and SGLT2i - see DM          Type 2 diabetes mellitus with hyperglycemia, with long-term current use of insulin ([facility])  A1C: 6.9  % ( last A1C 6.5%)   Weight: 233 lb ( last weight: 231 lb, 246  lb )     Diabetes control is increased  ; needs ongoing management and evaluation  given the chronicity of Diabetes, ongoing medication monitoring and risk for complications     Increase Ozempic to 1.0 mg subcutaneous once weekly (dose 36 clicks on 2.0 mg Ozempic pen)       Continue    Toujeo max 40  units once daily   Novolog flex pen: 14  units   w breakfast  Dinner: 10 units     Dexcom G7 ([employer] )   Metformin 1000mg twice daily      Farxiga 10mg once daily   ~reminded to stay hydrated and watch for s/s of uti or yeast infections       Reviewed  clinical significance of A1c, adverse effects of suboptimal glucose control, and goals of therapy   Reviewed the A1C test, what the value reflects and the goal for the patient.   Reminded pt on A1C and blood sugar targets (Fasting < 130 and post prandial <180 ) and complications associated with hyperglycemia and uncontrolled DM (on AVS)    Recommended SMBG 2- x daily if not using CGM   Reviewed s/s and treatment of hypoglycemia (on AVS)   Cost barrier w Glucagon - pt has low alert on Dexcom at 70 mg/dl   Continue with lifestyle modifications since they have positive impact on diabetes/blood sugars/health (portion control, physical activity, weight loss)   Reinforced timing and adherence with medication, self-monitoring of blood glucose and routine follow up  Follow up with me 4-5  m  but recommended to contact DM clinic sooner if questions or concerns.    The patient indicates understanding of these issues and agrees to the plan.      No orders of the defined types were placed in this encounter.    Diabetes complications & risks surveillance:   A1C/Blood pressure: as reported   Nephropathy screening:   No ace/arb; (low BP)   LIPID screening:   . +  statin rx.   Last dilated eye exam: Last Dilated Eye Exam: 07/29/24   Exam shows retinopathy? Eye Exam shows Diabetic Retinopathy?: No  Last diabetic foot exam: Last Foot Exam: 04/29/24    Declined urine collection today - having labs w pcp

## 2025-02-12 ENCOUNTER — OFFICE VISIT (OUTPATIENT)
Dept: ENDOCRINOLOGY CLINIC | Facility: CLINIC | Age: 70
End: 2025-02-12
Payer: MEDICARE

## 2025-02-12 VITALS
SYSTOLIC BLOOD PRESSURE: 112 MMHG | HEART RATE: 91 BPM | OXYGEN SATURATION: 96 % | WEIGHT: 233.38 LBS | RESPIRATION RATE: 17 BRPM | BODY MASS INDEX: 33 KG/M2 | DIASTOLIC BLOOD PRESSURE: 62 MMHG

## 2025-02-12 DIAGNOSIS — N18.31 CKD STAGE 3A, GFR 45-59 ML/MIN (HCC): ICD-10-CM

## 2025-02-12 DIAGNOSIS — E11.69 DYSLIPIDEMIA ASSOCIATED WITH TYPE 2 DIABETES MELLITUS (HCC): ICD-10-CM

## 2025-02-12 DIAGNOSIS — E66.09 CLASS 1 OBESITY DUE TO EXCESS CALORIES WITHOUT SERIOUS COMORBIDITY WITH BODY MASS INDEX (BMI) OF 33.0 TO 33.9 IN ADULT: ICD-10-CM

## 2025-02-12 DIAGNOSIS — I25.10 CORONARY ARTERY DISEASE INVOLVING NATIVE CORONARY ARTERY OF NATIVE HEART WITHOUT ANGINA PECTORIS: ICD-10-CM

## 2025-02-12 DIAGNOSIS — E78.5 DYSLIPIDEMIA ASSOCIATED WITH TYPE 2 DIABETES MELLITUS (HCC): ICD-10-CM

## 2025-02-12 DIAGNOSIS — E11.65 TYPE 2 DIABETES MELLITUS WITH HYPERGLYCEMIA, WITH LONG-TERM CURRENT USE OF INSULIN (HCC): Primary | ICD-10-CM

## 2025-02-12 DIAGNOSIS — E66.811 CLASS 1 OBESITY DUE TO EXCESS CALORIES WITHOUT SERIOUS COMORBIDITY WITH BODY MASS INDEX (BMI) OF 33.0 TO 33.9 IN ADULT: ICD-10-CM

## 2025-02-12 DIAGNOSIS — Z79.4 TYPE 2 DIABETES MELLITUS WITH HYPERGLYCEMIA, WITH LONG-TERM CURRENT USE OF INSULIN (HCC): Primary | ICD-10-CM

## 2025-02-12 LAB — HEMOGLOBIN A1C: 6.9 % (ref 4.3–5.6)

## 2025-02-12 RX ORDER — DAPAGLIFLOZIN 10 MG/1
10 TABLET, FILM COATED ORAL DAILY
Qty: 90 TABLET | Refills: 1 | Status: SHIPPED | OUTPATIENT
Start: 2025-02-12

## 2025-02-12 RX ORDER — ACYCLOVIR 400 MG/1
1 TABLET ORAL
COMMUNITY
Start: 2025-02-12

## 2025-02-12 NOTE — PATIENT INSTRUCTIONS
A1C 6.9% ( last A1C 6.5%)     Watch the 8 pm evening snacks - since we are seeing some spikes at this time and before bed       Increase Ozempic to 36 clicks (= 1mg weekly dose)     If you notice lower trends or having to eat to avoid having low blood sugars, contact me         Continue      Toujeo max 40  units once daily     Novolog flex pen: 14  units   w breakfast    Dinner: 10 units     Dexcom G7   Metformin 1000mg twice daily      Farxiga 10mg once daily     Blood sugar targets:     Fasting blood sugar (before breakfast) Target:      2 hours after eating less than 180     Call for blood sugars less than  75 or greater than  200 more than 2 times in a week     If you are wearing the sensor, please look at your trends/averages    Recommendations:   GMI (estimated A1C ) target under  7.5%     Time in range (healthy blood sugar targets) : goal is over 70%   less than 70 : goal is less than 2%       Watch for low blood sugars: (less than 70 )    Treatment of Low Blood Glucose Action Plan  1. Check blood glucose to be sure that it is low. You cannot  always go by symptoms or how you feel. If in doubt, treat your low blood glucose anyway.  Rule of 15 :     2. Take 15 grams of carbohydrate (carb). Here are some choices:    4 oz. regular fruit juice  3-4 glucose tablets  6 oz. regular soda   7-8 jelly beans    3. Recheck blood glucose after 10-15 minutes. If blood glucose is still low (less than 70 mg/dl) repeat the treatment (step 2).    4. If your next meal is more than one hour away, eat a small snack.    5. If you’re not sure what caused your low blood glucose, call your healthcare provider.    6. Always check your blood glucose before you drive       To treat a low, I recommend you carry with you easy, pre-portioned treatment for low blood sugars that are 15G of carbs:   - Children sized squeeze pouch applesauce (high fiber + carbs help prevent too high of a spike)  - Small children's sized juicebox- 15g  carb --> 4oz juice box  - Glucose tablets from [employer], you can find them near diabetes supplies --> Note, you will need to eat 3-4 tablets to get to 15g of carbs  - Children sized fruit snack pack- look for one with 15 grams of total carbohydrate    AVOID complex carbs, or foods that contain fats along with carbs (like chocolate) can slow the absorption of glucose and should NOT be used to treat an emergency low

## 2025-02-12 NOTE — PROGRESS NOTES
-----------------------------  Dexcom Clarity  -----------------------------  Eric Rodriguez    YOB: 1955    Generated at: Wed, Feb 12, 2025 7:09 AM CST    Reporting period: Tue Jan 14, 2025 - Wed Feb 12, 2025  -----------------------------  Glucose Details    Average glucose: 155 mg/dL    GMI: 7.0%    Standard deviation: 39 mg/dL    Coefficient of Variation: 25.3%  -----------------------------  Time in Range    Very High: 3%    High: 17%    In Range: 80%    Low: 0%    Very Low: <1%    Target Range   mg/dL    -----------------------------  Sensor usage    Days with data: 26/30    Time active: 88%    Avg. calibrations per day: 0.0

## 2025-02-18 DIAGNOSIS — E11.65 TYPE 2 DIABETES MELLITUS WITH HYPERGLYCEMIA, WITH LONG-TERM CURRENT USE OF INSULIN (HCC): ICD-10-CM

## 2025-02-18 DIAGNOSIS — Z79.4 TYPE 2 DIABETES MELLITUS WITH HYPERGLYCEMIA, WITH LONG-TERM CURRENT USE OF INSULIN (HCC): ICD-10-CM

## 2025-02-18 NOTE — TELEPHONE ENCOUNTER
Requested Prescriptions     Pending Prescriptions Disp Refills    METFORMIN HCL 1000 MG Oral Tab [Pharmacy Med Name: Metformin Hydrochloride 1,000 Mg Tab Brian] 180 tablet 0     Sig: TAKE ONE TABLET BY MOUTH TWICE A DAY WITH MEALS     HGBA1C:    Lab Results   Component Value Date    A1C 6.9 (A) 02/12/2025    A1C 6.5 (H) 10/24/2024    A1C 6.5 (A) 09/30/2024     (H) 10/24/2024     Your Appointments      Friday February 28, 2025 2:30 PM  Exam - Established with Jo Ann Wilson DO  [facility], [facility] ([facility]) [address]  [facility] [id number] [phone]        Wednesday July 09, 2025 7:30 AM  Diabetes Pump follow up with DEACON Hassan  [facility] ([facility]) [address]  [facility] [id number] [phone]        Tuesday July 15, 2025 9:15 AM  Follow Up Visit with Cesar Hutchinson MD  [facility], [facility] ([place] [id number]) [address]  [facility] [id number] [phone]             Last OFFICE VISIT: 2--CB    Last refill:   11--180 tabs with 0 refills -CB  
details…

## 2025-02-25 ENCOUNTER — LAB ENCOUNTER (OUTPATIENT)
Dept: LAB | Age: 70
End: 2025-02-25
Attending: FAMILY MEDICINE
Payer: MEDICARE

## 2025-02-25 DIAGNOSIS — E66.9 TYPE 2 DIABETES MELLITUS WITH OBESITY (HCC): ICD-10-CM

## 2025-02-25 DIAGNOSIS — E11.65 TYPE 2 DIABETES MELLITUS WITH HYPERGLYCEMIA, WITH LONG-TERM CURRENT USE OF INSULIN (HCC): ICD-10-CM

## 2025-02-25 DIAGNOSIS — R97.20 ELEVATED PSA: ICD-10-CM

## 2025-02-25 DIAGNOSIS — Z79.4 TYPE 2 DIABETES MELLITUS WITH HYPERGLYCEMIA, WITH LONG-TERM CURRENT USE OF INSULIN (HCC): ICD-10-CM

## 2025-02-25 DIAGNOSIS — E11.69 TYPE 2 DIABETES MELLITUS WITH OBESITY (HCC): ICD-10-CM

## 2025-02-25 DIAGNOSIS — E78.5 DYSLIPIDEMIA: ICD-10-CM

## 2025-02-25 DIAGNOSIS — E11.40 TYPE 2 DIABETES MELLITUS WITH DIABETIC NEUROPATHY, WITH LONG-TERM CURRENT USE OF INSULIN (HCC): ICD-10-CM

## 2025-02-25 DIAGNOSIS — Z79.4 TYPE 2 DIABETES MELLITUS WITH DIABETIC NEUROPATHY, WITH LONG-TERM CURRENT USE OF INSULIN (HCC): ICD-10-CM

## 2025-02-25 LAB
ALBUMIN SERPL-MCNC: 4.9 G/DL (ref 3.2–4.8)
ALBUMIN/GLOB SERPL: 1.8 {RATIO} (ref 1–2)
ALP LIVER SERPL-CCNC: 91 U/L
ALT SERPL-CCNC: 26 U/L
ANION GAP SERPL CALC-SCNC: 7 MMOL/L (ref 0–18)
AST SERPL-CCNC: 18 U/L (ref ?–34)
BILIRUB SERPL-MCNC: 0.4 MG/DL (ref 0.2–1.1)
BUN BLD-MCNC: 19 MG/DL (ref 9–23)
CALCIUM BLD-MCNC: 9.6 MG/DL (ref 8.7–10.6)
CHLORIDE SERPL-SCNC: 101 MMOL/L (ref 98–112)
CHOLEST SERPL-MCNC: 138 MG/DL (ref ?–200)
CO2 SERPL-SCNC: 28 MMOL/L (ref 21–32)
CREAT BLD-MCNC: 1.45 MG/DL
CREAT UR-SCNC: 22 MG/DL
EGFRCR SERPLBLD CKD-EPI 2021: 52 ML/MIN/1.73M2 (ref 60–?)
EST. AVERAGE GLUCOSE BLD GHB EST-MCNC: 148 MG/DL (ref 68–126)
FASTING PATIENT LIPID ANSWER: YES
FASTING STATUS PATIENT QL REPORTED: YES
GLOBULIN PLAS-MCNC: 2.8 G/DL (ref 2–3.5)
GLUCOSE BLD-MCNC: 151 MG/DL (ref 70–99)
HBA1C MFR BLD: 6.8 % (ref ?–5.7)
HDLC SERPL-MCNC: 46 MG/DL (ref 40–59)
LDLC SERPL CALC-MCNC: 70 MG/DL (ref ?–100)
MICROALBUMIN UR-MCNC: 0.7 MG/DL
MICROALBUMIN/CREAT 24H UR-RTO: 31.8 UG/MG (ref ?–30)
NONHDLC SERPL-MCNC: 92 MG/DL (ref ?–130)
OSMOLALITY SERPL CALC.SUM OF ELEC: 287 MOSM/KG (ref 275–295)
POTASSIUM SERPL-SCNC: 4.8 MMOL/L (ref 3.5–5.1)
PROT SERPL-MCNC: 7.7 G/DL (ref 5.7–8.2)
PSA SERPL-MCNC: 0.26 NG/ML (ref ?–4)
SODIUM SERPL-SCNC: 136 MMOL/L (ref 136–145)
TRIGL SERPL-MCNC: 123 MG/DL (ref 30–149)
VLDLC SERPL CALC-MCNC: 19 MG/DL (ref 0–30)

## 2025-02-25 PROCEDURE — 83036 HEMOGLOBIN GLYCOSYLATED A1C: CPT

## 2025-02-25 PROCEDURE — 84153 ASSAY OF PSA TOTAL: CPT

## 2025-02-25 PROCEDURE — 36415 COLL VENOUS BLD VENIPUNCTURE: CPT

## 2025-02-25 PROCEDURE — 80061 LIPID PANEL: CPT

## 2025-02-25 PROCEDURE — 82043 UR ALBUMIN QUANTITATIVE: CPT

## 2025-02-25 PROCEDURE — 82570 ASSAY OF URINE CREATININE: CPT

## 2025-02-25 PROCEDURE — 80053 COMPREHEN METABOLIC PANEL: CPT

## 2025-02-28 ENCOUNTER — OFFICE VISIT (OUTPATIENT)
Dept: FAMILY MEDICINE CLINIC | Facility: CLINIC | Age: 70
End: 2025-02-28
Payer: MEDICARE

## 2025-02-28 VITALS
BODY MASS INDEX: 32.66 KG/M2 | DIASTOLIC BLOOD PRESSURE: 70 MMHG | WEIGHT: 228.13 LBS | HEART RATE: 83 BPM | HEIGHT: 70 IN | OXYGEN SATURATION: 99 % | RESPIRATION RATE: 16 BRPM | SYSTOLIC BLOOD PRESSURE: 114 MMHG

## 2025-02-28 DIAGNOSIS — Z79.4 TYPE 2 DIABETES MELLITUS WITH DIABETIC NEUROPATHY, WITH LONG-TERM CURRENT USE OF INSULIN (HCC): ICD-10-CM

## 2025-02-28 DIAGNOSIS — I15.2 HYPERTENSION ASSOCIATED WITH TYPE 2 DIABETES MELLITUS (HCC): Primary | ICD-10-CM

## 2025-02-28 DIAGNOSIS — F17.210 SMOKING GREATER THAN 40 PACK YEARS: ICD-10-CM

## 2025-02-28 DIAGNOSIS — Z71.85 VACCINE COUNSELING: ICD-10-CM

## 2025-02-28 DIAGNOSIS — K22.719 BARRETT'S ESOPHAGUS WITH DYSPLASIA: ICD-10-CM

## 2025-02-28 DIAGNOSIS — F17.200 CURRENT SMOKER: ICD-10-CM

## 2025-02-28 DIAGNOSIS — E66.9 TYPE 2 DIABETES MELLITUS WITH OBESITY (HCC): ICD-10-CM

## 2025-02-28 DIAGNOSIS — I15.2 HYPERTENSION ASSOCIATED WITH DIABETES (HCC): ICD-10-CM

## 2025-02-28 DIAGNOSIS — E08.22 DIABETES MELLITUS DUE TO UNDERLYING CONDITION WITH STAGE 3A CHRONIC KIDNEY DISEASE, WITH LONG-TERM CURRENT USE OF INSULIN (HCC): ICD-10-CM

## 2025-02-28 DIAGNOSIS — J44.9 CHRONIC OBSTRUCTIVE PULMONARY DISEASE, UNSPECIFIED COPD TYPE (HCC): ICD-10-CM

## 2025-02-28 DIAGNOSIS — Z79.899 MEDICATION MANAGEMENT: ICD-10-CM

## 2025-02-28 DIAGNOSIS — F17.210 CIGARETTE SMOKER: ICD-10-CM

## 2025-02-28 DIAGNOSIS — I25.10 CORONARY ARTERY DISEASE INVOLVING NATIVE CORONARY ARTERY OF NATIVE HEART WITHOUT ANGINA PECTORIS: ICD-10-CM

## 2025-02-28 DIAGNOSIS — E78.5 DYSLIPIDEMIA: ICD-10-CM

## 2025-02-28 DIAGNOSIS — E11.69 TYPE 2 DIABETES MELLITUS WITH OBESITY (HCC): ICD-10-CM

## 2025-02-28 DIAGNOSIS — Z79.4 DIABETES MELLITUS DUE TO UNDERLYING CONDITION WITH STAGE 3A CHRONIC KIDNEY DISEASE, WITH LONG-TERM CURRENT USE OF INSULIN (HCC): ICD-10-CM

## 2025-02-28 DIAGNOSIS — E11.59 HYPERTENSION ASSOCIATED WITH DIABETES (HCC): ICD-10-CM

## 2025-02-28 DIAGNOSIS — E11.59 HYPERTENSION ASSOCIATED WITH TYPE 2 DIABETES MELLITUS (HCC): Primary | ICD-10-CM

## 2025-02-28 DIAGNOSIS — N18.31 DIABETES MELLITUS DUE TO UNDERLYING CONDITION WITH STAGE 3A CHRONIC KIDNEY DISEASE, WITH LONG-TERM CURRENT USE OF INSULIN (HCC): ICD-10-CM

## 2025-02-28 DIAGNOSIS — Z90.79 S/P TURP (TRANSURETHRAL RESECTION OF PROSTATE): ICD-10-CM

## 2025-02-28 DIAGNOSIS — Z87.891 PERSONAL HISTORY OF NICOTINE DEPENDENCE: ICD-10-CM

## 2025-02-28 DIAGNOSIS — E55.9 VITAMIN D DEFICIENCY: ICD-10-CM

## 2025-02-28 DIAGNOSIS — E11.40 TYPE 2 DIABETES MELLITUS WITH DIABETIC NEUROPATHY, WITH LONG-TERM CURRENT USE OF INSULIN (HCC): ICD-10-CM

## 2025-02-28 NOTE — PROGRESS NOTES
HPI:   Eric Rodriguez is a 69 year old male who presents for recheck of his diabetes, hypertension and dyslipidemia. Patient has  been checking his FBS's at home regularly -- has not checked recently.  Last eye exam -- 7/29/24  Dr. Gil -- 3/2025; due in 8/2025 for colonoscopy and ? EGD  Dr. Issa -- 3/2025    Patient presents for recheck of his hypertension. Pt has been taking medications as instructed, no medication side effects, home BP monitoring in the range of 120's systolic and 70-80's diastolic.  Off hydrochlorothiazide and doing well.    His cholesterol is stable. He has been taking his Vytorin as ordered. He denies any side effects of the medications.  Lab work reviewed with the patient.  Also, he is a current every day smoker, he reports he smokes about 1 ppd.   He is aware he needs to quit  Counseling provided and smoking cessation advised. He will consider cessation but not now.        Wt Readings from Last 6 Encounters:   02/28/25 228 lb 2 oz (103.5 kg)   02/12/25 233 lb 6.4 oz (105.9 kg)   10/29/24 234 lb 6 oz (106.3 kg)   09/30/24 231 lb (104.8 kg)   07/29/24 237 lb (107.5 kg)   05/06/24 244 lb (110.7 kg)     Body mass index is 32.73 kg/m².     Results for orders placed or performed in visit on 02/25/25   Comp Metabolic Panel (14)    Collection Time: 02/25/25  8:02 AM   Result Value Ref Range    Glucose 151 (H) 70 - 99 mg/dL    Sodium 136 136 - 145 mmol/L    Potassium 4.8 3.5 - 5.1 mmol/L    Chloride 101 98 - 112 mmol/L    CO2 28.0 21.0 - 32.0 mmol/L    Anion Gap 7 0 - 18 mmol/L    BUN 19 9 - 23 mg/dL    Creatinine 1.45 (H) 0.70 - 1.30 mg/dL    Calcium, Total 9.6 8.7 - 10.6 mg/dL    Calculated Osmolality 287 275 - 295 mOsm/kg    eGFR-Cr 52 (L) >=60 mL/min/1.73m2    AST 18 <34 U/L    ALT 26 10 - 49 U/L    Alkaline Phosphatase 91 45 - 117 U/L    Bilirubin, Total 0.4 0.2 - 1.1 mg/dL    Total Protein 7.7 5.7 - 8.2 g/dL    Albumin 4.9 (H) 3.2 - 4.8 g/dL    Globulin  2.8 2.0 - 3.5 g/dL    A/G  Ratio 1.8 1.0 - 2.0    Patient Fasting for CMP? Yes    Lipid Panel    Collection Time: 02/25/25  8:02 AM   Result Value Ref Range    Cholesterol, Total 138 <200 mg/dL    HDL Cholesterol 46 40 - 59 mg/dL    Triglycerides 123 30 - 149 mg/dL    LDL Cholesterol 70 <100 mg/dL    VLDL 19 0 - 30 mg/dL    Non HDL Chol 92 <130 mg/dL    Patient Fasting for Lipid? Yes    Hemoglobin A1C    Collection Time: 02/25/25  8:02 AM   Result Value Ref Range    HgbA1C 6.8 (H) <5.7 %    Estimated Average Glucose 148 (H) 68 - 126 mg/dL   Microalb/Creat Ratio, Random Urine    Collection Time: 02/25/25  8:02 AM   Result Value Ref Range    Microalbumin, Urine 0.70 mg/dL    Creatinine Ur Random 22.00 mg/dL    Malb/Cre Calc 31.8 (H) <=30.0 ug/mg   PSA Total, Diagnostic    Collection Time: 02/25/25  8:02 AM   Result Value Ref Range    Total PSA  0.26 <=4.00 ng/mL       Current Outpatient Medications   Medication Sig Dispense Refill    METFORMIN HCL 1000 MG Oral Tab TAKE ONE TABLET BY MOUTH TWICE A DAY WITH MEALS 180 tablet 0    FARXIGA 10 MG Oral Tab Take 1 tablet (10 mg total) by mouth daily. 90 tablet 1    Continuous Glucose Sensor (DEXCOM G7 SENSOR) Does not apply Misc 1 each Every 10 days.      TOUJEO MAX SOLOSTAR 300 UNIT/ML Subcutaneous Solution Pen-injector Use up to 65 units daily as directed (Patient taking differently: Use up to 65 units daily as directed  On 35 units daily 2/28/25) 12 mL 0    EZETIMIBE-SIMVASTATIN 10-40 MG Oral Tab TAKE 1 TABLET BY MOUTH EVERY OTHER DAY 45 tablet 1    OLMESARTAN MEDOXOMIL 40 MG Oral Tab TAKE 1 TABLET BY MOUTH ONE TIME DAILY 90 tablet 0    NOVOLOG FLEXPEN 100 UNIT/ML Subcutaneous Solution Pen-injector INJECT 20 units INTO THE SKIN before each meal (Patient taking differently: INJECT 20 units INTO THE SKIN before each meal  10 in AM and other meals 8 for other meals 2/28/25) 60 mL 1    OMEGA-3-ACID ETHYL ESTERS 1 g Oral Cap TAKE 2 CAPSULES BY MOUTH TWICE A  capsule 1    finasteride 5 MG Oral Tab  Take 1 tablet (5 mg total) by mouth daily. 90 tablet 6    tamsulosin 0.4 MG Oral Cap Take 1 capsule (0.4 mg total) by mouth daily. Take 1/2 hour following the same meal each day 90 capsule 5    semaglutide 8 MG/3ML Subcutaneous Solution Pen-injector Inject 2 mg into the skin once a week. (Patient taking differently: Inject 2 mg into the skin once a week. 36 clicks -- 0.5 mg weekly 2/28/25) 9 mL 1    omeprazole 20 MG Oral Capsule Delayed Release Take 1 capsule (20 mg total) by mouth every morning before breakfast.      Glucose Blood (CONTOUR NEXT TEST) In Vitro Strip 1 each by Other route 4 (four) times daily with meals and nightly. 400 each 3    Tiotropium Bromide Monohydrate (SPIRIVA HANDIHALER) 18 MCG Inhalation Cap Inhale 1 capsule (18 mcg total) into the lungs daily. 90 capsule 1    Insulin Pen Needle (BD PEN NEEDLE SHORT U/F) 31G X 8 MM Does not apply Misc Please disregard previous qty on this. Pt uses up to 4x a day. Pt uses 360 a month~ 400 each 1    Niacin  MG Oral Tab CR Take 1 tablet (500 mg total) by mouth 2 (two) times daily with meals.      Multiple Vitamins-Minerals (CENTRUM SILVER OR) Take by mouth.          Allergies   Allergen Reactions    Dander      dog    Other UNKNOWN    Penicillins DIARRHEA    Tetanus Toxoids UNKNOWN      Past Medical History:    Elizondo's esophagus    Blood in urine    Enlarged badder, heavy for week after catheter inserted    COPD (chronic obstructive pulmonary disease) (HCC)    Diabetes mellitus (HCC)    Disorder of prostate    Turp scheduled for Merlyn 3    Esophageal reflux    Frequent urination    Hemorrhoids    Dr Nieves rubber banded 1.5 years ago    Hiatal hernia    High cholesterol    Painful urination    Sleep apnea    Unspecified essential hypertension    Unspecified gastritis and gastroduodenitis without mention of hemorrhage    Weight loss      Past Surgical History:   Procedure Laterality Date    Colonoscopy  03/30/2007    Colonoscopy  2013    Dr. Gil; due  in 5 years    Dental surgery procedure  03/01/2011    Egd  8/2/2019    Other  06/03/2021    TURP    Other surgical history  07/07/2008    Esophagogastroduodenscopy    Other surgical history  06/2010    Surgery for ablation of Elizondo's Esophagus    Other surgical history  01/2010    surgery for granular cell tumor    Other surgical history  2013    EGD; Dr. iGl; due in 2 years    Other surgical history N/A 7/2/2015    Procedure: ESOPHAGOGASTRODUODENOSCOPY (EGD);  Surgeon: Crescencio Gil MD;  Location:  ENDOSCOPY      Social History:   Social History     Socioeconomic History    Marital status:    Tobacco Use    Smoking status: Every Day     Current packs/day: 1.00     Average packs/day: 1 pack/day for 30.0 years (30.0 ttl pk-yrs)     Types: Cigarettes    Smokeless tobacco: Never    Tobacco comments:     1.0 pack weekends, only 4 cigs QD week days   Vaping Use    Vaping status: Never Used   Substance and Sexual Activity    Alcohol use: Not Currently     Alcohol/week: 0.0 standard drinks of alcohol     Comment: 2-3 in the last 3 months    Drug use: No    Sexual activity: Yes   Other Topics Concern    Caffeine Concern Yes     Comment: 3-4 coffee daily    Exercise Yes     Comment: occ     Exercise:  around the house .  Diet: watches fats closely, watches sugar closely and watches calories closely     REVIEW OF SYSTEMS:   GENERAL HEALTH: feels well otherwise  SKIN: denies any unusual skin lesions or rashes  EYES: denies blurry vision or eye pain  RESPIRATORY: denies shortness of breath with exertion  CARDIOVASCULAR: denies chest pain on exertion  GI: denies abdominal pain and denies nausea or vomitting  NEURO: denies headaches, dizziness, numbness or tingling  MUSCULOSKELETAL: denies any joint aches or pain. Denies any muscle aches or cramps.    EXAM:   /70 (BP Location: Left arm, Patient Position: Sitting, Cuff Size: large)   Pulse 83   Resp 16   Ht 5' 10\" (1.778 m)   Wt 228 lb 2 oz (103.5 kg)    SpO2 99%   BMI 32.73 kg/m²   GENERAL: well developed, well nourished,in no apparent distress, pleasant  SKIN: no rashes,no suspicious lesions  NECK: supple,no adenopathy,no bruits; no thyromegaly  LUNGS: clear to auscultation bilaterally; no rhonchi, rales or wheezing  CARDIO: RRR with 2/6 murmur  GI: good BS's,soft, non-tender, nondistended, no masses.  EXTREMITIES: no cyanosis, clubbing or edema      ASSESSMENT AND PLAN:   Eric Rodriguez is a 69 year old male who presents for a recheck of his diabetes, HTN, and dyslipidemia.   Diabetic control is good.    Recommendations are: continue present meds, check HgbA1C, check fasting lipids, CMP in 3 months.   Advised to continue to focus on healthier eating and exercise and weight loss. Discussed carbohydrate controlled diet.   Continue to check feet daily.    Cont. Trujeo and novolog.  On ozempic.  Cont. With DM center. El[facility].    Colonoscopy due in 2024.  Hx of TURP. 6/2021  Seeing Dr. Hutchinson.  Seeing Dr. Issa for his heart.      Smoking -- still smoking. 1-1.5 ppd Stable on Spiriva for COPD. Advised strongly to quit smoking.  He will think about it.  Vitamin d def -- stable  Aortic stenosis -- stable  Hx of gout -- Uric acid is stable.  HTN -- stable, continue monitoring BP at home regularly. Stopped HCTZ  COPD -- stable on spiriva  DYSLIPIDEMIA-- Continue Vytorin QOD.  Takes it MWF. Continue working on diet and exercise.  GERD -- stable  Obese -- advised to lose 10 lbs.  Barett's -- sees Dr. Gil  8/2025        Encounter Diagnoses   Name Primary?    Hypertension associated with type 2 diabetes mellitus (HCC) Yes    Type 2 diabetes mellitus with obesity (HCC)     Type 2 diabetes mellitus with diabetic neuropathy, with long-term current use of insulin (HCC)     Diabetes mellitus due to underlying condition with stage 3a chronic kidney disease, with long-term current use of insulin (HCC)     Smoking greater than 40 pack years     Cigarette smoker      Hypertension associated with diabetes (HCC)     Dyslipidemia     Coronary artery disease involving native coronary artery of native heart without angina pectoris     Chronic obstructive pulmonary disease, unspecified COPD type (HCC)     Current smoker     Personal history of nicotine dependence     Elizondo's esophagus with dysplasia     S/P TURP (transurethral resection of prostate)     Vitamin D deficiency     Medication management     Vaccine counseling          Orders Placed This Encounter   Procedures    CBC With Differential With Platelet    Comp Metabolic Panel (14)    TSH W Reflex To Free T4    Lipid Panel    Vitamin D, 25-Hydroxy    Hemoglobin A1C    Microalb/Creat Ratio, Random Urine    Urinalysis, Routine    Tobacco Use Counseling 3-10 Min [05516]       Meds & Refills for this Visit:  Requested Prescriptions      No prescriptions requested or ordered in this encounter       Imaging & Consults:  CT LUNG LD SCREENING(CPT=71271)    Labs ordered.  Advised to follow up with GI.  Will get COVID shot.      The patient indicates understanding of these issues and agrees to the plan.  The patient is asked to return in Return in about 3 months (around 5/28/2025) for med check 30  and schedule AWV for 7/2025.      Tobacco cessation counseling for 3-10 minutes (add E/M code #76847).  3 min.

## 2025-02-28 NOTE — PATIENT INSTRUCTIONS
Quitting Smoking    Quitting smoking is the most important step you can take to improve your health. We're glad you have set a goal to improve your health.    Quit Smoking Resources    In addition to medications, use the STAR plan to help you successfully quit.   Stick with your quit date!   Tell friends, family, and coworkers your quit date. Request their understanding and support.  Anticipate and prepare for challenges. Some examples are withdrawal symptoms, being around others who smoke, and drinking alcohol.  Remove all tobacco products and paraphernalia from your environment. Make your home and vehicles smoke-free.    Free resources for additional support:  National tobacco quitline: 1-800-QUIT-NOW (1-388.526.3793).  SmokefreeTXT is a free text program to assist you in quitting. Visit https://www.smokefree.gov/smokefreetxt for more information.  Feel free to call your care manager at ([phone]) for additional support.    Getting Support for Quitting Smoking  You don’t have to go through the process of quitting smoking without support. Tell people you are quitting. The support of friends, coworkers, and family members can make a big difference. Face-to-face or telephone counseling can also be helpful, as can a stop-smoking class or an ex-smokers’ group.     Set a quit date  If you’re serious about quitting smoking, choose a specific quit date within the next 2 to 4 weeks. Jaspal it in bright, bold letters on a calendar you use often. Tell people about your quit date. Ask for their support. Let your friends, coworkers, and family know how they can help you quit.   Make a contract  A quit-smoking contract gives you a goal. Write out the contract and sign it. Have it witnessed, if you like. Then keep the contract where you’ll see it often, or carry it with you. Read the contract when you’re tempted to smoke.   Take action  On the day you quit, reread your quit contract. Think about the benefits you gain by  quitting, such as better health and an improved sense of taste, as well as the money you will save from not smoking. Also:   Remove cigarettes from your home, car, or any other place where you stash them.  Throw away all smoking materials, including matches, lighters, and ashtrays.  Review your list of triggers and your plan for coping with each of them.  Stay away from people or settings you link with smoking.  Make a quit kit that includes gum, mints, carrot sticks, and things to keep your hands and mouth busy.  Talk to your healthcare provider about using quit-smoking products, such as medicine or a nicotine patch, inhaler, nasal spray, gum, or lozenges.  Ask for help  Sometimes you may just need to talk when you miss smoking. Ex-smokers are good to talk to, because they’re likely to know how you feel. You may need extra support in the first few weeks after you quit. Ask a friend to call you each day to see how you’re doing. Telephone counseling can also help you keep on track. Ask your healthcare provider, local hospital, or public health department to put you in touch with a phone counselor. You may also have to deal with doubters when you decide to quit. Explain to any doubters why you are quitting. Tell them that quitting is important to you. Ask for their support.   Tell your smoking buddies that you can walk together instead of smoking together. If someone thinks you won’t succeed, say that you have a good quit plan and ask for their support. Let them know you’re sticking with it. If they can't give you support, consider limiting contact with them for a while.   Stay positive, and if you slip, start again. Quitting smoking often requires repeated attempts. But smokers do quit for good. It's hard, but with determination and support, you can do it.   To learn more  Get more tips from these resources:  CDC at www.cdc.gov/tobacco/quit_smoking  or 800-QUIT-NOW (265-973-8612)  National Cancer [place] at  www.smokefree.gov  or 877-44U-QUIT (941-381-2577)  American Lung Association at www.lung.org/stop-smoking  or 800-LUNGUSA (095-030-1911)  Jovita last reviewed this educational content on 1/1/2022  © 8835-4869 [employer]. All rights reserved. This information is not intended as a substitute for professional medical care. Always follow your healthcare professional's instructions.

## 2025-03-12 DIAGNOSIS — I10 ESSENTIAL HYPERTENSION: ICD-10-CM

## 2025-03-12 RX ORDER — OLMESARTAN MEDOXOMIL 40 MG/1
TABLET ORAL
Qty: 90 TABLET | Refills: 0 | Status: SHIPPED | OUTPATIENT
Start: 2025-03-12

## 2025-03-12 NOTE — TELEPHONE ENCOUNTER
Last office visit: 2/28/25   Protocol: pass  Requested medication(s) are due for refill today: yes  Requested medication(s) are on the active medication list same strength, form, dose/ sig: yes  Requested medication(s) are managed by provider: yes  Patient has already received a courtsey refill: no    NOV: 5/30/25    Asked to Return: 5/28/25

## 2025-03-24 ENCOUNTER — HOSPITAL ENCOUNTER (OUTPATIENT)
Dept: CT IMAGING | Age: 70
Discharge: HOME | End: 2025-03-24
Attending: FAMILY MEDICINE
Payer: MEDICARE

## 2025-03-24 DIAGNOSIS — F17.210 SMOKING GREATER THAN 40 PACK YEARS: ICD-10-CM

## 2025-03-24 PROCEDURE — 71271 CT THORAX LUNG CANCER SCR C-: CPT | Performed by: FAMILY MEDICINE

## 2025-04-14 RX ORDER — SEMAGLUTIDE 2.68 MG/ML
2 INJECTION, SOLUTION SUBCUTANEOUS WEEKLY
Qty: 9 ML | Refills: 0 | Status: SHIPPED | OUTPATIENT
Start: 2025-04-14

## 2025-04-14 NOTE — TELEPHONE ENCOUNTER
Requested Prescriptions     Pending Prescriptions Disp Refills    OZEMPIC, 2 MG/DOSE, 8 MG/3ML Subcutaneous Solution Pen-injector [Pharmacy Med Name: Ozempic 8 Mg/3ml Inj Timi] 9 mL 0     Sig: Inject 2 mg into the skin once a week.     HGBA1C:    Lab Results   Component Value Date    A1C 6.8 (H) 02/25/2025    A1C 6.9 (A) 02/12/2025    A1C 6.5 (H) 10/24/2024     (H) 02/25/2025     Your Appointments      Friday June 13, 2025 2:00 PM  Exam - Established with Jo Ann Wilson DO  [facility], [facility] ([facility]) [address]  [facility] [address]  [phone]        Wednesday July 09, 2025 7:30 AM  Diabetes Pump follow up with DEACON Hassan  [facility] ([facility]) [address] [facility] [address]  [facility] [id number] [phone]        Tuesday July 15, 2025 9:15 AM  Follow Up Visit with Cesar Hutchinson MD  [facility], [facility] ([place] [address]) [address]  [facility] [id number] [phone]               Last Office Visit:  2--CB    Last Refill:  12--9 ml with 1 refills-CB

## 2025-04-23 RX ORDER — INSULIN GLARGINE 300 U/ML
INJECTION, SOLUTION SUBCUTANEOUS
Qty: 12 ML | Refills: 0 | Status: SHIPPED | OUTPATIENT
Start: 2025-04-23

## 2025-04-23 NOTE — TELEPHONE ENCOUNTER
Requested Prescriptions     Pending Prescriptions Disp Refills    TOUJEO MAX SOLOSTAR 300 UNIT/ML Subcutaneous Solution Pen-injector [Pharmacy Med Name: Toudeeo Max Solostar 300 Unit/Ml Inj Abigail] 12 mL 0     Sig: Use up to 65 units daily as directed     Future Appointments   Date Time Provider Department Center   6/13/2025  2:00 PM Jo Ann Wilson DO [facility] 11 [facility] [place]   7/9/2025  7:30 AM Elisa Mata APRN [id number] [facility] [place]   7/15/2025  9:15 AM Cesar Hutchinson MD [id number] [facility] [place] [address]     Last A1c value was 6.8% done 2/25/2025.  Refill 01/27/25 Ede   LOV 02/17/25 Ede

## 2025-05-15 DIAGNOSIS — E11.65 TYPE 2 DIABETES MELLITUS WITH HYPERGLYCEMIA, WITH LONG-TERM CURRENT USE OF INSULIN (HCC): ICD-10-CM

## 2025-05-15 DIAGNOSIS — Z79.4 TYPE 2 DIABETES MELLITUS WITH HYPERGLYCEMIA, WITH LONG-TERM CURRENT USE OF INSULIN (HCC): ICD-10-CM

## 2025-05-15 NOTE — TELEPHONE ENCOUNTER
Requested Prescriptions     Pending Prescriptions Disp Refills    METFORMIN HCL 1000 MG Oral Tab [Pharmacy Med Name: Metformin Hydrochloride 1,000 Mg Tab Brian] 180 tablet 0     Sig: TAKE ONE TABLET BY MOUTH TWICE A DAY WITH MEALS     HGBA1C:    Lab Results   Component Value Date    A1C 6.8 (H) 02/25/2025    A1C 6.9 (A) 02/12/2025    A1C 6.5 (H) 10/24/2024     (H) 02/25/2025     Your Appointments      Friday June 13, 2025 2:00 PM  Exam - Established with Jo Ann Wilson DO  [facility], [facility] ([facility]) [address]  [facility] [id number] [phone]        Wednesday July 09, 2025 7:30 AM  Diabetes Pump follow up with DEACON Hassan  [facility] ([facility]) [address]  [facility] [id number] [phone]        Tuesday July 15, 2025 9:15 AM  Follow Up Visit with Cesar Hutchinson MD  [facility], [facility] ([place] [address]) [address]  [facility] [id number] [phone]               Last Office Visit:  2--    Last Refill:  2--180 tabs with 0 refills-

## 2025-06-10 ENCOUNTER — LAB ENCOUNTER (OUTPATIENT)
Dept: LAB | Age: 70
End: 2025-06-10
Attending: FAMILY MEDICINE
Payer: MEDICARE

## 2025-06-10 DIAGNOSIS — E11.40 TYPE 2 DIABETES MELLITUS WITH DIABETIC NEUROPATHY, WITH LONG-TERM CURRENT USE OF INSULIN (HCC): ICD-10-CM

## 2025-06-10 DIAGNOSIS — E66.9 TYPE 2 DIABETES MELLITUS WITH OBESITY (HCC): ICD-10-CM

## 2025-06-10 DIAGNOSIS — E78.5 DYSLIPIDEMIA: ICD-10-CM

## 2025-06-10 DIAGNOSIS — E11.69 TYPE 2 DIABETES MELLITUS WITH OBESITY (HCC): ICD-10-CM

## 2025-06-10 DIAGNOSIS — E55.9 VITAMIN D DEFICIENCY: ICD-10-CM

## 2025-06-10 DIAGNOSIS — E08.22 DIABETES MELLITUS DUE TO UNDERLYING CONDITION WITH STAGE 3A CHRONIC KIDNEY DISEASE, WITH LONG-TERM CURRENT USE OF INSULIN (HCC): ICD-10-CM

## 2025-06-10 DIAGNOSIS — E11.59 HYPERTENSION ASSOCIATED WITH TYPE 2 DIABETES MELLITUS (HCC): ICD-10-CM

## 2025-06-10 DIAGNOSIS — Z79.4 DIABETES MELLITUS DUE TO UNDERLYING CONDITION WITH STAGE 3A CHRONIC KIDNEY DISEASE, WITH LONG-TERM CURRENT USE OF INSULIN (HCC): ICD-10-CM

## 2025-06-10 DIAGNOSIS — Z79.4 TYPE 2 DIABETES MELLITUS WITH DIABETIC NEUROPATHY, WITH LONG-TERM CURRENT USE OF INSULIN (HCC): ICD-10-CM

## 2025-06-10 DIAGNOSIS — N18.31 DIABETES MELLITUS DUE TO UNDERLYING CONDITION WITH STAGE 3A CHRONIC KIDNEY DISEASE, WITH LONG-TERM CURRENT USE OF INSULIN (HCC): ICD-10-CM

## 2025-06-10 DIAGNOSIS — I15.2 HYPERTENSION ASSOCIATED WITH TYPE 2 DIABETES MELLITUS (HCC): ICD-10-CM

## 2025-06-10 DIAGNOSIS — I10 ESSENTIAL HYPERTENSION: ICD-10-CM

## 2025-06-10 LAB
ALBUMIN SERPL-MCNC: 4.9 G/DL (ref 3.2–4.8)
ALBUMIN/GLOB SERPL: 1.8 {RATIO} (ref 1–2)
ALP LIVER SERPL-CCNC: 93 U/L (ref 45–117)
ALT SERPL-CCNC: 14 U/L (ref 10–49)
ANION GAP SERPL CALC-SCNC: 12 MMOL/L (ref 0–18)
AST SERPL-CCNC: 15 U/L (ref ?–34)
BASOPHILS # BLD AUTO: 0.06 X10(3) UL (ref 0–0.2)
BASOPHILS NFR BLD AUTO: 0.6 %
BILIRUB SERPL-MCNC: 0.5 MG/DL (ref 0.2–1.1)
BILIRUB UR QL STRIP.AUTO: NEGATIVE
BUN BLD-MCNC: 16 MG/DL (ref 9–23)
CALCIUM BLD-MCNC: 9.7 MG/DL (ref 8.7–10.6)
CHLORIDE SERPL-SCNC: 103 MMOL/L (ref 98–112)
CHOLEST SERPL-MCNC: 134 MG/DL (ref ?–200)
CLARITY UR REFRACT.AUTO: CLEAR
CO2 SERPL-SCNC: 25 MMOL/L (ref 21–32)
CREAT BLD-MCNC: 1.27 MG/DL (ref 0.7–1.3)
CREAT UR-SCNC: 43 MG/DL
EGFRCR SERPLBLD CKD-EPI 2021: 60 ML/MIN/1.73M2 (ref 60–?)
EOSINOPHIL # BLD AUTO: 0.2 X10(3) UL (ref 0–0.7)
EOSINOPHIL NFR BLD AUTO: 1.9 %
ERYTHROCYTE [DISTWIDTH] IN BLOOD BY AUTOMATED COUNT: 14.6 %
EST. AVERAGE GLUCOSE BLD GHB EST-MCNC: 117 MG/DL (ref 68–126)
FASTING PATIENT LIPID ANSWER: YES
FASTING STATUS PATIENT QL REPORTED: YES
GLOBULIN PLAS-MCNC: 2.7 G/DL (ref 2–3.5)
GLUCOSE BLD-MCNC: 135 MG/DL (ref 70–99)
GLUCOSE UR STRIP.AUTO-MCNC: >1000 MG/DL
HBA1C MFR BLD: 5.7 % (ref ?–5.7)
HCT VFR BLD AUTO: 49.7 % (ref 39–53)
HDLC SERPL-MCNC: 50 MG/DL (ref 40–59)
HGB BLD-MCNC: 16.7 G/DL (ref 13–17.5)
IMM GRANULOCYTES # BLD AUTO: 0.05 X10(3) UL (ref 0–1)
IMM GRANULOCYTES NFR BLD: 0.5 %
KETONES UR STRIP.AUTO-MCNC: NEGATIVE MG/DL
LDLC SERPL CALC-MCNC: 61 MG/DL (ref ?–100)
LEUKOCYTE ESTERASE UR QL STRIP.AUTO: 75
LYMPHOCYTES # BLD AUTO: 2.67 X10(3) UL (ref 1–4)
LYMPHOCYTES NFR BLD AUTO: 24.7 %
MCH RBC QN AUTO: 29 PG (ref 26–34)
MCHC RBC AUTO-ENTMCNC: 33.6 G/DL (ref 31–37)
MCV RBC AUTO: 86.3 FL (ref 80–100)
MICROALBUMIN UR-MCNC: 0.9 MG/DL
MICROALBUMIN/CREAT 24H UR-RTO: 20.9 UG/MG (ref ?–30)
MONOCYTES # BLD AUTO: 0.57 X10(3) UL (ref 0.1–1)
MONOCYTES NFR BLD AUTO: 5.3 %
NEUTROPHILS # BLD AUTO: 7.24 X10 (3) UL (ref 1.5–7.7)
NEUTROPHILS # BLD AUTO: 7.24 X10(3) UL (ref 1.5–7.7)
NEUTROPHILS NFR BLD AUTO: 67 %
NONHDLC SERPL-MCNC: 84 MG/DL (ref ?–130)
OSMOLALITY SERPL CALC.SUM OF ELEC: 293 MOSM/KG (ref 275–295)
PH UR STRIP.AUTO: 5.5 [PH] (ref 5–8)
PLATELET # BLD AUTO: 218 10(3)UL (ref 150–450)
POTASSIUM SERPL-SCNC: 4.5 MMOL/L (ref 3.5–5.1)
PROT SERPL-MCNC: 7.6 G/DL (ref 5.7–8.2)
PROT UR STRIP.AUTO-MCNC: NEGATIVE MG/DL
RBC # BLD AUTO: 5.76 X10(6)UL (ref 3.8–5.8)
RBC UR QL AUTO: NEGATIVE
SODIUM SERPL-SCNC: 140 MMOL/L (ref 136–145)
SP GR UR STRIP.AUTO: 1.01 (ref 1–1.03)
TRIGL SERPL-MCNC: 129 MG/DL (ref 30–149)
TSI SER-ACNC: 1.4 UIU/ML (ref 0.55–4.78)
UROBILINOGEN UR STRIP.AUTO-MCNC: NORMAL MG/DL
VIT D+METAB SERPL-MCNC: 43.9 NG/ML (ref 30–100)
VLDLC SERPL CALC-MCNC: 19 MG/DL (ref 0–30)
WBC # BLD AUTO: 10.8 X10(3) UL (ref 4–11)

## 2025-06-10 PROCEDURE — 36415 COLL VENOUS BLD VENIPUNCTURE: CPT

## 2025-06-10 PROCEDURE — 82306 VITAMIN D 25 HYDROXY: CPT

## 2025-06-10 PROCEDURE — 82570 ASSAY OF URINE CREATININE: CPT

## 2025-06-10 PROCEDURE — 85025 COMPLETE CBC W/AUTO DIFF WBC: CPT

## 2025-06-10 PROCEDURE — 80053 COMPREHEN METABOLIC PANEL: CPT

## 2025-06-10 PROCEDURE — 80061 LIPID PANEL: CPT

## 2025-06-10 PROCEDURE — 81001 URINALYSIS AUTO W/SCOPE: CPT

## 2025-06-10 PROCEDURE — 83036 HEMOGLOBIN GLYCOSYLATED A1C: CPT

## 2025-06-10 PROCEDURE — 84443 ASSAY THYROID STIM HORMONE: CPT

## 2025-06-10 PROCEDURE — 82043 UR ALBUMIN QUANTITATIVE: CPT

## 2025-06-10 RX ORDER — OLMESARTAN MEDOXOMIL 40 MG/1
40 TABLET ORAL DAILY
Qty: 90 TABLET | Refills: 0 | Status: SHIPPED | OUTPATIENT
Start: 2025-06-10

## 2025-06-10 NOTE — TELEPHONE ENCOUNTER
Last office visit: 2/28/2025   Protocol: pass  Requested medication(s) are due for refill today: yes  Requested medication(s) are on the active medication list same strength, form, dose/ sig: yes  Requested medication(s) are managed by provider: yes  Patient has already received a courtsey refill: no    NOV: 6/13/2025   Last Labs: 6/10/2025  Asked to Return: 5/28/2025

## 2025-06-13 ENCOUNTER — OFFICE VISIT (OUTPATIENT)
Dept: FAMILY MEDICINE CLINIC | Facility: CLINIC | Age: 70
End: 2025-06-13
Payer: MEDICARE

## 2025-06-13 VITALS
HEIGHT: 70 IN | RESPIRATION RATE: 18 BRPM | SYSTOLIC BLOOD PRESSURE: 128 MMHG | WEIGHT: 220.25 LBS | HEART RATE: 78 BPM | DIASTOLIC BLOOD PRESSURE: 74 MMHG | OXYGEN SATURATION: 98 % | BODY MASS INDEX: 31.53 KG/M2

## 2025-06-13 DIAGNOSIS — E11.59 HYPERTENSION ASSOCIATED WITH DIABETES (HCC): ICD-10-CM

## 2025-06-13 DIAGNOSIS — F17.210 SMOKING GREATER THAN 40 PACK YEARS: ICD-10-CM

## 2025-06-13 DIAGNOSIS — E08.22 DIABETES MELLITUS DUE TO UNDERLYING CONDITION WITH STAGE 3A CHRONIC KIDNEY DISEASE, WITH LONG-TERM CURRENT USE OF INSULIN (HCC): ICD-10-CM

## 2025-06-13 DIAGNOSIS — I15.2 HYPERTENSION ASSOCIATED WITH TYPE 2 DIABETES MELLITUS (HCC): ICD-10-CM

## 2025-06-13 DIAGNOSIS — J44.9 CHRONIC OBSTRUCTIVE PULMONARY DISEASE, UNSPECIFIED COPD TYPE (HCC): ICD-10-CM

## 2025-06-13 DIAGNOSIS — Z79.4 DIABETES MELLITUS DUE TO UNDERLYING CONDITION WITH STAGE 3A CHRONIC KIDNEY DISEASE, WITH LONG-TERM CURRENT USE OF INSULIN (HCC): ICD-10-CM

## 2025-06-13 DIAGNOSIS — Z79.4 TYPE 2 DIABETES MELLITUS WITH DIABETIC NEUROPATHY, WITH LONG-TERM CURRENT USE OF INSULIN (HCC): ICD-10-CM

## 2025-06-13 DIAGNOSIS — R82.71 ASYMPTOMATIC BACTERIURIA: ICD-10-CM

## 2025-06-13 DIAGNOSIS — I25.10 CORONARY ARTERY DISEASE INVOLVING NATIVE CORONARY ARTERY OF NATIVE HEART WITHOUT ANGINA PECTORIS: ICD-10-CM

## 2025-06-13 DIAGNOSIS — N18.31 DIABETES MELLITUS DUE TO UNDERLYING CONDITION WITH STAGE 3A CHRONIC KIDNEY DISEASE, WITH LONG-TERM CURRENT USE OF INSULIN (HCC): ICD-10-CM

## 2025-06-13 DIAGNOSIS — E11.69 TYPE 2 DIABETES MELLITUS WITH OBESITY (HCC): Primary | ICD-10-CM

## 2025-06-13 DIAGNOSIS — Z79.899 MEDICATION MANAGEMENT: ICD-10-CM

## 2025-06-13 DIAGNOSIS — I10 ESSENTIAL HYPERTENSION: ICD-10-CM

## 2025-06-13 DIAGNOSIS — E66.811 CLASS 1 OBESITY DUE TO EXCESS CALORIES WITH SERIOUS COMORBIDITY AND BODY MASS INDEX (BMI) OF 31.0 TO 31.9 IN ADULT: ICD-10-CM

## 2025-06-13 DIAGNOSIS — E66.9 TYPE 2 DIABETES MELLITUS WITH OBESITY (HCC): Primary | ICD-10-CM

## 2025-06-13 DIAGNOSIS — K22.719 BARRETT'S ESOPHAGUS WITH DYSPLASIA: ICD-10-CM

## 2025-06-13 DIAGNOSIS — Z87.891 PERSONAL HISTORY OF NICOTINE DEPENDENCE: ICD-10-CM

## 2025-06-13 DIAGNOSIS — I15.2 HYPERTENSION ASSOCIATED WITH DIABETES (HCC): ICD-10-CM

## 2025-06-13 DIAGNOSIS — E66.09 CLASS 1 OBESITY DUE TO EXCESS CALORIES WITH SERIOUS COMORBIDITY AND BODY MASS INDEX (BMI) OF 31.0 TO 31.9 IN ADULT: ICD-10-CM

## 2025-06-13 DIAGNOSIS — Z71.85 VACCINE COUNSELING: ICD-10-CM

## 2025-06-13 DIAGNOSIS — E78.5 DYSLIPIDEMIA: ICD-10-CM

## 2025-06-13 DIAGNOSIS — E11.59 HYPERTENSION ASSOCIATED WITH TYPE 2 DIABETES MELLITUS (HCC): ICD-10-CM

## 2025-06-13 DIAGNOSIS — E11.40 TYPE 2 DIABETES MELLITUS WITH DIABETIC NEUROPATHY, WITH LONG-TERM CURRENT USE OF INSULIN (HCC): ICD-10-CM

## 2025-06-13 DIAGNOSIS — F17.210 CIGARETTE SMOKER: ICD-10-CM

## 2025-06-13 NOTE — PROGRESS NOTES
HPI:   Eric Rodriguez is a 70 year old male who presents for recheck of his diabetes, hypertension and dyslipidemia. Patient has  been checking his FBS's at home regularly -- has not checked recently.  Last eye exam -- 7/29/24  Dr. Gil -- 8/2025 for colonoscopy and ? EGD  Dr. Issa -- 3/2025    Patient presents for recheck of his hypertension. Pt has been taking medications as instructed, no medication side effects, home BP monitoring in the range of 120's systolic and 70-80's diastolic.  Off hydrochlorothiazide and doing well.    His cholesterol is stable. He has been taking his Vytorin as ordered. He denies any side effects of the medications.  Lab work reviewed with the patient.  Also, he is a current every day smoker, he reports he smokes about 1 ppd.   He is aware he needs to quit  Counseling provided and smoking cessation advised. He will consider cessation but not now.    History of Present Illness  Eric Rodriguez is a 70 year old male with type 2 diabetes who presents for a follow-up visit.    He has experienced a recent weight loss of eight pounds, which he attributes to an increase in his Ozempic dosage from 22 clicks to 36 clicks, and a decrease in insulin usage. During a recent trip to [place], he adhered to a low-carb diet, resulting in some days without insulin use. His recent A1c was 5.7%, indicating well-controlled diabetes.    He has a history of asymptomatic bacteriuria, with urine tests showing bacteria in the past. No symptoms of a urinary tract infection.    He has experienced pruritus for two and a half years and is seeking a new gastroenterologist due to Dr. Gil's care home.    He received a tetanus shot on May 13, 2022, and a COVID-19 vaccine in early May 2025. No side effects from Ozempic and no pain or issues with bowel movements.    He smokes a little over a pack of cigarettes daily and plans to quit after a trip to [place].        Wt Readings from Last 6 Encounters:    06/13/25 220 lb 4 oz (99.9 kg)   02/28/25 228 lb 2 oz (103.5 kg)   02/12/25 233 lb 6.4 oz (105.9 kg)   10/29/24 234 lb 6 oz (106.3 kg)   09/30/24 231 lb (104.8 kg)   07/29/24 237 lb (107.5 kg)     Body mass index is 31.6 kg/m².     Results for orders placed or performed in visit on 06/10/25   CBC With Differential With Platelet    Collection Time: 06/10/25  7:55 AM   Result Value Ref Range    WBC 10.8 4.0 - 11.0 x10(3) uL    RBC 5.76 3.80 - 5.80 x10(6)uL    HGB 16.7 13.0 - 17.5 g/dL    HCT 49.7 39.0 - 53.0 %    .0 150.0 - 450.0 10(3)uL    MCV 86.3 80.0 - 100.0 fL    MCH 29.0 26.0 - 34.0 pg    MCHC 33.6 31.0 - 37.0 g/dL    RDW 14.6 %    Neutrophil Absolute Prelim 7.24 1.50 - 7.70 x10 (3) uL    Neutrophil Absolute 7.24 1.50 - 7.70 x10(3) uL    Lymphocyte Absolute 2.67 1.00 - 4.00 x10(3) uL    Monocyte Absolute 0.57 0.10 - 1.00 x10(3) uL    Eosinophil Absolute 0.20 0.00 - 0.70 x10(3) uL    Basophil Absolute 0.06 0.00 - 0.20 x10(3) uL    Immature Granulocyte Absolute 0.05 0.00 - 1.00 x10(3) uL    Neutrophil % 67.0 %    Lymphocyte % 24.7 %    Monocyte % 5.3 %    Eosinophil % 1.9 %    Basophil % 0.6 %    Immature Granulocyte % 0.5 %   Comp Metabolic Panel (14)    Collection Time: 06/10/25  7:55 AM   Result Value Ref Range    Glucose 135 (H) 70 - 99 mg/dL    Sodium 140 136 - 145 mmol/L    Potassium 4.5 3.5 - 5.1 mmol/L    Chloride 103 98 - 112 mmol/L    CO2 25.0 21.0 - 32.0 mmol/L    Anion Gap 12 0 - 18 mmol/L    BUN 16 9 - 23 mg/dL    Creatinine 1.27 0.70 - 1.30 mg/dL    Calcium, Total 9.7 8.7 - 10.6 mg/dL    Calculated Osmolality 293 275 - 295 mOsm/kg    eGFR-Cr 60 >=60 mL/min/1.73m2    AST 15 <34 U/L    ALT 14 10 - 49 U/L    Alkaline Phosphatase 93 45 - 117 U/L    Bilirubin, Total 0.5 0.2 - 1.1 mg/dL    Total Protein 7.6 5.7 - 8.2 g/dL    Albumin 4.9 (H) 3.2 - 4.8 g/dL    Globulin  2.7 2.0 - 3.5 g/dL    A/G Ratio 1.8 1.0 - 2.0    Patient Fasting for CMP? Yes    TSH W Reflex To Free T4    Collection Time:  06/10/25  7:55 AM   Result Value Ref Range    TSH 1.397 0.550 - 4.780 uIU/mL   Lipid Panel    Collection Time: 06/10/25  7:55 AM   Result Value Ref Range    Cholesterol, Total 134 <200 mg/dL    HDL Cholesterol 50 40 - 59 mg/dL    Triglycerides 129 30 - 149 mg/dL    LDL Cholesterol 61 <100 mg/dL    VLDL 19 0 - 30 mg/dL    Non HDL Chol 84 <130 mg/dL    Patient Fasting for Lipid? Yes    Vitamin D, 25-Hydroxy    Collection Time: 06/10/25  7:55 AM   Result Value Ref Range    Vitamin D, 25OH, Total 43.9 30.0 - 100.0 ng/mL   Hemoglobin A1C    Collection Time: 06/10/25  7:55 AM   Result Value Ref Range    HgbA1C 5.7 (H) <5.7 %    Estimated Average Glucose 117 68 - 126 mg/dL   Microalb/Creat Ratio, Random Urine    Collection Time: 06/10/25  7:55 AM   Result Value Ref Range    Microalbumin, Urine 0.90 mg/dL    Creatinine Ur Random 43.00 mg/dL    Malb/Cre Calc 20.9 <=30.0 ug/mg   Urinalysis, Routine    Collection Time: 06/10/25  7:55 AM   Result Value Ref Range    Urine Color Light-Yellow Yellow    Clarity Urine Clear Clear    Spec Gravity 1.012 1.005 - 1.030    Glucose Urine >1000 (A) Normal mg/dL    Bilirubin Urine Negative Negative    Ketones Urine Negative Negative mg/dL    Blood Urine Negative Negative    pH Urine 5.5 5.0 - 8.0    Protein Urine Negative Negative mg/dL    Urobilinogen Urine Normal Normal mg/dL    Nitrite Urine 2+ (A) Negative    Leukocyte Esterase Urine 75 (A) Negative    WBC Urine 6-10 (A) 0 - 5 /HPF    RBC Urine 0-2 0 - 2 /HPF    Bacteria Urine Rare (A) None Seen /HPF    Squamous Epi. Cells None Seen None Seen /HPF    Renal Tubular Epithelial Cells None Seen None Seen /HPF    Transitional Cells None Seen None Seen /HPF    Yeast Urine None Seen None Seen /HPF       Current Outpatient Medications   Medication Sig Dispense Refill    OLMESARTAN MEDOXOMIL 40 MG Oral Tab TAKE 1 TABLET BY MOUTH ONE TIME DAILY 90 tablet 0    METFORMIN HCL 1000 MG Oral Tab TAKE ONE TABLET BY MOUTH TWICE A DAY WITH MEALS 180  tablet 0    TOUJEO MAX SOLOSTAR 300 UNIT/ML Subcutaneous Solution Pen-injector Use up to 65 units daily as directed 12 mL 0    OZEMPIC, 2 MG/DOSE, 8 MG/3ML Subcutaneous Solution Pen-injector Inject 2 mg into the skin once a week. 9 mL 0    FARXIGA 10 MG Oral Tab Take 1 tablet (10 mg total) by mouth daily. 90 tablet 1    Continuous Glucose Sensor (DEXCOM G7 SENSOR) Does not apply Misc 1 each Every 10 days.      EZETIMIBE-SIMVASTATIN 10-40 MG Oral Tab TAKE 1 TABLET BY MOUTH EVERY OTHER DAY 45 tablet 1    NOVOLOG FLEXPEN 100 UNIT/ML Subcutaneous Solution Pen-injector INJECT 20 units INTO THE SKIN before each meal (Patient taking differently: INJECT 20 units INTO THE SKIN before each meal  10 in AM and other meals 8 for other meals 2/28/25) 60 mL 1    OMEGA-3-ACID ETHYL ESTERS 1 g Oral Cap TAKE 2 CAPSULES BY MOUTH TWICE A  capsule 1    finasteride 5 MG Oral Tab Take 1 tablet (5 mg total) by mouth daily. 90 tablet 6    tamsulosin 0.4 MG Oral Cap Take 1 capsule (0.4 mg total) by mouth daily. Take 1/2 hour following the same meal each day 90 capsule 5    omeprazole 20 MG Oral Capsule Delayed Release Take 1 capsule (20 mg total) by mouth every morning before breakfast.      Glucose Blood (CONTOUR NEXT TEST) In Vitro Strip 1 each by Other route 4 (four) times daily with meals and nightly. 400 each 3    Tiotropium Bromide Monohydrate (SPIRIVA HANDIHALER) 18 MCG Inhalation Cap Inhale 1 capsule (18 mcg total) into the lungs daily. 90 capsule 1    Insulin Pen Needle (BD PEN NEEDLE SHORT U/F) 31G X 8 MM Does not apply Misc Please disregard previous qty on this. Pt uses up to 4x a day. Pt uses 360 a month~ 400 each 1    Niacin  MG Oral Tab CR Take 1 tablet (500 mg total) by mouth 2 (two) times daily with meals.      Multiple Vitamins-Minerals (CENTRUM SILVER OR) Take by mouth.          Allergies   Allergen Reactions    Dander      dog    Other UNKNOWN    Penicillins DIARRHEA    Tetanus Toxoids UNKNOWN      Past  Medical History:    Elizondo's esophagus    Blood in urine    Enlarged badder, heavy for week after catheter inserted    COPD (chronic obstructive pulmonary disease) (HCC)    Diabetes mellitus (HCC)    Disorder of prostate    Turp scheduled for [facility]lyn 3    Esophageal reflux    Frequent urination    Hemorrhoids    Dr Nieves rubber banded 1.5 years ago    Hiatal hernia    High cholesterol    Painful urination    Sleep apnea    Unspecified essential hypertension    Unspecified gastritis and gastroduodenitis without mention of hemorrhage    Weight loss      Past Surgical History:   Procedure Laterality Date    Colonoscopy  03/30/2007    Colonoscopy  2013    Dr. 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 Gil; due in 2 years    Other surgical history N/A 7/2/2015    Procedure: ESOPHAGOGASTRODUODENOSCOPY (EGD);  Surgeon: Crescencio Gil MD;  Location:  ENDOSCOPY      Social History:   Social History     Socioeconomic History    Marital status:    Tobacco Use    Smoking status: Every Day     Current packs/day: 1.00     Average packs/day: 1 pack/day for 30.0 years (30.0 ttl pk-yrs)     Types: Cigarettes    Smokeless tobacco: Never    Tobacco comments:     1.0 pack weekends, only 4 cigs QD week days   Vaping Use    Vaping status: Never Used   Substance and Sexual Activity    Alcohol use: Not Currently     Alcohol/week: 0.0 standard drinks of alcohol     Comment: 2-3 in the last 3 months    Drug use: No    Sexual activity: Yes   Other Topics Concern    Caffeine Concern Yes     Comment: 3-4 coffee daily    Exercise Yes     Comment: occ     Exercise: around the house.  Diet: watches fats closely,  watches sugar closely and watches calories closely     REVIEW OF SYSTEMS:   GENERAL HEALTH: feels well otherwise  SKIN: denies any unusual skin lesions or rashes  EYES: denies blurry vision or eye pain  RESPIRATORY: denies shortness of breath with exertion  CARDIOVASCULAR: denies chest pain on exertion  GI: denies abdominal pain and denies nausea or vomitting  NEURO: denies headaches, dizziness, numbness or tingling  MUSCULOSKELETAL: denies any joint aches or pain. Denies any muscle aches or cramps.    EXAM:   /74   Pulse 78   Resp 18   Ht 5' 10\" (1.778 m)   Wt 220 lb 4 oz (99.9 kg)   SpO2 98%   BMI 31.60 kg/m²   GENERAL: well developed, well nourished,in no apparent distress, pleasant  SKIN: no rashes,no suspicious lesions  NECK: supple,no adenopathy,no bruits; no thyromegaly  LUNGS: clear to auscultation bilaterally; no rhonchi, rales or wheezing  CARDIO: RRR with 2/6 murmur  GI: good BS's,soft, non-tender, nondistended, no masses.  EXTREMITIES: no cyanosis, clubbing or edema  Bilateral barefoot skin diabetic exam is normal with diabetic monofilament/sensation testing abnormal  left great toe -- medial planar aspect     ASSESSMENT AND PLAN:   Eric Rodriguez is a 70 year old male who presents for a recheck of his diabetes, HTN, and dyslipidemia.   Diabetic control is good.    Recommendations are: continue present meds, check HgbA1C, check fasting lipids, CMP in 3 months.   Advised to continue to focus on healthier eating and exercise and weight loss. Discussed carbohydrate controlled diet.   Continue to check feet daily.    Cont. Trujeo and novolog.  On ozempic.  Cont. With DM center. Elisa.    Colonoscopy due in 2032.  Will need EGD in 1-2 years.  Dr. Gil will retire this year.  Hx of TURP. 6/2021  Seeing Dr. Hutchinson.  Seeing Dr. Issa for his heart.      Smoking -- still smoking. 1-1.5 ppd Stable on Spiriva for COPD. Advised strongly to quit smoking.  He will think about it.  Vitamin d def --  stable  Aortic stenosis -- stable  Hx of gout -- Uric acid is stable.  HTN -- stable, continue monitoring BP at home regularly. Stopped HCTZ  COPD -- stable on spiriva  DYSLIPIDEMIA-- Continue Vytorin QOD.  Takes it MWF. Continue working on diet and exercise.  GERD -- stable  Obese -- advised to lose 10 lbs.  Barett's -- sees Dr. Gil    Assessment & Plan  Type 2 Diabetes Mellitus  Diabetes well-controlled with A1c of 5.7%. Weight loss due to increased Ozempic and decreased insulin. Dietary changes may have improved glycemic control. Potential risk of macular degeneration with Ozempic use communicated to eye doctor.  - Continue current regimen with Ozempic and insulin adjustments as needed.  - Schedule diabetic labs, including A1c, in three months.  - Inform eye doctor about Ozempic use due to potential risk of macular degeneration.    Hypertension  Blood pressure borderline at 130/74 mmHg. No recent home monitoring due to previously low readings.  - Check blood pressure before he leaves the office.    Peripheral Neuropathy  Small numb spot on toe identified. Advised to wear closed-toed shoes to prevent injury.  - Advise him to continue wearing closed-toed shoes to protect feet from injury.    Asymptomatic Bacteriuria  Urinalysis positive for UTI, asymptomatic. Possible asymptomatic carrier noted previously.  - Advise him to report any symptoms of urinary tract infection if he occurs.    Tobacco Use Disorder  Smokes more than a pack per day. Plans to quit after trip to [place].  - Encourage smoking cessation and discuss plans to quit after the trip to [place].    General Health Maintenance  Up to date with vaccinations. Scheduled eye exam in July. Informed eye doctor about Ozempic use.  - Ensure he attends the scheduled eye exam in July and informs the eye doctor about Ozempic use.          Encounter Diagnoses   Name Primary?    Type 2 diabetes mellitus with obesity (HCC) Yes    Type 2 diabetes mellitus with  diabetic neuropathy, with long-term current use of insulin ([facility])     Class 1 obesity due to excess calories with serious comorbidity and body mass index (BMI) of 31.0 to 31.9 in adult     Hypertension associated with type 2 diabetes mellitus (HCC)     Diabetes mellitus due to underlying condition with stage 3a chronic kidney disease, with long-term current use of insulin ([facility])     Smoking greater than 40 pack years     Cigarette smoker     Hypertension associated with diabetes (HCC)     Dyslipidemia     Coronary artery disease involving native coronary artery of native heart without angina pectoris     Chronic obstructive pulmonary disease, unspecified COPD type ([facility])     Essential hypertension     Asymptomatic bacteriuria     Elizondo's esophagus with dysplasia     Personal history of nicotine dependence     Medication management     Vaccine counseling          Orders Placed This Encounter   Procedures    Comp Metabolic Panel (14)    Lipid Panel    Hemoglobin A1C       Meds & Refills for this Visit:  Requested Prescriptions      No prescriptions requested or ordered in this encounter       Imaging & Consults:  None    Labs ordered.  Advised to follow up with GI.  Will get COVID shot.      The patient indicates understanding of these issues and agrees to the plan.  The patient is asked to return in No follow-ups on file.      Tobacco cessation counseling for 3-10 minutes (add E/M code #63746).  3 min.

## 2025-06-13 NOTE — PROGRESS NOTES
The following individual(s) verbally consented to be recorded using ambient AI listening technology and understand that they can each withdraw their consent to this listening technology at any point by asking the clinician to turn off or pause the recording:    Patient name: Eric Rodriguez

## 2025-06-26 ENCOUNTER — TELEPHONE (OUTPATIENT)
Facility: CLINIC | Age: 70
End: 2025-06-26

## 2025-06-26 NOTE — TELEPHONE ENCOUNTER
Fax from [employer] for physician order filled form and attached OFFICE VISIT ntoes with bar code     Faxing [phone]

## 2025-06-30 RX ORDER — INSULIN GLARGINE 300 U/ML
INJECTION, SOLUTION SUBCUTANEOUS
Qty: 12 ML | Refills: 0 | Status: SHIPPED | OUTPATIENT
Start: 2025-06-30

## 2025-06-30 NOTE — TELEPHONE ENCOUNTER
Requested Prescriptions     Pending Prescriptions Disp Refills    TOUJEO MAX SOLOSTAR 300 UNIT/ML Subcutaneous Solution Pen-injector [Pharmacy Med Name: Toujeo Max Solostar 300 Unit/Ml Inj Abigail] 12 mL 0     Sig: Use up to 65 units daily as directed     Future Appointments   Date Time Provider Department Center   7/9/2025  7:30 AM Elisa Mata APRN [id number] [place]   7/15/2025  9:15 AM Cesar Hutchinson MD [id number] [place] [facility]   10/3/2025  7:30 AM Jo Ann Wilson DO [facility]     Last A1c value was 5.7% done 6/10/2025.  Szhbce98/23/25 Ede   LOV 02/12/25 Ede

## 2025-07-08 NOTE — PROGRESS NOTES
HPI:     Eric Rodriguez is a 70 year old male with a PMH of obesity, HTN, HL, DM, GERD, COPD, asthma, CAD, hemorrhoids    Following for:  1. Hypotonic neurogenic bladder with urinary retention requiring CIC  - incidentally found to have asymptomatic urinary retention with b/l hydronephrosis on imaging with > 2 liters out (conteh placed 4/20).   - s/p TURP 6/3/21  - pt had decreased CIC frequency based on post-void cath volumes with intermittent difficulty catheterizing and note to be in retention prior visit  - UDS 12/6/21 with poor sensation and impaired detrusor contractility  - Cysto 12/6/21: very mild prostate tissue regrowth and slight constriction near bladder neck but is wide open overall with no signs of significant obstruction.  - on bethanechol 100 mg BID 12/29/21 but stopped 7/5/23 and was on CIC but stopped in 2024  2. Severe BPH/LUTS  - flomax for years, started proscar 4/20/21  - s/p TURP 6/3/21 (~ 67 g resected, benign)  3. Recurrent UTIs  4. Elevated PSA  5. 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    PCP - Katie Major Surg - Lizbeth KERN - [place]    LOV 1/13/2025    Wife works at [employer].  Presents for check-up. He feels well. No interim UTIs. He is taking 0.4 mg flomax and proscar. Went to [place] and [place] in Feb.  Tries to void every 3-4 h and occasionally more frequent. He stopped catheterizing in summer 2024 - prior visit he was catheterizing ~ once every 2 weeks with ~ 100 mL out.     AUA SS is 5/35 with 2 n; 1 f, w, LOS. Was 20/35 prior to TURP with 5/5 w, LOS; 4/5 f; 3/5 n; 2/5 I; 1/5 u. Happy with LUTS.  Incontinence: none - reported post-void dribbling prior visit    UA is negative and PVR is 159 mL - recent range  mL for the past several checks.    UCx 8/11/21: < 50 K  Claudio  UCx 9/2/22: >100 K Claudio    Has ED and prefers observation.    Drinks ~ 60 oz water per day with light yellow urine - was cloudy in the past. We again discussed continuing to drink enough water to keep urine clear to pale yellow for UTI prevention.  Dr Wilson strongly recommended he increase water intake d/t mild renal insufficiency on recent labs.    Voided 342.9 mL prior to UDS with PVR of 425.  UDS 12/6/21: first sensation at 352 mL. He did not have strong desire even after 598 mL. Procedure stopped. Pt given permission to void and could not with with max detrusor pressure of 25 cm H2O and PVR of 650 mL.    PSA History:  - 0.26 2/25/25  - 0.3 10/24/24  - 0.19 4/23/24  - 0.19 10/27/22  - 0.36 10/22/21  - 3.82 10/8/20  - 3.14 7/18/19  - 4.22 4/19/19  - 2.54 11/9/17  - 2.27 11/29/12    Cr 1.27 6/10/25    UDS 12/6/21: first sensation at 352 mL. He did not have strong desire even after 598 mL. Procedure stopped. Pt given permission to void and could not with with max detrusor pressure of 25 cm H2O and PVR of 650 mL.    CT SP 9/14/21: resolution of b/l hydronephrosis, s/p TURP, stable air in contracted GB suggesting chronic fistula from GB to bowel  RBUS 4/27/21: hydronephrosis has resolved. Reporting b/l stones but I again reviewed CT and no stones noted other than possible vascular calcification on left.  CT SP  4/19/21: markedly distended bladder, moderate to severe BPH with air noted in gallbladder. He has a ~ 5 mm left renal calcification but this may be vascular. No obstructing stones with hydroureter down to the bladder.  RBUS 4/18/21: markedly distended bladder, mod b/l hydro with 8 mm left renal stone  CT chest 4/13/21: partially imaged b/l hydronephrosis.    He has stopped performing CIC, will continue flomax, proscar for BPH/LUTS. Continue annual PSA checks for h/o elevated PSA. Observation for ED. Will plan for f/u in 6 mo with PVR due to recent rise in PVR.    Prior note:  We discussed that UDS is  consistent with poor bladder sensation. He has impaired bladder contractility but this is present. If he continues to have issues catheterizing we could consider cysto with TUR of bladder neck and partial TURP and discussed there is a chance he may empty a bit better. He'd prefer we avoid for now.    Prior note:    I initially saw him with > 10 y h/o worsening LUTS, needing to sit to void and imaging showing b/l hydronephrosis. Placed conteh last visit with > 2 liters out and UA showing moderate blood.    UTI history: once ~ 1 y ago  Gross hematuria: none  Tobacco hx: 30 pack years, current smoker  Kidney stone hx: none    HISTORY:  Past Medical History:    Elizondo's esophagus    Blood in urine    Enlarged badder, heavy for week after catheter inserted    COPD (chronic obstructive pulmonary disease) (HCC)    Diabetes mellitus (HCC)    Disorder of prostate    Turp scheduled for Merlyn 3    Esophageal reflux    Frequent urination    Hemorrhoids    Dr Nieves rubber banded 1.5 years ago    Hiatal hernia    High cholesterol    Painful urination    Sleep apnea    Unspecified essential hypertension    Unspecified gastritis and gastroduodenitis without mention of hemorrhage    Weight loss      Past Surgical History:   Procedure Laterality Date    Colonoscopy  03/30/2007    Colonoscopy  2013    Dr. 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 Gil; due in 2 years    Other surgical history N/A 7/2/2015    Procedure: ESOPHAGOGASTRODUODENOSCOPY (EGD);  Surgeon: Crescencio Gil MD;  Location:  ENDOSCOPY      Family History   Problem Relation Age of Onset    Other (CHF[other]) Mother     Diabetes Mother          Type II    Diabetes Father         Type II    Other (Other[other]) Father     Stroke Father     Other (Amputation[other]) Brother         leg below knee    Diabetes Brother     Cancer Sister         colon    Diabetes Brother         Type II    Other (Diabetic retinopathy[other]) Brother     Cancer Sister         liver    Cancer Sister         ovarian      Social History:   Social History     Socioeconomic History    Marital status:    Tobacco Use    Smoking status: Every Day     Current packs/day: 1.00     Average packs/day: 1 pack/day for 30.0 years (30.0 ttl pk-yrs)     Types: Cigarettes    Smokeless tobacco: Never    Tobacco comments:     1.0 pack weekends, only 4 cigs QD week days   Vaping Use    Vaping status: Never Used   Substance and Sexual Activity    Alcohol use: Not Currently     Alcohol/week: 0.0 standard drinks of alcohol     Comment: 2-3 in the last 3 months    Drug use: No    Sexual activity: Yes   Other Topics Concern    Caffeine Concern Yes     Comment: 3-4 coffee daily    Exercise Yes     Comment: occ        Medications (Active prior to today's visit):  Current Outpatient Medications   Medication Sig Dispense Refill    finasteride 5 MG Oral Tab Take 1 tablet (5 mg total) by mouth daily. 90 tablet 6    tamsulosin 0.4 MG Oral Cap Take 1 capsule (0.4 mg total) by mouth daily. Take 1/2 hour following the same meal each day 90 capsule 5    NOVOLOG FLEXPEN 100 UNIT/ML Subcutaneous Solution Pen-injector 8- 14 units at meals twice daily   TOTAL DAILY DOSE OF INSULIN= 30 units 30 mL 1    omega-3-acid ethyl esters 1 g Oral Cap TAKE 2 CAPSULES BY MOUTH TWICE A  capsule 1    TOUJEO MAX SOLOSTAR 300 UNIT/ML Subcutaneous Solution Pen-injector Use up to 65 units daily as directed 12 mL 0    OLMESARTAN MEDOXOMIL 40 MG Oral Tab TAKE 1 TABLET BY MOUTH ONE TIME DAILY 90 tablet 0    METFORMIN HCL 1000 MG Oral Tab TAKE ONE TABLET BY MOUTH TWICE A DAY WITH MEALS 180 tablet 0    OZEMPIC, 2 MG/DOSE, 8  MG/3ML Subcutaneous Solution Pen-injector Inject 2 mg into the skin once a week. 9 mL 0    FARXIGA 10 MG Oral Tab Take 1 tablet (10 mg total) by mouth daily. 90 tablet 1    Continuous Glucose Sensor (DEXCOM G7 SENSOR) Does not apply Misc 1 each Every 10 days.      EZETIMIBE-SIMVASTATIN 10-40 MG Oral Tab TAKE 1 TABLET BY MOUTH EVERY OTHER DAY 45 tablet 1    omeprazole 20 MG Oral Capsule Delayed Release Take 1 capsule (20 mg total) by mouth every morning before breakfast.      Glucose Blood (CONTOUR NEXT TEST) In Vitro Strip 1 each by Other route 4 (four) times daily with meals and nightly. 400 each 3    Tiotropium Bromide Monohydrate (SPIRIVA HANDIHALER) 18 MCG Inhalation Cap Inhale 1 capsule (18 mcg total) into the lungs daily. 90 capsule 1    Insulin Pen Needle (BD PEN NEEDLE SHORT U/F) 31G X 8 MM Does not apply Misc Please disregard previous qty on this. Pt uses up to 4x a day. Pt uses 360 a month~ 400 each 1    Niacin  MG Oral Tab CR Take 1 tablet (500 mg total) by mouth 2 (two) times daily with meals.      Multiple Vitamins-Minerals (CENTRUM SILVER OR) Take by mouth.           Allergies:  Allergies   Allergen Reactions    Dander      dog    Other UNKNOWN    Penicillins DIARRHEA    Tetanus Toxoids UNKNOWN         ROS:     A comprehensive 10 point review of systems was completed.  Pertinent positives and negatives noted in the the HPI.    PHYSICAL EXAM:     GENERAL APPEARANCE: well, developed, well nourished, in no acute distress  NEUROLOGIC: nonfocal, alert and oriented  HEAD: normocephalic, atraumatic  EYES: sclera non-icteric  EARS: hearing intact  ORAL CAVITY: mucosa moist  NECK/THYROID: no obvious goiter or masses  LUNGS: nonlabored breathing  ABDOMEN: soft, no obvious masses or tenderness  SKIN: no obvious rashes    : as noted above     ASSESSMENT/PLAN:   Diagnoses and all orders for this visit:    BPH with obstruction/lower urinary tract symptoms  -     URINALYSIS, AUTO, W/O SCOPE  -     finasteride  5 MG Oral Tab; Take 1 tablet (5 mg total) by mouth daily.  -     tamsulosin 0.4 MG Oral Cap; Take 1 capsule (0.4 mg total) by mouth daily. Take 1/2 hour following the same meal each day    Hypotonic neurogenic bladder  -     URINALYSIS, AUTO, W/O SCOPE    Recurrent UTI  -     URINALYSIS, AUTO, W/O SCOPE    Elevated PSA  -     URINALYSIS, AUTO, W/O SCOPE    Bilateral hydronephrosis  -     URINALYSIS, AUTO, W/O SCOPE    Urinary retention  -     URINALYSIS, AUTO, W/O SCOPE    - as noted above    Thanks again for this consult.    Cesar Hutchinson MD, FACS  Urologist  [facility]  Office: [phone]

## 2025-07-08 NOTE — PROGRESS NOTES
Chief Complaint   Patient presents with    Diabetes     Follow up streaming dexcom        Eric Rodriguez is a 70 year old presenting for type 2 diabetes management.   Primary care physician: Jo Ann Wilson DO  Last Diabetes appointment with me 2.2025 --> ozempic dose increased    Most recent A1C  5.7% ( last A1C 6.8%)   Had some issues w dexcom - I phone 7 stopped working and he was using ipad - but now using  for continuity of continuous glucose sensor data     Patient self decreased basal insulin due to lower trends   Denies any defensive eating or active hypoglycemia     Reviewed CGM report/trends w patient today:   Dexcom (full download in media)   GMI 7.0%     Average glucose:  136 mg/dl     Hypoglycemia 0%    Time in Range 91  %  (ADA recommended goal > 70%)   Variability WNL ( < 33%)   Lowest trends before dnner   + xiomara phenom         Diabetes History:  Type 2 DM: ~ 2008   Patient has not had hospitalizations for blood sugar issues  denies any history of pancreatitis      Previous DM therapies:  Glyburide ; change in therapy   Janumet : change in therapy   Levemir : 8-2023 : concentrated basal rx       Current DM Regimen:  Metformin 1000mg twice daily     Farxiga 10mg once daily     Ozempic (36 clicks)1.0mg subcutaneous once weekly (dosing on 2mg pen)      Toujeo max solo star: 40 units once daily in AM --> dosing 36 units   Novolog flex pen:   Under 110mg/dl : doses 8- units   Over 110 mg/dl : dose 14 units   Dinner:     10 units at dinner       HGBA1C:    Lab Results   Component Value Date    A1C 5.7 (H) 06/10/2025    A1C 6.8 (H) 02/25/2025    A1C 6.9 (A) 02/12/2025     06/10/2025       Lab Results   Component Value Date    CHOLEST 134 06/10/2025    CHOLEST 138 02/25/2025    TRIG 129 06/10/2025    TRIG 123 02/25/2025    HDL 50 06/10/2025    HDL 46 02/25/2025    LDL 61 06/10/2025    LDL 70 02/25/2025     Lab Results   Component Value Date    MICROALBCREA 20.9 06/10/2025    MICROALBCREA  31.8 (H) 02/25/2025      Lab Results   Component Value Date    CREATSERUM 1.27 06/10/2025    CREATSERUM 1.45 (H) 02/25/2025    EGFRCR 60 06/10/2025    EGFRCR 52 (L) 02/25/2025     Lab Results   Component Value Date    AST 15 06/10/2025    AST 18 02/25/2025    ALT 14 06/10/2025    ALT 26 02/25/2025       Lab Results   Component Value Date    TSH 1.397 06/10/2025    TSH 1.910 04/23/2024           DM Complications:  Microvascular:   Neuropathy: no  Retinopathy: no  Nephropathy: yes    Macrovascular:  PVD: no  CAD: yes Dr Issa . Medical management (no hx CABG or stents)   Stroke/CVA: no        Modifying factors:  Medication adherence: yes   Cost of prescriptions   Recent steroids, illness or infections ( past 3m): no     Allergies: Dander, Other, Penicillins, and Tetanus toxoids    Past Medical History:    Elizondo's esophagus    Blood in urine    Enlarged badder, heavy for week after catheter inserted    COPD (chronic obstructive pulmonary disease) (HCC)    Diabetes mellitus (HCC)    Disorder of prostate    Turp scheduled for Merlyn 3    Esophageal reflux    Frequent urination    Hemorrhoids    Dr Nieves rubber banded 1.5 years ago    Hiatal hernia    High cholesterol    Painful urination    Sleep apnea    Unspecified essential hypertension    Unspecified gastritis and gastroduodenitis without mention of hemorrhage    Weight loss     Past Surgical History:   Procedure Laterality Date    Colonoscopy  03/30/2007    Colonoscopy  2013    Dr. 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 Gil; due in 2 years    Other surgical history N/A 7/2/2015    Procedure: ESOPHAGOGASTRODUODENOSCOPY (EGD);   Surgeon: Crescencio Gil MD;  Location:  ENDOSCOPY     Social History     Socioeconomic History    Marital status:    Tobacco Use    Smoking status: Every Day     Current packs/day: 1.00     Average packs/day: 1 pack/day for 30.0 years (30.0 ttl pk-yrs)     Types: Cigarettes    Smokeless tobacco: Never    Tobacco comments:     1.0 pack weekends, only 4 cigs QD week days   Vaping Use    Vaping status: Never Used   Substance and Sexual Activity    Alcohol use: Not Currently     Alcohol/week: 0.0 standard drinks of alcohol     Comment: 2-3 in the last 3 months    Drug use: No    Sexual activity: Yes   Other Topics Concern    Caffeine Concern Yes     Comment: 3-4 coffee daily    Exercise Yes     Comment: occ     Family History   Problem Relation Age of Onset    Other (CHF[other]) Mother     Diabetes Mother         Type II    Diabetes Father         Type II    Other (Other[other]) Father     Stroke Father     Other (Amputation[other]) Brother         leg below knee    Diabetes Brother     Cancer Sister         colon    Diabetes Brother         Type II    Other (Diabetic retinopathy[other]) Brother     Cancer Sister         liver    Cancer Sister         ovarian     Current Medication List:   Current Outpatient Medications   Medication Sig Dispense Refill    NOVOLOG FLEXPEN 100 UNIT/ML Subcutaneous Solution Pen-injector 8- 14 units at meals twice daily   TOTAL DAILY DOSE OF INSULIN= 30 units 30 mL 1    TOUJEO MAX SOLOSTAR 300 UNIT/ML Subcutaneous Solution Pen-injector Use up to 65 units daily as directed 12 mL 0    OLMESARTAN MEDOXOMIL 40 MG Oral Tab TAKE 1 TABLET BY MOUTH ONE TIME DAILY 90 tablet 0    METFORMIN HCL 1000 MG Oral Tab TAKE ONE TABLET BY MOUTH TWICE A DAY WITH MEALS 180 tablet 0    FARXIGA 10 MG Oral Tab Take 1 tablet (10 mg total) by mouth daily. 90 tablet 1    EZETIMIBE-SIMVASTATIN 10-40 MG Oral Tab TAKE 1 TABLET BY MOUTH EVERY OTHER DAY 45 tablet 1    OMEGA-3-ACID ETHYL ESTERS 1 g Oral Cap TAKE 2  CAPSULES BY MOUTH TWICE A  capsule 1    finasteride 5 MG Oral Tab Take 1 tablet (5 mg total) by mouth daily. 90 tablet 6    tamsulosin 0.4 MG Oral Cap Take 1 capsule (0.4 mg total) by mouth daily. Take 1/2 hour following the same meal each day 90 capsule 5    omeprazole 20 MG Oral Capsule Delayed Release Take 1 capsule (20 mg total) by mouth every morning before breakfast.      Multiple Vitamins-Minerals (CENTRUM SILVER OR) Take by mouth.        OZEMPIC, 2 MG/DOSE, 8 MG/3ML Subcutaneous Solution Pen-injector Inject 2 mg into the skin once a week. 9 mL 0    Continuous Glucose Sensor (DEXCOM G7 SENSOR) Does not apply Misc 1 each Every 10 days.      Glucose Blood (CONTOUR NEXT TEST) In Vitro Strip 1 each by Other route 4 (four) times daily with meals and nightly. 400 each 3    Tiotropium Bromide Monohydrate (SPIRIVA HANDIHALER) 18 MCG Inhalation Cap Inhale 1 capsule (18 mcg total) into the lungs daily. 90 capsule 1    Insulin Pen Needle (BD PEN NEEDLE SHORT U/F) 31G X 8 MM Does not apply Misc Please disregard previous qty on this. Pt uses up to 4x a day. Pt uses 360 a month~ 400 each 1    Niacin  MG Oral Tab CR Take 1 tablet (500 mg total) by mouth 2 (two) times daily with meals.             DM associated review of  symptoms:   Endocrine: Polyuria, polyphagia, polydipsia: no  Neurological: Paresthesias: yes   HEENT: Blurred vision: no  Skin: no rash or wounds  Hematological: Hypoglycemia: no      Review of Systems     LUNGS: denies shortness of breath   CARDIOVASCULAR: denies chest pain  GI: denies abdominal pain, nausea or diarrhea   : denies dysuria      Physical exam:  /64   Pulse 79   Resp 16   Wt 222 lb (100.7 kg)   SpO2 97%   BMI 31.85 kg/m²   Body mass index is 31.85 kg/m².    Physical Exam     Constitutional: Normal appearance   Cardiovascular: Normal rate   Pulmonary/Chest: Effort normal  Neurological: Alert and oriented .   Psychiatric: Normal mood and affect.          Assessment/Plan:    Dyslipidemia   Cholesterol: 134, done on 6/10/2025.  HDL Cholesterol: 50, done on 6/10/2025.  TriGlycerides 129, done on 6/10/2025.  LDL Cholesterol: 61, done on 6/10/2025.    Stable labs. Needs ongoing monitoring     Continue Vytorin 10-40mg  rx     Nephropathy /Chronic Kidney Disease   Reminded patient impact glycemic trends have on kidney health   Lab Results   Component Value Date    EGFRCR 60 06/10/2025    MICROALBCREA 20.9 06/10/2025    Urine and egfr have improved.     Needs ongoing monitoring   Continue arb, Farxiga and GLP 1      Obesity   Weight 222   lb ( last weight:   231  lb )   Pre GLP  weight : 246 lb   Weight is improving  Continue lifestyle modification   Continue Ozempic dose--> see DM     CAD   Per ADA standards of care, guidelines, in patients with type 2 diabetes and established ASCVD or indicators of high CV risk dsease,  incorporating agents proven to reduce major adverse CV events and CV mortality is indicated  Continue glp1 rx  and SGLT2i - see DM          Type 2 diabetes mellitus with hyperglycemia, with long-term current use of insulin ([facility])  A1C: 6.9  % ( last A1C 6.5%)   Weight: 222 lb  ( last weight: 233 lb, 246  lb )     Diabetes control is improving; needs ongoing management and evaluation  given the chronicity of Diabetes, ongoing medication monitoring and risk for complications     Decrease NOVOLOG at B/D meals   Novolog flex pen:     Under 110mg/dl : dose 8- units   Over 110 mg/dl : dose 12 units     Dinner: decrease to 8 units     Ozempic to 1.0 mg subcutaneous once weekly (dose 36 clicks on 2.0 mg Ozempic pen)       Continue    Toujeo max 36  units once daily   Dexcom G7 ([employer] )   Metformin 1000mg twice daily      Farxiga 10mg once daily   ~reminded to stay hydrated and watch for s/s of uti or yeast infections       Reviewed  clinical significance of A1c, adverse effects of suboptimal glucose control, and goals of therapy   Reviewed the  A1C test, what the value reflects and the goal for the patient.   Reminded pt on A1C and blood sugar targets (Fasting < 130 and post prandial <180 ) and complications associated with hyperglycemia and uncontrolled DM (on AVS)   Recommended SMBG 2- x daily if not using CGM   Reviewed s/s and treatment of hypoglycemia (on AVS)   Cost barrier w Glucagon - pt has low alert on Dexcom at 70 mg/dl   Continue with lifestyle modifications since they have positive impact on diabetes/blood sugars/health (portion control, physical activity, weight loss)   Reinforced timing and adherence with medication, self-monitoring of blood glucose and routine follow up  Follow up with dann qiu  but recommended to contact DM clinic sooner if questions or concerns.    The patient indicates understanding of these issues and agrees to the plan.      Orders Placed This Encounter    NOVOLOG FLEXPEN 100 UNIT/ML Subcutaneous Solution Pen-injector     Si- 14 units at meals twice daily   TOTAL DAILY DOSE OF INSULIN= 30 units     Dispense:  30 mL     Refill:  1     Patient will also need insulin pen needles       Diabetes complications & risks surveillance:   A1C/Blood pressure: as reported    Last dilated eye exam: Last Dilated Eye Exam: 24   Exam shows retinopathy? Eye Exam shows Diabetic Retinopathy?: No  Last diabetic foot exam: Last Foot Exam: 25  Nephropathy screening:    Continue ace /arb rx.    Lab Results   Component Value Date    EGFRCR 60 06/10/2025    MICROALBCREA 20.9 06/10/2025     LIPID screening:    Lab Results   Component Value Date    CHOLEST 134 06/10/2025    LDL 61 06/10/2025    TRIG 129 06/10/2025    HDL 50 06/10/2025    FASTING Yes 06/10/2025    FASTING Yes 06/10/2025     Cholesterol Lowering Medications            EZETIMIBE-SIMVASTATIN 10-40 MG Oral Tab    OMEGA-3-ACID ETHYL ESTERS 1 g Oral Cap           Has appointment next week for Dr Renteria for Diabetes eye exam

## 2025-07-09 ENCOUNTER — OFFICE VISIT (OUTPATIENT)
Dept: ENDOCRINOLOGY CLINIC | Facility: CLINIC | Age: 70
End: 2025-07-09
Payer: MEDICARE

## 2025-07-09 ENCOUNTER — TELEPHONE (OUTPATIENT)
Dept: ENDOCRINOLOGY CLINIC | Facility: CLINIC | Age: 70
End: 2025-07-09

## 2025-07-09 VITALS
SYSTOLIC BLOOD PRESSURE: 122 MMHG | OXYGEN SATURATION: 97 % | WEIGHT: 222 LBS | RESPIRATION RATE: 16 BRPM | BODY MASS INDEX: 32 KG/M2 | HEART RATE: 79 BPM | DIASTOLIC BLOOD PRESSURE: 64 MMHG

## 2025-07-09 DIAGNOSIS — N18.31 CKD STAGE 3A, GFR 45-59 ML/MIN (HCC): ICD-10-CM

## 2025-07-09 DIAGNOSIS — E78.5 DYSLIPIDEMIA: ICD-10-CM

## 2025-07-09 DIAGNOSIS — E78.5 DYSLIPIDEMIA ASSOCIATED WITH TYPE 2 DIABETES MELLITUS (HCC): ICD-10-CM

## 2025-07-09 DIAGNOSIS — E11.40 TYPE 2 DIABETES MELLITUS WITH DIABETIC NEUROPATHY, WITH LONG-TERM CURRENT USE OF INSULIN (HCC): Primary | ICD-10-CM

## 2025-07-09 DIAGNOSIS — E11.59 HYPERTENSION ASSOCIATED WITH DIABETES (HCC): ICD-10-CM

## 2025-07-09 DIAGNOSIS — E66.811 CLASS 1 OBESITY DUE TO EXCESS CALORIES WITH SERIOUS COMORBIDITY AND BODY MASS INDEX (BMI) OF 31.0 TO 31.9 IN ADULT: ICD-10-CM

## 2025-07-09 DIAGNOSIS — Z79.4 TYPE 2 DIABETES MELLITUS WITH DIABETIC NEUROPATHY, WITH LONG-TERM CURRENT USE OF INSULIN (HCC): Primary | ICD-10-CM

## 2025-07-09 DIAGNOSIS — I15.2 HYPERTENSION ASSOCIATED WITH DIABETES (HCC): ICD-10-CM

## 2025-07-09 DIAGNOSIS — E11.69 DYSLIPIDEMIA ASSOCIATED WITH TYPE 2 DIABETES MELLITUS (HCC): ICD-10-CM

## 2025-07-09 DIAGNOSIS — E66.09 CLASS 1 OBESITY DUE TO EXCESS CALORIES WITH SERIOUS COMORBIDITY AND BODY MASS INDEX (BMI) OF 31.0 TO 31.9 IN ADULT: ICD-10-CM

## 2025-07-09 PROCEDURE — G2211 COMPLEX E/M VISIT ADD ON: HCPCS | Performed by: NURSE PRACTITIONER

## 2025-07-09 PROCEDURE — 99214 OFFICE O/P EST MOD 30 MIN: CPT | Performed by: NURSE PRACTITIONER

## 2025-07-09 PROCEDURE — 95251 CONT GLUC MNTR ANALYSIS I&R: CPT | Performed by: NURSE PRACTITIONER

## 2025-07-09 RX ORDER — OMEGA-3-ACID ETHYL ESTERS 1 G/1
2 CAPSULE, LIQUID FILLED ORAL 2 TIMES DAILY
Qty: 120 CAPSULE | Refills: 1 | Status: SHIPPED | OUTPATIENT
Start: 2025-07-09

## 2025-07-09 RX ORDER — INSULIN ASPART 100 [IU]/ML
INJECTION, SOLUTION INTRAVENOUS; SUBCUTANEOUS
Qty: 30 ML | Refills: 1 | OUTPATIENT
Start: 2025-07-09

## 2025-07-09 NOTE — TELEPHONE ENCOUNTER
Physician order from [employer] for G7 sensor attached OFFICE VISIT notes today 07/09/25 faxing too [phone]

## 2025-07-09 NOTE — PATIENT INSTRUCTIONS
A1C 5.7% ( last A1C 6.8)   You  have lost 11 lb     Continue     Metformin 1000mg twice daily     Farxiga 10mg once daily     Ozempic 1.0mg subcutaneous once weekly (36 clicks on 2mg pen)       Toujeo max solo star: 36 units   Novolog flex pen:     Breakfast: decrease     Under 110mg/dl : dose 8- units   Over 110 mg/dl : dose 12 units     Dinner: decrease to 8 units       American Diabetes Association: blood sugar targets:     Fasting blood sugar (before breakfast) Target:    (ideally less than 110)  2 hours after eating less than 180 (ideally less than  150 )     Call for blood sugars less than  75 or greater than  200 more than 2 times in a week     If you are wearing the sensor, please look at your trends/averages    Recommendations:   GMI (estimated A1C ) target under  7%     Time in range (healthy blood sugar targets) : goal is over 70%   less than 70 : goal is less than 4%   Over 180 : goal is less than 20 %   Over 250: goal is  less than 5%          Hypoglycemia:    Watch for low blood sugars: (less than 70)  Symptoms of low blood sugar:   Shakiness or dizziness  Cold, clammy skin or sweating  Feeling hungry  Headache  Nervousness  A hard, fast heartbeat  Weakness  Confusion or irritability  Blurred eyesight  Having nightmares or waking up confused or sweating  Numbness or tingling in the lips or tongue     Treatment of Low Blood sugar Action Plan  1. Check blood glucose to be sure that it is low. You can’t always go by symptoms. If in doubt, treat your low blood glucose anyway.  2. Take 15 grams of carbohydrate (carb). Here are some choices:  4 oz. regular fruit juice  3-4 glucose tablets  6 oz. regular soda   7-8 jelly beans  3. Recheck blood glucose after 10-15 minutes. If blood glucose is still low (less than 70 mg/dl) repeat the treatment (step 2).  4. If your next meal is more than one hour away, eat a small snack.  5. If you’re not sure what caused your low blood glucose, call us .  6. Always  check your blood glucose before you drive         To treat a low, I recommend you carry with you easy, pre-portioned treatment for low blood sugars that are 15G of carbs:   - Children sized squeeze pouch applesauce (high fiber + carbs help prevent too high of a spike)  - Small children's sized juicebox- 15g carb --> 4oz juice box  - Glucose tablets from [employer], you can find them near diabetes supplies --> Note, you will need to eat 3-4 tablets to get to 15g of carbs  - Children sized fruit snack pack- look for one with 15 grams of total carbohydrate    AVOID complex carbs, or foods that contain fats along with carbs (like chocolate) can slow the absorption of glucose and should NOT be used to treat an emergency low        Diabetes health monitoring      1. LABS: It is important to monitor your kidney function (blood and urine protein levels) , liver function tests and cholesterol levels when you have diabetes. If your primary care provider has not ordered these tests, I will order them for you.   2. FOOT exams:  daily foot inspections for foot wounds or skin changes are important for foot care. Any unusual changes should be reported immediately.  3. EYE exams: Checking your eyes for diabetes changes is important and you should have a dilated eye exam done by an eye doctor EVERY year since these changes occur in the BACK of the eye and not visible by you.     Office protocols:   My Chart Messages - Our goal is to respond to all My Chart messages within 2 business days of receipt. Messages received after 4 pm on Friday, will be addressed on Monday, except if holiday.   Please DO NOT send urgent messages through My Chart as these messages are not monitored after hours.       Patient Calls -We make every attempt to answer all patient calls within 1 business day. Calls that come in after 4pm daily will be addressed the following business day. Nurses are available to answer your calls Monday - Friday from 8am - 4  pm except for holidays.      Refill policies:     Refills may be delayed if you have not had a recent office visit or recent labs as requested by your prescriber.       Our goal is to process your refill within 3  business day of receiving the refill request.   Contact your pharmacy at least 5 days prior to running out of medication and have them send an electronic request or submit a refill through My chart: select  “request refill” option in your [email or web address] account.  Refills are not addressed after hours or on weekends; covering providers  do not authorize routine medications on weekends.  Refills  received after 4pm on Fridays will be addressed on Mondays.    If  your prescription is due for a refill, and you are due for a follow up appointment., this will  may impact the ability for you to get a 90 supply if requested.   Yearly blood tests are  required for many medications to insure safe prescribing. If you are due for labs, you will have 30 days to complete the  requested labs before future refills are authorized.   In the event that your preferred pharmacy does not have the requested medication in stock (e.g. Backordered), it is your responsibility to find another pharmacy that has the requested medication available.  We will gladly send a new prescription to that pharmacy at your request.  To best provide you care, patients receiving routine medications need to be seen at least twice per year. However if the A1C is above 8% you will be need to be seen more frequently as recommended by provider and this will also impact your ability get obtain refills          Thank you   Elisa Mata   [phone]

## 2025-07-09 NOTE — TELEPHONE ENCOUNTER
Last office visit: 6/13/2025   Protocol: pass  Requested medication(s) are due for refill today: yes  Requested medication(s) are on the active medication list same strength, form, dose/ sig: yes  Requested medication(s) are managed by provider: yes  Patient has already received a [facility] refill: no    NOV: 10/3/2025  Last Labs: 6/10/2025  Asked to Return: none

## 2025-07-15 ENCOUNTER — OFFICE VISIT (OUTPATIENT)
Dept: SURGERY | Facility: CLINIC | Age: 70
End: 2025-07-15
Payer: MEDICARE

## 2025-07-15 DIAGNOSIS — N13.8 BPH WITH OBSTRUCTION/LOWER URINARY TRACT SYMPTOMS: Primary | ICD-10-CM

## 2025-07-15 DIAGNOSIS — N39.0 RECURRENT UTI: ICD-10-CM

## 2025-07-15 DIAGNOSIS — N31.9 HYPOTONIC NEUROGENIC BLADDER: ICD-10-CM

## 2025-07-15 DIAGNOSIS — N40.1 BPH WITH OBSTRUCTION/LOWER URINARY TRACT SYMPTOMS: Primary | ICD-10-CM

## 2025-07-15 DIAGNOSIS — R33.9 URINARY RETENTION: ICD-10-CM

## 2025-07-15 DIAGNOSIS — R97.20 ELEVATED PSA: ICD-10-CM

## 2025-07-15 DIAGNOSIS — N13.30 BILATERAL HYDRONEPHROSIS: ICD-10-CM

## 2025-07-15 LAB
APPEARANCE: CLEAR
BILIRUBIN: NEGATIVE
GLUCOSE (URINE DIPSTICK): 500 MG/DL
KETONES (URINE DIPSTICK): NEGATIVE MG/DL
LEUKOCYTES: NEGATIVE
MULTISTIX LOT#: ABNORMAL NUMERIC
NITRITE, URINE: NEGATIVE
OCCULT BLOOD: NEGATIVE
PH, URINE: 5.5 (ref 4.5–8)
PROTEIN (URINE DIPSTICK): NEGATIVE MG/DL
SPECIFIC GRAVITY: 1 (ref 1–1.03)
URINE-COLOR: YELLOW
UROBILINOGEN,SEMI-QN: 0.2 MG/DL (ref 0–1.9)

## 2025-07-15 PROCEDURE — G2211 COMPLEX E/M VISIT ADD ON: HCPCS | Performed by: UROLOGY

## 2025-07-15 PROCEDURE — 81003 URINALYSIS AUTO W/O SCOPE: CPT | Performed by: UROLOGY

## 2025-07-15 PROCEDURE — 99214 OFFICE O/P EST MOD 30 MIN: CPT | Performed by: UROLOGY

## 2025-07-15 RX ORDER — TAMSULOSIN HYDROCHLORIDE 0.4 MG/1
0.4 CAPSULE ORAL DAILY
Qty: 90 CAPSULE | Refills: 5 | Status: SHIPPED | OUTPATIENT
Start: 2025-07-15

## 2025-07-15 RX ORDER — FINASTERIDE 5 MG/1
5 TABLET, FILM COATED ORAL DAILY
Qty: 90 TABLET | Refills: 6 | Status: SHIPPED | OUTPATIENT
Start: 2025-07-15

## 2025-07-18 ENCOUNTER — TELEPHONE (OUTPATIENT)
Facility: CLINIC | Age: 70
End: 2025-07-18

## 2025-07-18 NOTE — TELEPHONE ENCOUNTER
Received fax from [employer] - states patient unresponsive related to G7 . Patient has been getting orders though [employer] since 2023 to current. My Chart Message sent to patient to confirm he did not request orders from [employer].

## 2025-07-21 NOTE — TELEPHONE ENCOUNTER
Patient noted he has also been getting calls/texts from [employer] related to order from [facility] - do not see anything in Epic about orders to [place]- patient has been using [employer].     Patient offered to send information from voice and text messages.

## 2025-07-22 NOTE — TELEPHONE ENCOUNTER
Contacted [employer] for info regarding patient's sensors.  Order was pushed from [employer] on 07/09/2025.  Contacted Alana at [facility], she confirmed that [employer] did NOT forward the order to [facility].  Notified [employer] to cancel order through [facility] (patient was never in contact with them to confirm order).

## 2025-07-22 NOTE — TELEPHONE ENCOUNTER
Should we reach out to [employer] [facility] about this message since patient states they have not registed with DME company before - patient did place \"stop\" in message to stop receiving messages from them.

## 2025-07-29 ENCOUNTER — TELEPHONE (OUTPATIENT)
Dept: FAMILY MEDICINE CLINIC | Facility: CLINIC | Age: 70
End: 2025-07-29

## 2025-07-31 ENCOUNTER — PATIENT MESSAGE (OUTPATIENT)
Dept: ENDOCRINOLOGY CLINIC | Facility: CLINIC | Age: 70
End: 2025-07-31

## 2025-07-31 ENCOUNTER — PATIENT MESSAGE (OUTPATIENT)
Dept: FAMILY MEDICINE CLINIC | Facility: CLINIC | Age: 70
End: 2025-07-31

## 2025-08-18 DIAGNOSIS — E78.5 DYSLIPIDEMIA: ICD-10-CM

## 2025-08-18 RX ORDER — EZETIMIBE AND SIMVASTATIN 10; 40 MG/1; MG/1
1 TABLET ORAL EVERY OTHER DAY
Qty: 45 TABLET | Refills: 1 | Status: SHIPPED | OUTPATIENT
Start: 2025-08-18

## (undated) DIAGNOSIS — Z79.4 TYPE 2 DIABETES MELLITUS WITH DIABETIC NEUROPATHY, WITH LONG-TERM CURRENT USE OF INSULIN (HCC): ICD-10-CM

## (undated) DIAGNOSIS — E11.69 DIABETES MELLITUS TYPE 2 IN OBESE (HCC): ICD-10-CM

## (undated) DIAGNOSIS — E66.9 DIABETES MELLITUS TYPE 2 IN OBESE (HCC): ICD-10-CM

## (undated) DIAGNOSIS — N13.8 BPH WITH OBSTRUCTION/LOWER URINARY TRACT SYMPTOMS: ICD-10-CM

## (undated) DIAGNOSIS — E11.40 TYPE 2 DIABETES MELLITUS WITH DIABETIC NEUROPATHY, WITH LONG-TERM CURRENT USE OF INSULIN (HCC): ICD-10-CM

## (undated) DIAGNOSIS — E78.5 DYSLIPIDEMIA: Primary | ICD-10-CM

## (undated) DIAGNOSIS — N40.1 BPH WITH OBSTRUCTION/LOWER URINARY TRACT SYMPTOMS: ICD-10-CM

## (undated) DEVICE — SOL H2O 1000ML BTL

## (undated) DEVICE — REM POLYHESIVE ADULT PATIENT RETURN ELECTRODE: Brand: VALLEYLAB

## (undated) DEVICE — HF-RESECTION ELECTRODE PLASMA-OVALBUTTON BUTTON, OVAL, 24 FR., 12°-30°, ESG TURIS: Brand: OLYMPUS

## (undated) DEVICE — SYRINGE,TOOMEY,IRRIGATION,70CC,STERILE: Brand: MEDLINE

## (undated) DEVICE — SYRINGE 30ML LL TIP

## (undated) DEVICE — HF-RESECTION ELECTRODE PLASMALOOP LOOP, LARGE, 24 FR., 12°/16°, ESG TURIS: Brand: OLYMPUS

## (undated) DEVICE — GAUZE SPONGES,12 PLY: Brand: CURITY

## (undated) DEVICE — URINE DRAINAGE BAG,BAG, NEEDLE SAMPLING, DRAIN TUBE: Brand: DOVER

## (undated) DEVICE — ENDOSCOPY PACK UPPER: Brand: MEDLINE INDUSTRIES, INC.

## (undated) DEVICE — Device

## (undated) DEVICE — CATH SECURING DEVICE STATLOCK

## (undated) DEVICE — SCD SLEEVE KNEE HI BLEND

## (undated) DEVICE — Device: Brand: DEFENDO AIR/WATER/SUCTION AND BIOPSY VALVE

## (undated) DEVICE — SOL  .9 3000ML

## (undated) DEVICE — 3M™ RED DOT™ MONITORING ELECTRODE WITH FOAM TAPE AND STICKY GEL, 50/BAG, 20/CASE, 72/PLT 2570: Brand: RED DOT™

## (undated) DEVICE — 1200CC GUARDIAN II: Brand: GUARDIAN

## (undated) DEVICE — CYSTO CDS-LF: Brand: MEDLINE INDUSTRIES, INC.

## (undated) DEVICE — FILTERLINE NASAL ADULT O2/CO2

## (undated) DEVICE — STERILE SYNTHETIC POLYISOPRENE POWDER-FREE SURGICAL GLOVES WITH HYDROGEL COATING: Brand: PROTEXIS

## (undated) NOTE — LETTER
12/15/18    Patient: Nba Duggan  : 5/10/1955 Visit date: 12/10/2018    Dear  Dr. Pradeep Roth, DO,    Thank you for referring Nba Duggan to my practice. Please find my assessment and plan below.                Assessment   Anal fissure  (prim

## (undated) NOTE — MR AVS SNAPSHOT
[facility], [address] [address] [id number]               Thank you for choosing us for your health care visit with Lili Ventura DO.   We are glad to serve you and happy to provide you with this summary Instructions and Information about Your Health     None      Allergies as of Apr 18, 2017     Dander     dog    Penicillins Diarrhea    Tetanus Toxoids                 Today's Vital Signs     BP Pulse Temp Height Weight BMI    138/78 mmHg 80 96.8 °F (36 °C USE 2 INHALATIONS FROM ONE CAPSULE DAILY VIA HANDIHALER           tamsulosin HCl 0.4 MG Caps   TAKE 1 BY MOUTH DAILY   Commonly known as:  FLOMAX           Valsartan-Hydrochlorothiazide 160-25 MG Tabs   TAKE 1 BY MOUTH DAILY           VYTORIN 10-40 MG Tabs

## (undated) NOTE — Clinical Note
BP was 98-50 today ; discussed decreasing hydrochlorothiazide to 12.5 mg daily since starting Farxiga 10mg once daily  He wanted to wait until your next appt

## (undated) NOTE — LETTER
[facility] Department  Phone: [phone]  Right Fax: [phone]    ADDRESSEE INFORMATION: SENDER INFORMATION:   To:   Dr. Lacie Recio From: Damaris Lentz RN     Department: Pre-Admission Testing   Fax Number: [phone] Date:

## (undated) NOTE — LETTER
19    Patient: Bandar Gooden  : 5/10/1955 Visit date: 2019    Dear  Dr. Carline Shaikh, DO,    Thank you for referring Bandar Gooden to my practice. Please find my assessment and plan below.          Assessment   Prolapsed internal hemorrh

## (undated) NOTE — LETTER
[facility]  Phone: [phone]  Right Fax: [phone]  OSR/CLIFF Notification    ADDRESSEE INFORMATION: SENDER INFORMATION:   To:   Dr. Chelly Schilling From: Raul Catherine RN   Phone #  Department: [facility]   Fax # [phone] Rev 2/12/14

## (undated) NOTE — LETTER
21    Patient: Simeon Meadows  : 5/10/1955 Visit date: 2021    Dear  Miki Handy DO    Thank you for referring Simeon Meadows to my practice. Please find my assessment and plan below.     Assessment   Cholecystoduodenal fistula  (evan into the gallbladder. Although air may be transiting, it does not appear that barium or oral contrast is transiting. Clinical exam today reveals his abdomen to be soft, nondistended, nontender, good bowel sounds. No guarding or rebound.   Negative Murp

## (undated) NOTE — LETTER
18    Patient: Ray Bailey  : 5/10/1955 Visit date: 2018    Dear  Dr. Margarita Zhang, DO,    Thank you for referring Ray Bailey to my practice. Please find my assessment and plan below.            Assessment   Anal fissure  (primary e present. External examination of the a large external right posterior lateral hemorrhoid. Digital rectal exam elicits significant tenderness.  The patient is most tender in the left posterior lateral position and there is evidence of a healing anal fissur

## (undated) NOTE — LETTER
07/01/21        [facility]  [address] [place] [address]      Dear Amee Zelyaa,    [facility] records indicate that you have outstanding lab work and or testing that was ordered for you and has not yet been completed:  Orders Placed This Encounter

## (undated) NOTE — LETTER
Patient Name: [place] Abraham  : 5/10/1955  MRN: [id number]  Patient Address:  [place] [address]      Coronavirus Disease 2019 (COVID-19)     [facility] is committed to the safety and well-being of our patients, member carefully. If your symptoms get worse, call your healthcare provider immediately. 3. Get rest and stay hydrated.    4. If you have a medical appointment, call the healthcare provider ahead of time and tell them that you have or may have COVID-19.  5. For m of fever-reducing medications; and  · Improvement in respiratory symptoms (e.g., cough, shortness of breath); and  · At least 10 days have passed since symptoms first appeared OR if asymptomatic patient or date of symptom onset is unclear then use 10 days donors must:    · Have had a confirmed diagnosis of COVID-19  · Be symptom-free for at least 14 days*    *Some people will be required to have a repeat COVID-19 test in order to be eligible to donate.  If you’re instructed by [facility] that a repeat test is r prevent PASC?   The best way to prevent the long-term symptoms of COVID-19 is by getting the COVID 19 vaccine as soon as it is available to you, wear a mask in public, avoid large gatherings, wash your hands frequently, and stay at least 6 feet away from ot

## (undated) NOTE — LETTER
19    Patient: Jermaine Mendoza  : 5/10/1955 Visit date: 2019    Dear  Dr. Lenn Buerger, DO,    Thank you for referring Jermaine Mendoza to my practice. Please find my assessment and plan below.          Assessment   Prolapsed internal hemorrh

## (undated) NOTE — LETTER
[facility] Testing Department  Phone: [phone]  Right Fax: [phone]  OSR/CLIFF Notification    ADDRESSEE INFORMATION: SENDER INFORMATION:   To:   Dr. David Mcgee From: Esthela Moralez RN   Phone # [phone] Department: Pre-Admission Rev 2/12/14

## (undated) NOTE — LETTER
21    Patient: Jermaine Mendoza  : 5/10/1955 Visit date: 2021    Dear  Mundo Sparrow, DO    Thank you for referring Jermaine Mendoza to my practice. Please find my assessment and plan below.        Assessment   Abnormal findings on diagnos radiologist read a potential fistula between the gallbladder and duodenum. I did not see evidence of this, but their interpretation is speculative based on the findings of air in the gallbladder and none in the hepatobiliary tree.   There is no pericholecy patient is anxious to get rid of the Nails catheter. He does not want to skip his scheduled appointment on Merlyn 3, 2021 for the TURP.     This patient has probably been walking around with the emphysematous gallbladder, as well as his gallstones, for many

## (undated) NOTE — LETTER
19    Patient: Иван Arciniega  : 5/10/1955 Visit date: 3/28/2019    Dear  Dr. Chidi Wild, DO,    Thank you for referring Иван Arciniega to my practice. Please find my assessment and plan below.          Assessment   Prolapsed internal hemorrh Physical exam: External examination of the anus reveals uncomplicated external hemorrhoids. No anal fissure or fistulae are present. Digital rectal exam is performed revealing a normal prostate which is symmetric and without nodularity.   Anoscopy is perf

## (undated) NOTE — LETTER
21    Patient: Sandra Stewart  : 5/10/1955 Visit date: 2021    Dear  Chela Laureano,     Thank you for referring Sandra Stewart to my practice. Please find my assessment and plan below.     Assessment   Cholecystoduodenal fistula  (pr further work-up for this cholecystoduodenal fistula. I do not recommend any further therapy at this time. We will just continue to have the patient monitor for any changes in his symptoms.     I discussed with the patient that if he ever develops any symp